# Patient Record
Sex: FEMALE
[De-identification: names, ages, dates, MRNs, and addresses within clinical notes are randomized per-mention and may not be internally consistent; named-entity substitution may affect disease eponyms.]

---

## 2017-02-27 VITALS — HEART RATE: 83 BPM

## 2017-04-28 ENCOUNTER — HOSPITAL ENCOUNTER (OUTPATIENT)
Dept: HOSPITAL 14 - H.OPSURG | Age: 62
Discharge: HOME | End: 2017-04-28
Attending: INTERNAL MEDICINE
Payer: MEDICAID

## 2017-04-28 VITALS — BODY MASS INDEX: 36 KG/M2

## 2017-04-28 VITALS — RESPIRATION RATE: 18 BRPM

## 2017-04-28 VITALS — DIASTOLIC BLOOD PRESSURE: 85 MMHG | SYSTOLIC BLOOD PRESSURE: 117 MMHG | HEART RATE: 92 BPM | TEMPERATURE: 98.3 F

## 2017-04-28 VITALS — OXYGEN SATURATION: 98 %

## 2017-04-28 DIAGNOSIS — R22.1: Primary | ICD-10-CM

## 2017-04-28 DIAGNOSIS — I48.91: ICD-10-CM

## 2017-04-28 DIAGNOSIS — C90.00: ICD-10-CM

## 2017-04-28 DIAGNOSIS — M54.9: ICD-10-CM

## 2017-04-28 LAB — APTT BLD: 32.5 SECONDS (ref 23.3–32.5)

## 2017-04-28 NOTE — PCM.SURG1
Surgeon's Initial Post Op Note





- Surgeon's Notes


Surgeon: Sofia


Assistant: None


Type of Anesthesia: IV Sedation, Local


Pre-Operative Diagnosis: Left neck mass


Operative Findings: Left neck mass


Post-Operative Diagnosis: Left neck mass


Operation Performed: Left neck mass biopsy


Specimen/Specimens Removed: 18G core samples x 2


Estimated Blood Loss: EBL {In ML}: 1


Date of Surgery/Procedure: 04/28/17


Time of Surgery/Procedure: 10:20

## 2017-04-28 NOTE — CP.SDSHP
Same Day Surgery [facility] P





- History


Proposed Procedure: Left neck mass biopsy


Pre-Op Diagnosis: Left neck mass, MM





- Allergies


Allergies: 


Allergies





No Known Allergies Allergy (Verified 04/28/17 08:09)


 











- Physical Exam


Vital Signs: 


 Vital Signs











  04/28/17 04/28/17 04/28/17





  08:52 10:22 10:35


 


Temperature 9.2 F L 98.5 F 97.0 F L


 


Pulse Rate 83 84 83


 


Respiratory 18 16 18





Rate   


 


Blood Pressure 119/81 141/98 H 140/95 H


 


O2 Sat by Pulse 96 100 98





Oximetry   














  04/28/17 04/28/17 04/28/17





  10:50 11:05 11:20


 


Temperature 97.6 F  


 


Pulse Rate 86 84 86


 


Respiratory 18 18 18





Rate   


 


Blood Pressure 130/80 137/80 130/90


 


O2 Sat by Pulse 98 99 99





Oximetry   














  04/28/17





  11:35


 


Temperature 


 


Pulse Rate 88


 


Respiratory 18





Rate 


 


Blood Pressure 134/86


 


O2 Sat by Pulse 99





Oximetry 














Short Stay Discharge





- Short Stay Discharge


Admitting Diagnosis/Reason for Visit: MULTIPLE MYELOMA


Disposition: HOME/ ROUTINE


Referrals: 


Tong Pickering MD [Primary Care Provider] -

## 2017-05-02 NOTE — CT
Left neck mass 



History: 61-year-old female with history of multiple myeloma 

presenting with left neck supraclavicular mass. 



Comparison: Comparison is made to prior CT scan of the chest from 

03/04/2016. 



Procedure and findings: 



Informed consent was obtained after discussion of relative risks and 

benefits with the patient.  The patient provided written informed 

consent. 



The left neck region was prepped and draped in the usual sterile 

techniques.  Direct ultrasound evaluation of the left neck was 

obtained. A heterogeneous relatively oval-shaped mass measuring 

approximately 1.8 centimeters is noted. No significant internal 

vascularity was identified.  Permanent images were stored. 



A percutaneous access site was chosen. 1 % lidocaine was used to 

anesthetize the skin and the subcutaneous soft tissue. A 17 gauge 

coaxial needle was introduced into the left neck mass.  The inner 

stylet was removed. 18 gauge spring-loaded biopsy system was 

introduced into the peripheral aspect of the mass. Two 18 gauge core 

biopsy samples were obtained. Biopsy samples were sent to pathology 

for analysis. 



Post biopsy ultrasound images did not demonstrate any hematoma. 



The patient tolerated the procedure well without any adverse events. 



Impression: 



Successful left neck mass biopsy.

## 2017-05-02 NOTE — US
Left neck mass 



History: 61-year-old female with history of multiple myeloma 

presenting with left neck supraclavicular mass. 



Comparison: Comparison is made to prior CT scan of the chest from 

03/04/2016. 



Procedure and findings: 



Informed consent was obtained after discussion of relative risks and 

benefits with the patient.  The patient provided written informed 

consent. 



The left neck region was prepped and draped in the usual sterile 

techniques.  Direct ultrasound evaluation of the left neck was 

obtained. A heterogeneous relatively oval-shaped mass measuring 

approximately 1.8 centimeters is noted. No significant internal 

vascularity was identified.  Permanent images were stored. 



A percutaneous access site was chosen. 1 % lidocaine was used to 

anesthetize the skin and the subcutaneous soft tissue. A 17 gauge 

coaxial needle was introduced into the left neck mass.  The inner 

stylet was removed. 18 gauge spring-loaded biopsy system was 

introduced into the peripheral aspect of the mass. Two 18 gauge core 

biopsy samples were obtained. Permanent images were stored. Biopsy 

samples were sent to pathology for analysis. 



Post biopsy ultrasound images did not demonstrate any hematoma. 



The patient tolerated the procedure well without any adverse events. 



Impression: 



Successful left neck mass biopsy.

## 2018-01-13 ENCOUNTER — HOSPITAL ENCOUNTER (INPATIENT)
Dept: HOSPITAL 14 - H.ER | Age: 63
LOS: 1 days | Discharge: HOME | DRG: 588 | End: 2018-01-14
Attending: INTERNAL MEDICINE | Admitting: INTERNAL MEDICINE
Payer: MEDICAID

## 2018-01-13 VITALS — BODY MASS INDEX: 39.4 KG/M2

## 2018-01-13 VITALS — RESPIRATION RATE: 18 BRPM

## 2018-01-13 DIAGNOSIS — Z79.01: ICD-10-CM

## 2018-01-13 DIAGNOSIS — C90.00: ICD-10-CM

## 2018-01-13 DIAGNOSIS — D89.9: ICD-10-CM

## 2018-01-13 DIAGNOSIS — I50.30: ICD-10-CM

## 2018-01-13 DIAGNOSIS — M10.9: ICD-10-CM

## 2018-01-13 DIAGNOSIS — Z83.3: ICD-10-CM

## 2018-01-13 DIAGNOSIS — Z82.5: ICD-10-CM

## 2018-01-13 DIAGNOSIS — Z82.49: ICD-10-CM

## 2018-01-13 DIAGNOSIS — I48.91: ICD-10-CM

## 2018-01-13 DIAGNOSIS — Z79.82: ICD-10-CM

## 2018-01-13 DIAGNOSIS — M54.9: ICD-10-CM

## 2018-01-13 DIAGNOSIS — Z79.899: ICD-10-CM

## 2018-01-13 DIAGNOSIS — I11.0: ICD-10-CM

## 2018-01-13 DIAGNOSIS — Z90.710: ICD-10-CM

## 2018-01-13 DIAGNOSIS — J20.9: Primary | ICD-10-CM

## 2018-01-13 DIAGNOSIS — Z92.21: ICD-10-CM

## 2018-01-13 DIAGNOSIS — G89.29: ICD-10-CM

## 2018-01-13 DIAGNOSIS — Z87.01: ICD-10-CM

## 2018-01-13 DIAGNOSIS — Z90.49: ICD-10-CM

## 2018-01-13 DIAGNOSIS — J44.0: ICD-10-CM

## 2018-01-13 DIAGNOSIS — L40.9: ICD-10-CM

## 2018-01-13 LAB
ALBUMIN SERPL-MCNC: 3.9 G/DL (ref 3.5–5)
ALBUMIN/GLOB SERPL: 1.3 {RATIO} (ref 1–2.1)
ALT SERPL-CCNC: 33 U/L (ref 9–52)
APTT BLD: 36.1 SECONDS (ref 25.6–37.1)
AST SERPL-CCNC: 28 U/L (ref 14–36)
BASOPHILS # BLD AUTO: 0 K/UL (ref 0–0.2)
BASOPHILS NFR BLD: 0.3 % (ref 0–2)
BILIRUB UR-MCNC: NEGATIVE MG/DL
BNP SERPL-MCNC: 663 PG/ML (ref 0–900)
BUN SERPL-MCNC: 10 MG/DL (ref 7–17)
CALCIUM SERPL-MCNC: 8.7 MG/DL (ref 8.4–10.2)
COLOR UR: YELLOW
EOSINOPHIL # BLD AUTO: 0.2 K/UL (ref 0–0.7)
EOSINOPHIL NFR BLD: 2.2 % (ref 0–4)
ERYTHROCYTE [DISTWIDTH] IN BLOOD BY AUTOMATED COUNT: 14.3 % (ref 11.5–14.5)
GFR NON-AFRICAN AMERICAN: > 60
GLUCOSE UR STRIP-MCNC: (no result) MG/DL
HGB BLD-MCNC: 15 G/DL (ref 12–16)
INR PPP: 1.5 (ref 0.9–1.2)
LEUKOCYTE ESTERASE UR-ACNC: (no result) LEU/UL
LYMPHOCYTES # BLD AUTO: 1 K/UL (ref 1–4.3)
LYMPHOCYTES NFR BLD AUTO: 11.6 % (ref 20–40)
MCH RBC QN AUTO: 30.3 PG (ref 27–31)
MCHC RBC AUTO-ENTMCNC: 33.8 G/DL (ref 33–37)
MCV RBC AUTO: 89.7 FL (ref 81–99)
MONOCYTES # BLD: 1.1 K/UL (ref 0–0.8)
MONOCYTES NFR BLD: 12.6 % (ref 0–10)
NEUTROPHILS # BLD: 6.1 K/UL (ref 1.8–7)
NEUTROPHILS NFR BLD AUTO: 73.3 % (ref 50–75)
NRBC BLD AUTO-RTO: 0 % (ref 0–0)
PH UR STRIP: 6 [PH] (ref 5–8)
PLATELET # BLD: 202 K/UL (ref 130–400)
PMV BLD AUTO: 9.2 FL (ref 7.2–11.7)
PROT UR STRIP-MCNC: NEGATIVE MG/DL
PROTHROMBIN TIME: 16.3 SECONDS (ref 9.8–13.1)
RBC # BLD AUTO: 4.95 MIL/UL (ref 3.8–5.2)
RBC # UR STRIP: NEGATIVE /UL
SP GR UR STRIP: 1.02 (ref 1–1.03)
SQUAMOUS EPITHIAL: < 1 /HPF (ref 0–5)
URINE CLARITY: CLEAR
URINE NITRATE: NEGATIVE
UROBILINOGEN UR-MCNC: (no result) MG/DL (ref 0.2–1)
WBC # BLD AUTO: 8.4 K/UL (ref 4.8–10.8)

## 2018-01-13 RX ADMIN — OXYCODONE HYDROCHLORIDE AND ACETAMINOPHEN PRN TAB: 5; 325 TABLET ORAL at 22:28

## 2018-01-13 NOTE — CP.PCM.HP
History of Present Illness





- History of Present Illness


History of Present Illness: 


CC: shortness of breath





HPI: 62 year old female with PMHx of A Fib, HTN, Multiple Myeloma on 

Chemotherapy, and CHF presents with a one day history of worsening dyspnea, 

associated with wheezing, ameliorated by home O2 patient has at home from 

another family member. In the ER she states she improved with nebulizer 

treatments, and that her mother had a history of emphysema. She has never smoked

, and was never around any family members who smoke. Patient is currently 

saturating 96% on 2 L nasal cannula. Chest XR has no active disease, patient is 

afebrile, and no leukocytosis. Patient to be observed overnight for possible 

worsening dyspnea. 





ROS: per HPI all other systems reviewed and negative 





PMH: Chronic Back Pain; Multiple Myeloma on Chemotherapy; A Fib on Digoxin and 

Xarelto as anticoagulant; CHF diastolic dysfunction with EF of 60% on ECHO of 2/ 2016; gout;    Mediastinal Mass; HTN; Fractured Ribs


PSH: Appendectomy; Hysterectomy; Cholecystectomy


SH: No Alcohol usage; No smoking; no illegal drug use; Live with the family


FH: Mother with DM II; HTN


Allergies: NKDA








Present on Admission





- Present on Admission


Any Indicators Present on Admission: No





Past Patient History





- Infectious Disease


Hx of Infectious Diseases: None





- Tetanus Immunizations


Tetanus Immunization: Unknown





- Past Medical History & Family History


Past Medical History?: Yes





- Past Social History


Smoking Status: Never Smoked





- CARDIAC


Hx Cardiac Disorders: Yes


Hx Cardia Arrhythmia: Yes


Hx Hypertension: Yes





- PULMONARY


Hx Respiratory Disorders: Yes


Hx Pneumonia: Yes


Other/Comment: MEDIASTINAL MASS





- NEUROLOGICAL


Hx Neurological Disorder: No





- HEENT


Hx HEENT Problems: No


Other/Comment: Glasses for reading





- RENAL


Hx Chronic Kidney Disease: No





- ENDOCRINE/METABOLIC


Hx Endocrine Disorders: No





- HEMATOLOGICAL/ONCOLOGICAL


Hx Blood Disorders: Yes


Hx AIDS: No


Hx Blood Transfusions: No


Hx Cancer: Yes (Multiple Myeloma)


Hx Chemotherapy: Yes (Multiple Myeloma on  revilimid)


Hx Human Immunodeficiency Virus (HIV): No


Other/Comment: Multiple Myeloma





- INTEGUMENTARY


Hx Dermatological Problems: No


Hx Psoriasis: Yes





- MUSCULOSKELETAL/RHEUMATOLOGICAL


Hx Musculoskeletal Disorders: Yes


Hx Back Pain: Yes


Hx Falls: No


Hx Fractures: Yes (rib)





- GASTROINTESTINAL


Hx Gastrointestinal Disorders: No





- GENITOURINARY/GYNECOLOGICAL


Hx Genitourinary Disorders: No


Other/Comment: Uterine fibroids Hx of Hysterectomy





- PSYCHIATRIC


Hx Psychophysiologic Disorder: No


Hx Substance Use: No





- SURGICAL HISTORY


Hx Surgeries: Yes


Hx Appendectomy: Yes


Hx Cholecystectomy: Yes


Hx Hysterectomy: Yes


Other/Comment: Hysterectomy





- ANESTHESIA


Hx Anesthesia: Yes


Hx Anesthesia Reactions: No


Hx Malignant Hyperthermia: No





Meds


Allergies/Adverse Reactions: 


 Allergies











Allergy/AdvReac Type Severity Reaction Status Date / Time


 


No Known Allergies Allergy   Verified 12/05/17 09:48














Physical Exam





- Constitutional


Appears: Non-toxic, No Acute Distress





- Head Exam


Head Exam: ATRAUMATIC, NORMOCEPHALIC





- Eye Exam


Eye Exam: EOMI, Normal appearance, PERRL


Pupil Exam: NORMAL ACCOMODATION





- ENT Exam


ENT Exam: Mucous Membranes Moist, Normal Oropharynx





- Respiratory Exam


Respiratory Exam: Wheezes, NORMAL BREATHING PATTERN





- Cardiovascular Exam


Cardiovascular Exam: RRR, +S1, +S2





- GI/Abdominal Exam


GI & Abdominal Exam: Normal Bowel Sounds, Soft.  absent: Organomegaly, 

Tenderness





- Extremities Exam


Extremities exam: Positive for: normal capillary refill, pedal pulses present





- Back Exam


Back exam: absent: CVA tenderness (L), CVA tenderness (R)





- Neurological Exam


Neurological exam: Alert, Oriented x3





- Psychiatric Exam


Psychiatric exam: Normal Affect, Normal Mood





Results





- Vital Signs


Recent Vital Signs: 





 Last Vital Signs











Temp  98.0 F   01/13/18 14:56


 


Pulse  74   01/13/18 14:56


 


Resp  19   01/13/18 15:05


 


BP  147/84   01/13/18 14:56


 


Pulse Ox  96   01/13/18 15:05














- Labs


Result Diagrams: 


 01/13/18 16:06





 01/13/18 16:06


Labs: 





 Laboratory Results - last 24 hr











  01/13/18 01/13/18 01/13/18





  16:03 16:06 16:06


 


WBC    8.4  D


 


RBC    4.95


 


Hgb    15.0


 


Hct    44.4


 


MCV    89.7


 


MCH    30.3


 


MCHC    33.8


 


RDW    14.3


 


Plt Count    202


 


MPV    9.2


 


Neut % (Auto)    73.3


 


Lymph % (Auto)    11.6 L


 


Mono % (Auto)    12.6 H


 


Eos % (Auto)    2.2


 


Baso % (Auto)    0.3


 


Neut #    6.1


 


Lymph #    1.0


 


Mono #    1.1 H


 


Eos #    0.2


 


Baso #    0.0


 


Sodium   140 


 


Potassium   3.4 L 


 


Chloride   101 


 


Carbon Dioxide   32 H 


 


Anion Gap   10 


 


BUN   10 


 


Creatinine   0.5 L 


 


Est GFR ( Amer)   > 60 


 


Est GFR (Non-Af Amer)   > 60 


 


Random Glucose   120 H 


 


Calcium   8.7 


 


Total Bilirubin   0.7 


 


AST   28 


 


ALT   33 


 


Alkaline Phosphatase   95 


 


Troponin I   < 0.0120 


 


NT-Pro-B Natriuret Pep   663 


 


Total Protein   6.9 


 


Albumin   3.9 


 


Globulin   3.0 


 


Albumin/Globulin Ratio   1.3 


 


Urine Color  Yellow  


 


Urine Clarity  Clear  


 


Urine pH  6.0  


 


Ur Specific Gravity  1.017  


 


Urine Protein  Negative  


 


Urine Glucose (UA)  Neg  


 


Urine Ketones  Negative  


 


Urine Blood  Negative  


 


Urine Nitrate  Negative  


 


Urine Bilirubin  Negative  


 


Urine Urobilinogen  0.2-1.0  


 


Ur Leukocyte Esterase  Neg  


 


Urine RBC (Auto)  3  


 


Urine Microscopic WBC  4  


 


Ur Squamous Epith Cells  < 1  














Assessment & Plan





- Assessment and Plan (Free Text)


Plan: 





62 year old female with PMHx of A Fib, HTN, Multiple Myeloma on Chemotherapy, 

and CHF presents with a one day history of worsening dyspnea, associated with 

mild cough and wheezing, ameliorated by home O2 patient has at home from 

another family member. In the ER she states she improved with nebulizer 

treatments, and that her mother had a history of emphysema. She has never smoked

, and was never around any family members who smoke. Patient is currently 

saturating 96% on 2 L nasal cannula. Chest XR has no active disease, patient is 

afebrile, and no leukocytosis. Patient to be observed overnight for possible 

worsening dyspnea. 








Dyspnea  2/2 Bronchitis?


   family hx emphysema


   afebrile, no leukocytosis, no cough or congestion on exam, + wheezing


   wheezing improving with bronchodilators


   procalcitonin pending for bacterial infection?


   cont bronchodilators and reassess in AM





AFib


HTN


   cont ASA, Coreg, Digoxin, Lisinopril, Xarelto





MM 


   on chemotherapy, follows with Dr. ASHER Otto


   cont dexamethasone





VTE ppx


   cont patient Xarelto

## 2018-01-13 NOTE — RAD
HISTORY:

SOB  



COMPARISON:

Chest radiograph dated 02/27/2017.



TECHNIQUE:

Chest PA and lateral



FINDINGS:



LUNGS:

No active pulmonary disease.



PLEURA:

No significant pleural effusion identified. No pneumothorax apparent.



CARDIOVASCULAR:

Cardiomediastinal silhouette stably enlarged.



OSSEOUS STRUCTURES:

Unchanged.



VISUALIZED UPPER ABDOMEN:

Normal.



OTHER FINDINGS:

None.



IMPRESSION:

No active disease.

## 2018-01-14 VITALS
DIASTOLIC BLOOD PRESSURE: 68 MMHG | OXYGEN SATURATION: 96 % | HEART RATE: 74 BPM | TEMPERATURE: 97.5 F | SYSTOLIC BLOOD PRESSURE: 96 MMHG

## 2018-01-14 VITALS — HEART RATE: 81 BPM

## 2018-01-14 PROCEDURE — 3E0F7GC INTRODUCTION OF OTHER THERAPEUTIC SUBSTANCE INTO RESPIRATORY TRACT, VIA NATURAL OR ARTIFICIAL OPENING: ICD-10-PCS | Performed by: INTERNAL MEDICINE

## 2018-01-14 RX ADMIN — OXYCODONE HYDROCHLORIDE AND ACETAMINOPHEN PRN TAB: 5; 325 TABLET ORAL at 13:15

## 2018-01-14 RX ADMIN — IPRATROPIUM BROMIDE AND ALBUTEROL SULFATE SCH ML: .5; 3 SOLUTION RESPIRATORY (INHALATION) at 11:26

## 2018-01-14 RX ADMIN — OXYCODONE HYDROCHLORIDE AND ACETAMINOPHEN PRN TAB: 5; 325 TABLET ORAL at 05:10

## 2018-01-14 RX ADMIN — IPRATROPIUM BROMIDE AND ALBUTEROL SULFATE SCH ML: .5; 3 SOLUTION RESPIRATORY (INHALATION) at 07:33

## 2018-01-14 RX ADMIN — IPRATROPIUM BROMIDE AND ALBUTEROL SULFATE SCH ML: .5; 3 SOLUTION RESPIRATORY (INHALATION) at 15:31

## 2018-01-14 NOTE — CT
PROCEDURE:  CT Chest without contrast



HISTORY:

r/o pneumonia



COMPARISON:

CT chest dated 02/19/2017.



TECHNIQUE:

Contiguous axial images were obtained through the chest without 

intravenous contrast enhancement. Sagittal and coronal 

reconstructions were performed.







Radiation dose (DLP): 648.1 mGy-cm. 



This CT exam was performed using one or more of the following dose 

reduction techniques: Automated exposure control, adjustment of the 

mA and/or kV according to patient size, and/or use of iterative 

reconstruction technique.



FINDINGS:



LUNGS:

Bibasilar atelectasis/scarring. Stable 5 millimeter left lower lobe 

nodule (series 3, image 61). Visualized airway clear. 



MEDIASTINUM:

Redemonstration of heterogeneous superior mediastinal/left lower neck 

mass measuring 6.7 by 4.9 centimeter causing similar tracheal 

deviation to the right without narrowing. Stable appearance of 

expansile lesion left posterior 1st rib contiguous with mediastinal 

mass. Unremarkable thoracic aorta. No aneurysm. Normal sized heart. 

Main pulmonary artery unremarkable. No vascular congestion. No 

lymphadenopathy. Small pericardial effusion.



PLEURA:

No pleural fluid. No pneumothorax.



BONES:

Stable osseous heterogeneity. Stable degenerative changes with 

multiple vertebral compression deformities. 



UPPER ABDOMEN:

Stable left hepatic lobe cyst.



OTHER FINDINGS:

None.



IMPRESSION:

Grossly stable appearance of left superior mediastinal/lower neck 

mass with associated involvement of the posterior 1st rib. 



Additional stable findings as above.

## 2018-01-14 NOTE — CP.PCM.DIS
Provider





- Provider


Date of Admission: 


01/13/18 17:41





Attending physician: 


Pat Jacques DO





Primary care physician: 





Jevon Pickering


Consults: 





none


Time Spent in preparation of Discharge (in minutes): 20





Hospital Course





- Lab Results


Lab Results: 


 Most Recent Lab Values











WBC  8.4 K/uL (4.8-10.8)  D 01/13/18  16:06    


 


RBC  4.95 Mil/uL (3.80-5.20)   01/13/18  16:06    


 


Hgb  15.0 g/dL (12.0-16.0)   01/13/18  16:06    


 


Hct  44.4 % (34.0-47.0)   01/13/18  16:06    


 


MCV  89.7 fl (81.0-99.0)   01/13/18  16:06    


 


MCH  30.3 pg (27.0-31.0)   01/13/18  16:06    


 


MCHC  33.8 g/dL (33.0-37.0)   01/13/18  16:06    


 


RDW  14.3 % (11.5-14.5)   01/13/18  16:06    


 


Plt Count  202 K/uL (130-400)   01/13/18  16:06    


 


MPV  9.2 fl (7.2-11.7)   01/13/18  16:06    


 


Neut % (Auto)  73.3 % (50.0-75.0)   01/13/18  16:06    


 


Lymph % (Auto)  11.6 % (20.0-40.0)  L  01/13/18  16:06    


 


Mono % (Auto)  12.6 % (0.0-10.0)  H  01/13/18  16:06    


 


Eos % (Auto)  2.2 % (0.0-4.0)   01/13/18  16:06    


 


Baso % (Auto)  0.3 % (0.0-2.0)   01/13/18  16:06    


 


Neut #  6.1 K/uL (1.8-7.0)   01/13/18  16:06    


 


Lymph #  1.0 K/uL (1.0-4.3)   01/13/18  16:06    


 


Mono #  1.1 K/uL (0.0-0.8)  H  01/13/18  16:06    


 


Eos #  0.2 K/uL (0.0-0.7)   01/13/18  16:06    


 


Baso #  0.0 K/uL (0.0-0.2)   01/13/18  16:06    


 


PT  16.3 Seconds (9.8-13.1)  H  01/13/18  20:01    


 


INR  1.5  (0.9-1.2)  H  01/13/18  20:01    


 


APTT  36.1 Seconds (25.6-37.1)   01/13/18  20:01    


 


Sodium  140 mmol/l (132-148)   01/13/18  16:06    


 


Potassium  3.4 MMOL/L (3.6-5.0)  L  01/13/18  16:06    


 


Chloride  101 mmol/L ()   01/13/18  16:06    


 


Carbon Dioxide  32 mmol/L (22-30)  H  01/13/18  16:06    


 


Anion Gap  10  (10-20)   01/13/18  16:06    


 


BUN  10 mg/dl (7-17)   01/13/18  16:06    


 


Creatinine  0.5 mg/dl (0.7-1.2)  L  01/13/18  16:06    


 


Est GFR ( Amer)  > 60   01/13/18  16:06    


 


Est GFR (Non-Af Amer)  > 60   01/13/18  16:06    


 


Random Glucose  120 mg/dL ()  H  01/13/18  16:06    


 


Calcium  8.7 mg/dL (8.4-10.2)   01/13/18  16:06    


 


Total Bilirubin  0.7 mg/dl (0.2-1.3)   01/13/18  16:06    


 


AST  28 U/L (14-36)   01/13/18  16:06    


 


ALT  33 U/L (9-52)   01/13/18  16:06    


 


Alkaline Phosphatase  95 U/L ()   01/13/18  16:06    


 


Troponin I  < 0.0120 ng/mL (0.00-0.120)   01/13/18  16:06    


 


NT-Pro-B Natriuret Pep  663 pg/ml (0-900)   01/13/18  16:06    


 


Total Protein  6.9 G/DL (6.3-8.2)   01/13/18  16:06    


 


Albumin  3.9 g/dL (3.5-5.0)   01/13/18  16:06    


 


Globulin  3.0 gm/dL (2.2-3.9)   01/13/18  16:06    


 


Albumin/Globulin Ratio  1.3  (1.0-2.1)   01/13/18  16:06    


 


Urine Color  Yellow  (YELLOW)   01/13/18  16:03    


 


Urine Clarity  Clear  (Clear)   01/13/18  16:03    


 


Urine pH  6.0  (5.0-8.0)   01/13/18  16:03    


 


Ur Specific Gravity  1.017  (1.003-1.030)   01/13/18  16:03    


 


Urine Protein  Negative mg/dL (NEGATIVE)   01/13/18  16:03    


 


Urine Glucose (UA)  Neg mg/dL (Normal)   01/13/18  16:03    


 


Urine Ketones  Negative mg/dL (NEGATIVE)   01/13/18  16:03    


 


Urine Blood  Negative  (NEGATIVE)   01/13/18  16:03    


 


Urine Nitrate  Negative  (NEGATIVE)   01/13/18  16:03    


 


Urine Bilirubin  Negative  (NEGATIVE)   01/13/18  16:03    


 


Urine Urobilinogen  0.2-1.0 mg/dL (0.2-1.0)   01/13/18  16:03    


 


Ur Leukocyte Esterase  Neg Ely/uL (Negative)   01/13/18  16:03    


 


Urine RBC (Auto)  3 /hpf (0-3)   01/13/18  16:03    


 


Urine Microscopic WBC  4 /hpf (0-5)   01/13/18  16:03    


 


Ur Squamous Epith Cells  < 1 /hpf (0-5)   01/13/18  16:03    














- Hospital Course


Hospital Course: 


62 year old female with PMHx of A Fib, HTN, Multiple Myeloma on Chemotherapy, 

and CHF presented with a one day history of worsening dyspnea, associated with 

mild cough and wheezing,upon going out in cold weather , ameliorated by home O2 

patient has at home from another family member. In the ER she states she 

improved with nebulizer treatments, and that her mother had a history of 

emphysema. She has never smoked, and was never around any family members who 

smoke.she states that was just started recently , 2 weeks ago on Zometa. She 

also admits to having cough and greenish sputum production for 5 days and was 

started on Mucinex  Po by her PMD. At present she denies any more sputum 

production .CXR showed no active disease but some increased interstitial 

markings ( as read by me). She was found to be afebrile with normal WBC, 

wheezing on exam . She was given Dounoebs in Er and Levaquine IV and placed 

under observation in telemetry for dyspnea and acute bronchitis.


Patient clinically showed improvement , at present with no dyspnea, scattered 

wheezing on exam, afebrile saturating 97-98 % on 2 L O2 via NC


patient feeling well and ready to go home


Will discharge patient home on Albuterol PRN,Levaquine and Medrol pack 


Patient to follow up with Dr. Pickering and her oncologist for continuation of her 

chemotherapy





1. Acute bronchitis/ URI 


Dyspnea  2/2 Bronchitis


Patient recently started on Zometa that can cause URi and dyspnea


afebrile, no leukocytosis, with cough and greenish sputum production for the 

last 4 days, + wheezing


wheezing improved with bronchodilators


Given solumedrol 125 mg IV, levaquine and Duonebs


Will continue same treatment upon discharge











2.AFib


rate controlled


continue digoxin and xeralto





3.HTN


controlled 


cont ASA, Coreg, Digoxin, Lisinopril, Xarelto





4.MM 


on chemotherapy, follows with Dr. ASHER Otto














Discharge Exam





- Head Exam


Head Exam: ATRAUMATIC, NORMOCEPHALIC





- Eye Exam


Eye Exam: EOMI, Normal appearance, PERRL


Pupil Exam: NORMAL ACCOMODATION





- ENT Exam


ENT Exam: Mucous Membranes Moist, Normal Exam





- Neck Exam


Neck exam: Full Rom, Normal Inspection





- Respiratory Exam


Respiratory Exam: Wheezes (scattered wheezing and rhonchi), NORMAL BREATHING 

PATTERN.  absent: Accessory Muscle Use, Prolonged Expiratory Phase, Respiratory 

Distress





- Cardiovascular Exam


Cardiovascular Exam: Irregular Rhythm, +S1, +S2.  absent: JVD





- GI/Abdominal Exam


GI & Abdominal Exam: Normal Bowel Sounds, Soft.  absent: Distended, Guarding, 

Rebound, Tenderness





- Rectal Exam


Rectal Exam: Deferred





- Extremities Exam


Extremities exam: normal capillary refill, normal inspection, pedal pulses 

present





- Back Exam


Back exam: NORMAL INSPECTION





- Neurological Exam


Neurological exam: Alert, CN II-XII Intact, Oriented x3, Reflexes Normal





- Psychiatric Exam


Psychiatric exam: Normal Affect, Normal Mood





- Skin


Skin Exam: Dry, Intact, Normal Color, Warm





Discharge Plan





- Discharge Medications


Prescriptions: 


Albuterol HFA [Ventolin HFA 90 mcg/actuation (8 g)] 1 puff IH Q4 #1 inhaler


levoFLOXacin [Levaquin] 750 mg PO DAILY #7 tab


Methylprednisolone [Medrol Dose Pack (21 tabs)] 4 mg PO DAILY #21 mg





- Follow Up Plan


Condition: STABLE


Disposition: HOME/ ROUTINE


Patient education suggested?: Yes


Instructions:  Bronchospasm (DC), How to Use a Nebulizer (DC), Acute Bronchitis 

(GEN)


Referrals: 


Tong Pickering MD [Staff Provider] -

## 2018-01-17 NOTE — CARD
--------------- APPROVED REPORT --------------





EKG Measurement

Heart [id number]

[id number]

[id number]

[id number]



<Conclusion>

Atrial fibrillation

ST & T wave abnormality, consider inferolateral ischemia

Abnormal ECG

## 2018-01-18 NOTE — ED PDOC
HPI: SOB/CHF/COPD


Time Seen by Provider: 01/13/18 15:05


Chief Complaint (Nursing): Shortness Of Breath


Chief Complaint (Provider): cough, SOB, respiratory distress


History Per: Patient, Family


History/Exam Limitations: no limitations


Current Symptoms Are (Timing): Still Present


Initiating Event: Upper Respiratory Illness


Quality: Tightness


Exacerbating Factor(s): Exertion, Coughing


Current Respiratory Medications: See Home Med List


Severity: Severe


Similar Symptoms Previously: +


Recently: Treated By A Physician


Additional Complaint(s): 





63yo female currently under care for multiple myeloma on cycle for chemo 

presents c/o cough, SOB, malaise and chest tightness. Denies hemoptysis, leg 

pain/edema or syncope.  Using medications at home without relief. 





Past Medical History


Reviewed: Historical Data, Nursing Documentation, Vital Signs


Vital Signs: 





 Last Vital Signs











Temp  97.5 F L  01/14/18 12:16


 


Pulse  74   01/14/18 12:16


 


Resp  18   01/14/18 12:16


 


BP  96/68 L  01/14/18 12:16


 


Pulse Ox  96   01/14/18 12:16














- Medical History


PMH: Atrial Fibrillation, Back Problems, Cardia Arrhythmia, CHF, Fractures (rib)

, HTN, Pneumonia


   Denies: HIV, Chronic Kidney Disease





- Surgical History


Surgical History: Appendectomy, Cholecystectomy





- Family History


Family History: States: Unknown Family Hx





- Living Arrangements


Living Arrangements: With Family





- Social History


Current smoker - smoking cessation education provided: No





- Home Medications


Home Medications: 


 Ambulatory Orders











 Medication  Instructions  Recorded


 


Aspirin [Aspirin EC] 81 mg PO DAILY #0 tablet. 06/20/16


 


Carvedilol [Coreg] 25 mg PO Q12 #0 tab 06/20/16


 


Digoxin 0.125 mg PO DAILY #0 tab 06/20/16


 


Famotidine [Pepcid] 20 mg PO DAILY #0 tab 06/20/16


 


Furosemide [Lasix] 40 mg PO DAILY #0 tab 06/20/16


 


Lisinopril [Zestril] 10 mg PO DAILY #0 tab 06/20/16


 


Calcium Carbonate [Oscal] 500 mg PO BID  tab 02/21/17


 


Rivaroxaban [Xarelto] 20 mg PO DAILY 02/28/17


 


Lenalidomide [Revlimid] 15 mg PO DAILY 05/23/17


 


Potassium Chloride [K-Dur 20 mEq 2 tab PO BID 08/15/17





ER Tab]  


 


Albuterol HFA [Ventolin HFA 90 1 puff IH Q4 #1 inhaler 01/14/18





mcg/actuation (8 g)]  


 


Guaifenesin [Mucinex] 600 mg PO BID #20 tab.er.12h 01/14/18


 


Methylprednisolone [Medrol Dose 4 mg PO DAILY #21 mg 01/14/18





Pack (21 tabs)]  


 


levoFLOXacin [Levaquin] 750 mg PO DAILY #7 tab 01/14/18














- Allergies


Allergies/Adverse Reactions: 


 Allergies











Allergy/AdvReac Type Severity Reaction Status Date / Time


 


No Known Allergies Allergy   Verified 12/05/17 09:48














Review of Systems


Constitutional: Positive for: Weakness, Malaise


ENT: Negative for: Mouth Swelling, Throat Swelling


Cardiovascular: Positive for: Chest Pain, Palpitations


Respiratory: Positive for: Cough, Shortness of Breath


Gastrointestinal: Negative for: Vomiting, Abdominal Pain


Musculoskeletal: Negative for: Neck Pain, Arm Pain, Leg Pain


Neurological: Positive for: Headache, Dizziness.  Negative for: Weakness, 

Numbness


Psych: Negative for: Suicidal ideation





Physical Exam





- Reviewed


Nursing Documentation Reviewed: Yes


Vital Signs Reviewed: Yes





- Physical Exam


Appears: Positive for: Non-toxic, Uncomfortable (mild resp distress)


Head Exam: Positive for: ATRAUMATIC, NORMAL INSPECTION, NORMOCEPHALIC


Skin: Positive for: Normal Color, Warm, DRY


Eye Exam: Positive for: EOMI, Normal appearance, PERRL


ENT: Positive for: Normal ENT Inspection


Neck: Positive for: Normal, Painless ROM


Cardiovascular/Chest: Positive for: Regular Rate, Rhythm


Respiratory: Positive for: Decreased Breath Sounds, Wheezing, Respiratory 

Distress


Pulses-Radial (L): 3+/4+


Pulses-Radial (R): 3+/4+


Gastrointestinal/Abdominal: Positive for: Bowel Sounds, Soft.  Negative for: 

Tenderness


Back: Positive for: Normal Inspection


Extremity: Positive for: Normal ROM


Neurologic/Psych: Positive for: Alert, Oriented.  Negative for: Motor/Sensory 

Deficits





- Laboratory Results


Result Diagrams: 


 01/13/18 16:06





 01/13/18 16:06





- ECG


O2 Sat by Pulse Oximetry: 96





Medical Decision Making


Medical Decision Making: 





workup for dyspnea initiated.  given history on chemo/myeloma consider 

infectious etiology as immunocompromised





labs, CXR and EKG were reviewed





Given bronchodilators and antibiotics were initiated empirically given clinical 

presentation and immunocompromised





Despite optimal treatment in ED, she remained w dyspnea, placed obs to 

hospitalist covering Dr Pickering














Disposition





- Clinical Impression


Clinical Impression: 


 Dyspnea, Respiratory tract infection








- Patient ED Disposition


Is Patient to be Admitted: Yes


Counseled Patient/Family Regarding: Studies Performed, Diagnosis





- Disposition


Disposition Time: 17:01


Condition: STABLE





- Pt Status Changed To:


Hospital Disposition Of: Observation

## 2018-04-02 ENCOUNTER — HOSPITAL ENCOUNTER (OUTPATIENT)
Dept: HOSPITAL 14 - H.ER | Age: 63
Setting detail: OBSERVATION
LOS: 2 days | Discharge: HOME | End: 2018-04-04
Attending: GENERAL ACUTE CARE HOSPITAL | Admitting: GENERAL ACUTE CARE HOSPITAL
Payer: MEDICAID

## 2018-04-02 VITALS — BODY MASS INDEX: 39.6 KG/M2

## 2018-04-02 DIAGNOSIS — I11.0: Primary | ICD-10-CM

## 2018-04-02 DIAGNOSIS — J98.11: ICD-10-CM

## 2018-04-02 DIAGNOSIS — I48.2: ICD-10-CM

## 2018-04-02 DIAGNOSIS — C90.00: ICD-10-CM

## 2018-04-02 DIAGNOSIS — Z90.710: ICD-10-CM

## 2018-04-02 DIAGNOSIS — Z90.49: ICD-10-CM

## 2018-04-02 DIAGNOSIS — J44.9: ICD-10-CM

## 2018-04-02 DIAGNOSIS — I50.33: ICD-10-CM

## 2018-04-02 LAB
ALBUMIN SERPL-MCNC: 3.7 G/DL (ref 3.5–5)
ALBUMIN/GLOB SERPL: 1.2 {RATIO} (ref 1–2.1)
ALT SERPL-CCNC: 64 U/L (ref 9–52)
APTT BLD: 44.7 SECONDS (ref 25.6–37.1)
AST SERPL-CCNC: 41 U/L (ref 14–36)
BASOPHILS # BLD AUTO: 0 K/UL (ref 0–0.2)
BASOPHILS NFR BLD: 0.7 % (ref 0–2)
BILIRUB UR-MCNC: NEGATIVE MG/DL
BNP SERPL-MCNC: 1120 PG/ML (ref 0–900)
BUN SERPL-MCNC: 4 MG/DL (ref 7–17)
CALCIUM SERPL-MCNC: 9 MG/DL (ref 8.4–10.2)
COLOR UR: (no result)
EOSINOPHIL # BLD AUTO: 0.1 K/UL (ref 0–0.7)
EOSINOPHIL NFR BLD: 1.6 % (ref 0–4)
ERYTHROCYTE [DISTWIDTH] IN BLOOD BY AUTOMATED COUNT: 16.4 % (ref 11.5–14.5)
GFR NON-AFRICAN AMERICAN: > 60
GLUCOSE UR STRIP-MCNC: (no result) MG/DL
HGB BLD-MCNC: 12.7 G/DL (ref 12–16)
INR PPP: 1.7 (ref 0.9–1.2)
LEUKOCYTE ESTERASE UR-ACNC: (no result) LEU/UL
LYMPHOCYTES # BLD AUTO: 0.8 K/UL (ref 1–4.3)
LYMPHOCYTES NFR BLD AUTO: 15.4 % (ref 20–40)
MCH RBC QN AUTO: 30.3 PG (ref 27–31)
MCHC RBC AUTO-ENTMCNC: 33.7 G/DL (ref 33–37)
MCV RBC AUTO: 89.7 FL (ref 81–99)
MONOCYTES # BLD: 0.6 K/UL (ref 0–0.8)
MONOCYTES NFR BLD: 12.6 % (ref 0–10)
NEUTROPHILS # BLD: 3.6 K/UL (ref 1.8–7)
NEUTROPHILS NFR BLD AUTO: 69.7 % (ref 50–75)
NRBC BLD AUTO-RTO: 0.3 % (ref 0–0)
PH UR STRIP: 7 [PH] (ref 5–8)
PLATELET # BLD: 263 K/UL (ref 130–400)
PMV BLD AUTO: 8.5 FL (ref 7.2–11.7)
PROT UR STRIP-MCNC: NEGATIVE MG/DL
PROTHROMBIN TIME: 18.9 SECONDS (ref 9.8–13.1)
RBC # BLD AUTO: 4.18 MIL/UL (ref 3.8–5.2)
RBC # UR STRIP: NEGATIVE /UL
SP GR UR STRIP: < 1.005 (ref 1–1.03)
URINE CLARITY: CLEAR
UROBILINOGEN UR-MCNC: (no result) MG/DL (ref 0.2–1)
WBC # BLD AUTO: 5.1 K/UL (ref 4.8–10.8)

## 2018-04-02 PROCEDURE — 71045 X-RAY EXAM CHEST 1 VIEW: CPT

## 2018-04-02 PROCEDURE — 93306 TTE W/DOPPLER COMPLETE: CPT

## 2018-04-02 PROCEDURE — 80048 BASIC METABOLIC PNL TOTAL CA: CPT

## 2018-04-02 PROCEDURE — 83880 ASSAY OF NATRIURETIC PEPTIDE: CPT

## 2018-04-02 PROCEDURE — 81003 URINALYSIS AUTO W/O SCOPE: CPT

## 2018-04-02 PROCEDURE — 85025 COMPLETE CBC W/AUTO DIFF WBC: CPT

## 2018-04-02 PROCEDURE — 80053 COMPREHEN METABOLIC PANEL: CPT

## 2018-04-02 PROCEDURE — 36415 COLL VENOUS BLD VENIPUNCTURE: CPT

## 2018-04-02 PROCEDURE — 93005 ELECTROCARDIOGRAM TRACING: CPT

## 2018-04-02 PROCEDURE — 99285 EMERGENCY DEPT VISIT HI MDM: CPT

## 2018-04-02 PROCEDURE — 85610 PROTHROMBIN TIME: CPT

## 2018-04-02 PROCEDURE — 85730 THROMBOPLASTIN TIME PARTIAL: CPT

## 2018-04-02 PROCEDURE — 96374 THER/PROPH/DIAG INJ IV PUSH: CPT

## 2018-04-02 PROCEDURE — 84484 ASSAY OF TROPONIN QUANT: CPT

## 2018-04-02 RX ADMIN — OXYCODONE HYDROCHLORIDE AND ACETAMINOPHEN PRN TAB: 5; 325 TABLET ORAL at 21:11

## 2018-04-02 RX ADMIN — POTASSIUM CHLORIDE SCH MEQ: 20 TABLET, EXTENDED RELEASE ORAL at 18:07

## 2018-04-02 NOTE — RAD
HISTORY:

SOB  



COMPARISON:

Chest radiographs 01/13/2018. 



FINDINGS:



LUNGS:

Inspiratory volume appears diminished bilaterally. Linear atelectasis 

or fibrosis seen at the inferior left lung zone laterally. No 

infiltrate bilaterally.



PLEURA:

No significant pleural effusion identified, no pneumothorax apparent.



CARDIOVASCULAR:

Prominent cardiac silhouette appears stable. Borderline pulmonary 

venous congestion.



OSSEOUS STRUCTURES:

Multiple old healed mid to inferior right rib fractures again evident.



VISUALIZED UPPER ABDOMEN:

Normal.



OTHER FINDINGS:

None.



IMPRESSION:

Borderline CHF pattern.  Linear atelectasis or fibrosis in the left 

base laterally. No acute infiltrate or pleural effusion identified 

bilaterally.

## 2018-04-02 NOTE — ED PDOC
HPI: SOB/CHF/COPD


Time Seen by Provider: 04/02/18 12:25


Chief Complaint (Nursing): Shortness Of Breath


Chief Complaint (Provider): difficulty breathing


History Per: Patient, 


History/Exam Limitations: no limitations


Onset/Duration Of Symptoms: Days (2)


Current Symptoms Are (Timing): Still Present


Quality: Tightness


Exacerbating Factor(s): Exertion, Laying Flat, Coughing


Current Respiratory Medications: See Home Med List


Severity: Moderate


Associated Symptoms: Heart Racing, Ankle/Leg Swelling, Dizziness


Similar Symptoms Previously: ++


Additional Complaint(s): 





63yo female c/o dyspnea, orthopnea and cough ongoing and worsening for several 

days. Denies fever, body aches or syncope. States compliance w medications, 

admits to chinese food intake which may have precipitated symptoms. 


Prior charts reviewed revealing under treatment for multiple myeloma, also w 

Afib, HTN, CHF and had episode URI/bronchoconstriction this winter requiring 

hospitalization.





Past Medical History


Reviewed: Historical Data, Nursing Documentation, Vital Signs


Vital Signs: 


 Last Vital Signs











Temp  98.6 F   04/04/18 11:48


 


Pulse  81   04/04/18 11:48


 


Resp  18   04/04/18 11:48


 


BP  121/77   04/04/18 11:48


 


Pulse Ox  96   04/04/18 11:48














- Medical History


PMH: Atrial Fibrillation, Back Problems, Cardia Arrhythmia, CHF, Fractures (rib)

, HTN, Pneumonia


   Denies: HIV, Chronic Kidney Disease





- Surgical History


Surgical History: Appendectomy, Cholecystectomy





- Family History


Family History: States: Unknown Family Hx





- Living Arrangements


Living Arrangements: With Family





- Social History


Current smoker - smoking cessation education provided: No


Drugs: Denies





- Home Medications


Home Medications: 


 Ambulatory Orders











 Medication  Instructions  Recorded


 


Rivaroxaban [Xarelto] 20 mg PO DAILY 02/28/17


 


Lenalidomide [Revlimid] 15 mg PO DAILY 05/23/17


 


Potassium Chloride [K-Dur 20 mEq 20 meq PO BID 08/15/17





ER Tab]  


 


Dexamethasone [Decadron] 10 mg PO BID 03/05/18


 


Albuterol HFA [Ventolin HFA 90 2 puff IH Q4 PRN 04/02/18





mcg/actuation (8 g)]  


 


Allopurinol [Zyloprim] 100 mg PO DAILY 04/02/18


 


Aspirin [Ecotrin] 81 mg PO DAILY 04/02/18


 


Carvedilol [Coreg] 25 mg PO Q12 04/02/18


 


Digoxin [Digitek] 125 mcg PO DAILY 04/02/18


 


Famotidine [Pepcid] 20 mg PO DAILY 04/02/18


 


Lisinopril [Zestril] 10 mg PO DAILY 04/02/18


 


oxyCODONE/Acetaminophen [Percocet 1 tab PO Q6 PRN 04/02/18





5/325 mg Tab]  


 


Furosemide [Lasix] 20 mg PO BID #60 04/04/18


 


guaiFENesin/Dextromethorphan 10 ml PO Q4 PRN #200 ml 04/04/18





[Robitussin DM]  














- Allergies


Allergies/Adverse Reactions: 


 Allergies











Allergy/AdvReac Type Severity Reaction Status Date / Time


 


No Known Allergies Allergy   Verified 12/05/17 09:48














Review of Systems


Constitutional: Positive for: Weakness.  Negative for: Fever, Chills


Cardiovascular: Positive for: Chest Pain, Palpitations, Paroxysmal Noc. Dyspnea

, Edema, Light Headedness


Respiratory: Positive for: Cough, Shortness of Breath, SOB with Exertion.  

Negative for: Hemoptysis


Gastrointestinal: Negative for: Nausea, Abdominal Pain


Genitourinary Female: Negative for: Dysuria


Musculoskeletal: Negative for: Neck Pain, Leg Pain


Skin: Negative for: Rash, Jaundice


Neurological: Positive for: Dizziness.  Negative for: Weakness, Numbness, 

Headache


Psych: Negative for: Depression





Physical Exam





- Reviewed


Nursing Documentation Reviewed: Yes


Vital Signs Reviewed: Yes





- Physical Exam


Appears: Positive for: Well, Non-toxic, No Acute Distress


Head Exam: Positive for: ATRAUMATIC, NORMAL INSPECTION, NORMOCEPHALIC


Skin: Positive for: Normal Color, Warm, DRY


Eye Exam: Positive for: EOMI, Normal appearance, PERRL


ENT: Positive for: Normal ENT Inspection


Neck: Positive for: Normal, Painless ROM


Cardiovascular/Chest: Positive for: Regular Rate, Rhythm


Respiratory: Positive for: Decreased Breath Sounds, Wheezing.  Negative for: 

Respiratory Distress


Gastrointestinal/Abdominal: Positive for: Soft.  Negative for: Tenderness


Back: Positive for: Normal Inspection


Extremity: Positive for: Pedal Edema, Swelling (b/l LE)


Neurologic/Psych: Positive for: Alert, Oriented.  Negative for: Motor/Sensory 

Deficits





- Laboratory Results


Result Diagrams: 


 04/02/18 13:35





 04/04/18 04:20





- ECG


ECG: Positive for: Interpreted By Me


ECG Rhythm: Positive for: Atrial Fibrillation, Nonspecific Changes


Rate: 79


O2 Sat by Pulse Oximetry: 99


Pulse Ox Interpretation: Normal





Medical Decision Making


Medical Decision Making: 





workup for dyspnea initiated


Duoneb ordered given trace wheeze at bases








Time: 13:06


Portable Chest X-Ray


FINDINGS:


LUNGS:


Inspiratory volume appears diminished bilaterally. Linear atelectasis or 

fibrosis seen at the inferior left lung zone laterally. No infiltrate 

bilaterally.





PLEURA:


No significant pleural effusion identified, no pneumothorax apparent.





CARDIOVASCULAR:


Prominent cardiac silhouette appears stable. Borderline pulmonary venous 

congestion.





OSSEOUS STRUCTURES:


Multiple old healed mid to inferior right rib fractures again evident.





VISUALIZED UPPER ABDOMEN:


Normal.





OTHER FINDINGS:


None.





IMPRESSION:


Borderline CHF pattern.  Linear atelectasis or fibrosis in the left base 

laterally. No acute infiltrate or pleural effusion identified bilaterally.











14:55


Discussed case with Dr. Lockett











--------------------------------------------------------------------------------

-----------------


Scribe Attestation:


Documented by Hermann Chan, acting as a scribe for Joey Carreon III DO








Disposition





- Clinical Impression


Clinical Impression: 


 CHF exacerbation








- Patient ED Disposition


Is Patient to be Admitted: Yes





- Disposition


Disposition Time: 14:30 (admit hospitalist)


Condition: GOOD





- Pt Status Changed To:


Hospital Disposition Of: Observation





- POA


Present On Arrival: None

## 2018-04-02 NOTE — CP.PCM.HP
History of Present Illness





- History of Present Illness


History of Present Illness: 





63 yo female with history of AFib, HTN, MMyeloma and CHF came in complaining of 

sudden onset of SOB upon waking up this morning. Accompanying symptoms were 

orthopnea with 4 pillows and on going cough productive with yellow sputum for a 

few days. Denied chest pain, fever, chills or abdominal pain. 

















Present on Admission





- Present on Admission


Any Indicators Present on Admission: No


History of DVT/PE: No


History of Uncontrolled Diabetes: No


Urinary Catheter: No


Decubitus Ulcer Present: No





Review of Systems





- Review of Systems


All systems: reviewed and no additional remarkable complaints except (aside 

from those mentioned above, 12 point system review were negative by me)





Past Patient History





- Infectious Disease


Hx of Infectious Diseases: None





- Tetanus Immunizations


Tetanus Immunization: Unknown





- Past Medical History & Family History


Past Medical History?: Yes





- Past Social History


Smoking Status: Never Smoked


Alcohol: Occasional


Drugs: Denies


Home Situation {Lives}: With Family





- CARDIAC


Hx Atrial Fibrillation: Yes


Hx Cardia Arrhythmia: Yes


Hx Congestive Heart Failure: Yes


Hx Hypertension: Yes





- PULMONARY


Hx Pneumonia: Yes





- NEUROLOGICAL


Hx Neurological Disorder: No





- HEENT


Hx HEENT Problems: No


Other/Comment: Glasses for reading





- RENAL


Hx Chronic Kidney Disease: No





- ENDOCRINE/METABOLIC


Hx Endocrine Disorders: No





- HEMATOLOGICAL/ONCOLOGICAL


Hx Chemotherapy: Yes


Hx Human Immunodeficiency Virus (HIV): No


Other/Comment: Multiple Myeloma





- INTEGUMENTARY


Hx Dermatological Problems: Yes


Hx Psoriasis: Yes





- MUSCULOSKELETAL/RHEUMATOLOGICAL


Hx Fractures: Yes (rib)


Hx Gout: Yes





- GASTROINTESTINAL


Hx Gastrointestinal Disorders: No





- GENITOURINARY/GYNECOLOGICAL


Hx Genitourinary Disorders: No


Other/Comment: Uterine Fibroids





- PSYCHIATRIC


Hx Psychophysiologic Disorder: No


Hx Substance Use: No





- SURGICAL HISTORY


Hx Appendectomy: Yes


Hx Cholecystectomy: Yes


Hx Hysterectomy: Yes (because of Fibroid Uteri)





- ANESTHESIA


Hx Anesthesia: Yes


Hx Anesthesia Reactions: No


Hx Malignant Hyperthermia: No





Meds


Allergies/Adverse Reactions: 


 Allergies











Allergy/AdvReac Type Severity Reaction Status Date / Time


 


No Known Allergies Allergy   Verified 12/05/17 09:48














Physical Exam





- Constitutional


Appears: No Acute Distress





- Head Exam


Head Exam: ATRAUMATIC





- Eye Exam


Eye Exam: absent: Scleral icterus





- ENT Exam


ENT Exam: Mucous Membranes Moist





- Neck Exam


Neck exam: Negative for: Meningismus





- Respiratory Exam


Respiratory Exam: Decreased Breath Sounds.  absent: Rales, Rhonchi, Wheezes, 

Respiratory Distress





- Cardiovascular Exam


Cardiovascular Exam: REGULAR RHYTHM, +S1, +S2





- GI/Abdominal Exam


GI & Abdominal Exam: Soft.  absent: Tenderness





- Rectal Exam


Rectal Exam: Deferred





- Extremities Exam


Extremities exam: Negative for: pedal edema





- Back Exam


Back exam: NORMAL INSPECTION





- Neurological Exam


Neurological exam: Alert, Oriented x3





- Psychiatric Exam


Psychiatric exam: Normal Affect





- Skin


Skin Exam: Dry, Intact





Results





- Vital Signs


Recent Vital Signs: 





 Last Vital Signs











Temp  98 F   04/02/18 15:03


 


Pulse  72   04/02/18 15:03


 


Resp  18   04/02/18 15:03


 


BP  122/71   04/02/18 15:03


 


Pulse Ox  98   04/02/18 15:03














- Labs


Result Diagrams: 


 04/02/18 13:35





 04/02/18 13:35


Labs: 





 Laboratory Results - last 24 hr











  04/02/18 04/02/18 04/02/18





  13:35 13:35 13:35


 


WBC   5.1 


 


RBC   4.18 


 


Hgb   12.7 


 


Hct   37.5 


 


MCV   89.7 


 


MCH   30.3 


 


MCHC   33.7 


 


RDW   16.4 H 


 


Plt Count   263 


 


MPV   8.5 


 


Neut % (Auto)   69.7 


 


Lymph % (Auto)   15.4 L 


 


Mono % (Auto)   12.6 H 


 


Eos % (Auto)   1.6 


 


Baso % (Auto)   0.7 


 


Neut # (Auto)   3.6 


 


Lymph # (Auto)   0.8 L 


 


Mono # (Auto)   0.6 


 


Eos # (Auto)   0.1 


 


Baso # (Auto)   0.0 


 


PT    18.9 H


 


INR    1.7 H


 


APTT    44.7 H


 


Sodium  142  


 


Potassium  3.0 L  


 


Chloride  103  


 


Carbon Dioxide  27  


 


Anion Gap  15  


 


BUN  4 L  


 


Creatinine  0.4 L  


 


Est GFR ( Amer)  > 60  


 


Est GFR (Non-Af Amer)  > 60  


 


Random Glucose  102  


 


Calcium  9.0  


 


Total Bilirubin  1.2  


 


AST  41 H D  


 


ALT  64 H D  


 


Alkaline Phosphatase  95  


 


Troponin I  < 0.0120  


 


NT-Pro-B Natriuret Pep  1120 H  


 


Total Protein  6.8  


 


Albumin  3.7  


 


Globulin  3.1  


 


Albumin/Globulin Ratio  1.2  


 


Urine Color   


 


Urine Clarity   


 


Urine pH   


 


Ur Specific Gravity   


 


Urine Protein   


 


Urine Glucose (UA)   


 


Urine Ketones   


 


Urine Blood   


 


Urine Nitrate   


 


Urine Bilirubin   


 


Urine Urobilinogen   


 


Ur Leukocyte Esterase   


 


Urine RBC (Auto)   


 


Urine Microscopic WBC   














  04/02/18





  13:35


 


WBC 


 


RBC 


 


Hgb 


 


Hct 


 


MCV 


 


MCH 


 


MCHC 


 


RDW 


 


Plt Count 


 


MPV 


 


Neut % (Auto) 


 


Lymph % (Auto) 


 


Mono % (Auto) 


 


Eos % (Auto) 


 


Baso % (Auto) 


 


Neut # (Auto) 


 


Lymph # (Auto) 


 


Mono # (Auto) 


 


Eos # (Auto) 


 


Baso # (Auto) 


 


PT 


 


INR 


 


APTT 


 


Sodium 


 


Potassium 


 


Chloride 


 


Carbon Dioxide 


 


Anion Gap 


 


BUN 


 


Creatinine 


 


Est GFR ( Amer) 


 


Est GFR (Non-Af Amer) 


 


Random Glucose 


 


Calcium 


 


Total Bilirubin 


 


AST 


 


ALT 


 


Alkaline Phosphatase 


 


Troponin I 


 


NT-Pro-B Natriuret Pep 


 


Total Protein 


 


Albumin 


 


Globulin 


 


Albumin/Globulin Ratio 


 


Urine Color  Straw


 


Urine Clarity  Clear


 


Urine pH  7.0


 


Ur Specific Gravity  < 1.005


 


Urine Protein  Negative


 


Urine Glucose (UA)  Neg


 


Urine Ketones  Negative


 


Urine Blood  Negative


 


Urine Nitrate  Negative


 


Urine Bilirubin  Negative


 


Urine Urobilinogen  0.2-1.0


 


Ur Leukocyte Esterase  Neg


 


Urine RBC (Auto)  2


 


Urine Microscopic WBC  < 1














Assessment & Plan





- Assessment and Plan (Free Text)


Assessment: 





63 yo female with history of AFib, HTN, MMyeloma and CHF came in complaining of 

sudden onset of SOB upon waking up this morning. Accompanying symptoms were 

orthopnea with 4 pillows and on going cough productive with yellow sputum for a 

few days. Denied chest pain, fever, chills or abdominal pain. 








1. CHF exacerbation


Lasix 40mg IV


continue Lisinopril


serial Troponins








2. AFib


rate controlled


continue Digoxin and Carvedilol


continue Xarelto








3. HTN


BP stable


continue Lisinopril








4. Multiple Myeloma


continue Revlimid 15mg PO daily

## 2018-04-03 LAB
BUN SERPL-MCNC: 10 MG/DL (ref 7–17)
CALCIUM SERPL-MCNC: 9.1 MG/DL (ref 8.4–10.2)
GFR NON-AFRICAN AMERICAN: > 60

## 2018-04-03 RX ADMIN — DIGOXIN SCH MG: 0.12 TABLET ORAL at 09:08

## 2018-04-03 RX ADMIN — POTASSIUM CHLORIDE SCH MEQ: 20 TABLET, EXTENDED RELEASE ORAL at 09:09

## 2018-04-03 RX ADMIN — POTASSIUM CHLORIDE SCH MEQ: 20 TABLET, EXTENDED RELEASE ORAL at 16:11

## 2018-04-03 RX ADMIN — GUAIFENESIN AND DEXTROMETHORPHAN PRN ML: 100; 10 SYRUP ORAL at 17:08

## 2018-04-03 RX ADMIN — GUAIFENESIN AND DEXTROMETHORPHAN PRN ML: 100; 10 SYRUP ORAL at 22:48

## 2018-04-03 RX ADMIN — OXYCODONE HYDROCHLORIDE AND ACETAMINOPHEN PRN TAB: 5; 325 TABLET ORAL at 20:40

## 2018-04-03 NOTE — CP.PCM.CON
History of Present Illness





- History of Present Illness


History of Present Illness: 





This is a 62 yrs old female who was admitted for CHF.  Pt was first diagnosed 

to have a myeloma when she presented in 2014 with a right upper lobe mass. This 

was biopsied and turned out to be a myeloma. She was started on chemotherapy 

and very well. the mass was almost completely gone, and her immunoglobulins 

came to normal as well. She was started on Revlimid and and decadron as 

maintenance. Recently she started having a lot of pain in the left ribs , but 

refused RT. She was restarted on chemotherapy but new lesions were seen on the 

ribs and scapular area,. She started aving a fair amount of pain and finally 

agreed to get rt to the mass in the left upper lobe as well as the ribs, and 

she is now on chemotherapy again. Pt however has a h/o chronic congestive heart 

failure and 


atrial fib and when she came for the chemotherapy yesterday she was very short 

of breath and could not walk a few feet without getting SOB. She was sent to 

the ER from where she was admitted. 


She was given lasix and she is much better now. Will do her chemo on Monday April 16





Past Patient History





- Infectious Disease


Hx of Infectious Diseases: None





- Tetanus Immunizations


Tetanus Immunization: Unknown





- Past Medical History & Family History


Past Medical History?: Yes





- Past Social History


Smoking Status: Never Smoked





- CARDIAC


Hx Cardiac Disorders: Yes


Hx Atrial Fibrillation: Yes


Hx Cardia Arrhythmia: Yes


Hx Congestive Heart Failure: Yes


Hx Hypertension: Yes





- PULMONARY


Hx Respiratory Disorders: Yes


Hx Pneumonia: Yes





- NEUROLOGICAL


Hx Neurological Disorder: No





- HEENT


Hx HEENT Problems: No





- RENAL


Hx Chronic Kidney Disease: No





- ENDOCRINE/METABOLIC


Hx Endocrine Disorders: No





- HEMATOLOGICAL/ONCOLOGICAL


Hx Blood Disorders: Yes


Hx Chemotherapy: Yes


Hx Human Immunodeficiency Virus (HIV): No


Other/Comment: Multiple Myeloma





- INTEGUMENTARY


Hx Dermatological Problems: No





- MUSCULOSKELETAL/RHEUMATOLOGICAL


Hx Musculoskeletal Disorders: No


Hx Falls: No





- GASTROINTESTINAL


Hx Gastrointestinal Disorders: No





- GENITOURINARY/GYNECOLOGICAL


Hx Genitourinary Disorders: No





- PSYCHIATRIC


Hx Psychophysiologic Disorder: No


Hx Substance Use: No





- SURGICAL HISTORY


Hx Surgeries: Yes


Hx Appendectomy: Yes


Hx Hysterectomy: Yes (due to fibroids)





- ANESTHESIA


Hx Anesthesia: Yes


Hx Anesthesia Reactions: No


Hx Malignant Hyperthermia: No


Has any member of the family had a problem w/ anesthesia?: No





Meds


Allergies/Adverse Reactions: 


 Allergies











Allergy/AdvReac Type Severity Reaction Status Date / Time


 


No Known Allergies Allergy   Verified 12/05/17 09:48














- Medications


Medications: 


 Current Medications





Albuterol (Ventolin Hfa 90 Mcg/Actuation (8 G))  2 puff IH Q4 PRN


   PRN Reason: Shortness of Breath


Allopurinol (Zyloprim)  100 mg PO DAILY [facility]


   Last Admin: 04/03/18 09:11 Dose:  100 mg


Aspirin (Ecotrin)  81 mg PO DAILY [facility]


   Last Admin: 04/03/18 09:09 Dose:  81 mg


Carvedilol (Coreg)  25 mg PO Q12 [facility]


   Last Admin: 04/03/18 09:08 Dose:  25 mg


Dexamethasone (Decadron)  10 mg PO BID [facility]


   Last Admin: 04/03/18 09:08 Dose:  10 mg


Digoxin (Digoxin)  0.125 mg PO DAILY [facility]


   Last Admin: 04/03/18 09:08 Dose:  0.125 mg


Famotidine (Pepcid)  20 mg PO DAILY [facility]


   Last Admin: 04/03/18 09:09 Dose:  20 mg


Furosemide (Lasix)  40 mg IV DAILY [facility]


   Last Admin: 04/03/18 09:09 Dose:  40 mg


Home Med (Lenalidomide [Revlimid])  15 mg PO DAILY [facility]


   Last Admin: 04/03/18 09:11 Dose:  15 mg


Lisinopril (Zestril)  10 mg PO DAILY [facility]


   Last Admin: 04/03/18 09:10 Dose:  10 mg


Oxycodone/Acetaminophen (Percocet 5/325 Mg Tab)  1 tab PO Q6 PRN


   PRN Reason: Pain, severe (8-10)


   Stop: 04/05/18 16:15


   Last Admin: 04/02/18 21:11 Dose:  1 tab


Potassium Chloride (K-Dur 20 Meq Er Tab)  20 meq PO BID [facility]


   Last Admin: 04/03/18 09:09 Dose:  20 meq


Rivaroxaban (Xarelto)  20 mg PO DAILY [facility]


   PRN Reason: Protocol


   Last Admin: 04/03/18 09:10 Dose:  20 mg











Physical Exam





- Additional Findings


Additional findings: 





Physical exam; Alert,well oriented, in no acute distress


neck; supple,no adenopathy, except some fullness in the neck area from a benign 

tumor.


Cheat; Bibasilar rales.


Heart; RSR, no murmur


Abd; soft, no mass, no h/s megaly





Results





- Vital Signs


Recent Vital Signs: 


 Last Vital Signs











Temp  98.4 F   04/03/18 07:58


 


Pulse  74   04/03/18 09:10


 


Resp  18   04/03/18 07:58


 


BP  127/80   04/03/18 09:10


 


Pulse Ox  97   04/03/18 07:58














- Labs


Result Diagrams: 


 04/02/18 13:35





 04/02/18 13:35


Labs: 


 Laboratory Results - last 24 hr











  04/02/18 04/02/18 04/02/18





  13:35 13:35 13:35


 


WBC   5.1 


 


RBC   4.18 


 


Hgb   12.7 


 


Hct   37.5 


 


MCV   89.7 


 


MCH   30.3 


 


MCHC   33.7 


 


RDW   16.4 H 


 


Plt Count   263 


 


MPV   8.5 


 


Neut % (Auto)   69.7 


 


Lymph % (Auto)   15.4 L 


 


Mono % (Auto)   12.6 H 


 


Eos % (Auto)   1.6 


 


Baso % (Auto)   0.7 


 


Neut # (Auto)   3.6 


 


Lymph # (Auto)   0.8 L 


 


Mono # (Auto)   0.6 


 


Eos # (Auto)   0.1 


 


Baso # (Auto)   0.0 


 


PT    18.9 H


 


INR    1.7 H


 


APTT    44.7 H


 


Sodium  142  


 


Potassium  3.0 L  


 


Chloride  103  


 


Carbon Dioxide  27  


 


Anion Gap  15  


 


BUN  4 L  


 


Creatinine  0.4 L  


 


Est GFR ( Amer)  > 60  


 


Est GFR (Non-Af Amer)  > 60  


 


Random Glucose  102  


 


Calcium  9.0  


 


Total Bilirubin  1.2  


 


AST  41 H D  


 


ALT  64 H D  


 


Alkaline Phosphatase  95  


 


Troponin I  < 0.0120  


 


NT-Pro-B Natriuret Pep  1120 H  


 


Total Protein  6.8  


 


Albumin  3.7  


 


Globulin  3.1  


 


Albumin/Globulin Ratio  1.2  


 


Urine Color   


 


Urine Clarity   


 


Urine pH   


 


Ur Specific Gravity   


 


Urine Protein   


 


Urine Glucose (UA)   


 


Urine Ketones   


 


Urine Blood   


 


Urine Nitrate   


 


Urine Bilirubin   


 


Urine Urobilinogen   


 


Ur Leukocyte Esterase   


 


Urine RBC (Auto)   


 


Urine Microscopic WBC   














  04/02/18





  13:35


 


WBC 


 


RBC 


 


Hgb 


 


Hct 


 


MCV 


 


MCH 


 


MCHC 


 


RDW 


 


Plt Count 


 


MPV 


 


Neut % (Auto) 


 


Lymph % (Auto) 


 


Mono % (Auto) 


 


Eos % (Auto) 


 


Baso % (Auto) 


 


Neut # (Auto) 


 


Lymph # (Auto) 


 


Mono # (Auto) 


 


Eos # (Auto) 


 


Baso # (Auto) 


 


PT 


 


INR 


 


APTT 


 


Sodium 


 


Potassium 


 


Chloride 


 


Carbon Dioxide 


 


Anion Gap 


 


BUN 


 


Creatinine 


 


Est GFR ( Amer) 


 


Est GFR (Non-Af Amer) 


 


Random Glucose 


 


Calcium 


 


Total Bilirubin 


 


AST 


 


ALT 


 


Alkaline Phosphatase 


 


Troponin I 


 


NT-Pro-B Natriuret Pep 


 


Total Protein 


 


Albumin 


 


Globulin 


 


Albumin/Globulin Ratio 


 


Urine Color  Straw


 


Urine Clarity  Clear


 


Urine pH  7.0


 


Ur Specific Gravity  < 1.005


 


Urine Protein  Negative


 


Urine Glucose (UA)  Neg


 


Urine Ketones  Negative


 


Urine Blood  Negative


 


Urine Nitrate  Negative


 


Urine Bilirubin  Negative


 


Urine Urobilinogen  0.2-1.0


 


Ur Leukocyte Esterase  Neg


 


Urine RBC (Auto)  2


 


Urine Microscopic WBC  < 1














Assessment & Plan





- Assessment and Plan (Free Text)


Assessment: 





Imp; Multiple myeloma, Skeletal metastasis., congestive heart failure.


Plan: 





Plan; She is being treated now with diuretics and is better. Will give her the 

chemotherapy on maonday, April 9th





- Date & Time


Date: 04/03/18


Time: 11:19

## 2018-04-03 NOTE — CP.PCM.CON
History of Present Illness





- History of Present Illness


History of Present Illness: 





                                                this 62-year-old female,a 

hypertensive with a history of multiple myeloma has chronic atrial fibrillation 

and congestive cardiac failure which is left ventricular, diastolic and chronic

, arrived in the hospital profoundly short of breath and orthopneic after 

having eaten salty fish for 3-4 days. The patient was hospitalized in 2016 and 

17 with congestive cardiac failure and has been taking furosemide as well as a 

beta blocker along with digoxin for rate control as well as lisinopril. She has 

been on oral anticoagulation for preventing systemic embolization. She also 

reports having noticed bloating of her abdomen and swelling of her feet at the 

same time she began to get short of breath. Having received intravenous 

furosemide she has diuresed profusely and reports significant relief of her 

symptoms.


Patient never been a smoker and has never suffered a myocardial infarction. She 

is not a diabetic.


Physical examination shows an elderly female who is still mildly dyspneic with 

a respiratory rate of approximately 18 breaths per minute while at rest she 

breathes comfortably propped up in bed. Has a heart rate of 76 bpm irregularly 

irregular and a blood pressure of 130/70 millimeters Hg. Her jugular venous 

pressure was not elevated. There was minimal pitting edema of both lower 

extremities. The pedal pulses were well felt. There were no carotid bruits. The 

apex was not palpable. The 1st heart sounds was variable in intensity the 

second heart sound was normal. No murmurs or gallop was audible.There were 

coarse rales at both bases. Abdomen was soft liver and spleen are not palpable.


Her electrocardiogram showed atrial fibrillation at moderate heart rates 

nonspecific ST-T changes. The QRS voltage was borderline for left ventricular 

hypertrophy.the lab data was noted. Review of her echocardiogram from February 2016 demonstrates a hypertrophied left ventricle with preserved systolic 

function and depressed diastolic compliance and a dilated left atrium, a 

pattern suggestive of severe diastolic left ventricular dysfunction.





impression: congestive cardiac failure which is left ventricular diastolic and 

acute on chronic precipitated by excessive salt intake. Hypertension, multiple 

myeloma, chronic atrial fibrillation.


The patient is being diuresed. I have requested an echocardiogram to evaluate 

her left ventricle systolic function. And I have explained to the patient in 

great detail to avoid salty food.





Past Patient History





- Infectious Disease


Hx of Infectious Diseases: None





- Tetanus Immunizations


Tetanus Immunization: Unknown





- Past Medical History & Family History


Past Medical History?: Yes





- Past Social History


Smoking Status: Never Smoked





- CARDIAC


Hx Cardiac Disorders: Yes


Hx Atrial Fibrillation: Yes


Hx Cardia Arrhythmia: Yes


Hx Congestive Heart Failure: Yes


Hx Hypertension: Yes





- PULMONARY


Hx Respiratory Disorders: Yes


Hx Pneumonia: Yes





- NEUROLOGICAL


Hx Neurological Disorder: No





- HEENT


Hx HEENT Problems: No





- RENAL


Hx Chronic Kidney Disease: No





- ENDOCRINE/METABOLIC


Hx Endocrine Disorders: No





- HEMATOLOGICAL/ONCOLOGICAL


Hx Blood Disorders: Yes


Hx Chemotherapy: Yes


Hx Human Immunodeficiency Virus (HIV): No


Other/Comment: Multiple Myeloma





- INTEGUMENTARY


Hx Dermatological Problems: No





- MUSCULOSKELETAL/RHEUMATOLOGICAL


Hx Musculoskeletal Disorders: No


Hx Falls: No





- GASTROINTESTINAL


Hx Gastrointestinal Disorders: No





- GENITOURINARY/GYNECOLOGICAL


Hx Genitourinary Disorders: No





- PSYCHIATRIC


Hx Psychophysiologic Disorder: No


Hx Substance Use: No





- SURGICAL HISTORY


Hx Surgeries: Yes


Hx Appendectomy: Yes


Hx Hysterectomy: Yes (due to fibroids)





- ANESTHESIA


Hx Anesthesia: Yes


Hx Anesthesia Reactions: No


Hx Malignant Hyperthermia: No


Has any member of the family had a problem w/ anesthesia?: No





Meds


Allergies/Adverse Reactions: 


 Allergies











Allergy/AdvReac Type Severity Reaction Status Date / Time


 


No Known Allergies Allergy   Verified 12/05/17 09:48














- Medications


Medications: 


 Current Medications





Albuterol (Ventolin Hfa 90 Mcg/Actuation (8 G))  2 puff IH Q4 PRN


   PRN Reason: Shortness of Breath


Allopurinol (Zyloprim)  100 mg PO DAILY [facility]


   Last Admin: 04/03/18 09:11 Dose:  100 mg


Aspirin (Ecotrin)  81 mg PO DAILY [facility]


   Last Admin: 04/03/18 09:09 Dose:  81 mg


Carvedilol (Coreg)  25 mg PO Q12 [facility]


   Last Admin: 04/03/18 09:08 Dose:  25 mg


Dexamethasone (Decadron)  10 mg PO BID [facility]


   Last Admin: 04/03/18 09:08 Dose:  10 mg


Digoxin (Digoxin)  0.125 mg PO DAILY [facility]


   Last Admin: 04/03/18 09:08 Dose:  0.125 mg


Famotidine (Pepcid)  20 mg PO DAILY [facility]


   Last Admin: 04/03/18 09:09 Dose:  20 mg


Furosemide (Lasix)  40 mg IV DAILY [facility]


   Last Admin: 04/03/18 09:09 Dose:  40 mg


Home Med (Lenalidomide [Revlimid])  15 mg PO DAILY [facility]


   Last Admin: 04/03/18 09:11 Dose:  15 mg


Lisinopril (Zestril)  10 mg PO DAILY [facility]


   Last Admin: 04/03/18 09:10 Dose:  10 mg


Oxycodone/Acetaminophen (Percocet 5/325 Mg Tab)  1 tab PO Q6 PRN


   PRN Reason: Pain, severe (8-10)


   Stop: 04/05/18 16:15


   Last Admin: 04/02/18 21:11 Dose:  1 tab


Potassium Chloride (K-Dur 20 Meq Er Tab)  20 meq PO BID [facility]


   Last Admin: 04/03/18 09:09 Dose:  20 meq


Rivaroxaban (Xarelto)  20 mg PO DAILY [facility]


   PRN Reason: Protocol


   Last Admin: 04/03/18 09:10 Dose:  20 mg











Results





- Vital Signs


Recent Vital Signs: 


 Last Vital Signs











Temp  98.8 F   04/03/18 12:00


 


Pulse  81   04/03/18 12:00


 


Resp  18   04/03/18 12:00


 


BP  118/74   04/03/18 12:00


 


Pulse Ox  96   04/03/18 12:00














- Labs


Result Diagrams: 


 04/02/18 13:35





 04/02/18 13:35


Labs: 


 Laboratory Results - last 24 hr











  04/02/18 04/02/18 04/02/18





  13:35 13:35 13:35


 


WBC   5.1 


 


RBC   4.18 


 


Hgb   12.7 


 


Hct   37.5 


 


MCV   89.7 


 


MCH   30.3 


 


MCHC   33.7 


 


RDW   16.4 H 


 


Plt Count   263 


 


MPV   8.5 


 


Neut % (Auto)   69.7 


 


Lymph % (Auto)   15.4 L 


 


Mono % (Auto)   12.6 H 


 


Eos % (Auto)   1.6 


 


Baso % (Auto)   0.7 


 


Neut # (Auto)   3.6 


 


Lymph # (Auto)   0.8 L 


 


Mono # (Auto)   0.6 


 


Eos # (Auto)   0.1 


 


Baso # (Auto)   0.0 


 


PT    18.9 H


 


INR    1.7 H


 


APTT    44.7 H


 


Sodium  142  


 


Potassium  3.0 L  


 


Chloride  103  


 


Carbon Dioxide  27  


 


Anion Gap  15  


 


BUN  4 L  


 


Creatinine  0.4 L  


 


Est GFR ( Amer)  > 60  


 


Est GFR (Non-Af Amer)  > 60  


 


Random Glucose  102  


 


Calcium  9.0  


 


Total Bilirubin  1.2  


 


AST  41 H D  


 


ALT  64 H D  


 


Alkaline Phosphatase  95  


 


Troponin I  < 0.0120  


 


NT-Pro-B Natriuret Pep  1120 H  


 


Total Protein  6.8  


 


Albumin  3.7  


 


Globulin  3.1  


 


Albumin/Globulin Ratio  1.2  


 


Urine Color   


 


Urine Clarity   


 


Urine pH   


 


Ur Specific Gravity   


 


Urine Protein   


 


Urine Glucose (UA)   


 


Urine Ketones   


 


Urine Blood   


 


Urine Nitrate   


 


Urine Bilirubin   


 


Urine Urobilinogen   


 


Ur Leukocyte Esterase   


 


Urine RBC (Auto)   


 


Urine Microscopic WBC   














  04/02/18





  13:35


 


WBC 


 


RBC 


 


Hgb 


 


Hct 


 


MCV 


 


MCH 


 


MCHC 


 


RDW 


 


Plt Count 


 


MPV 


 


Neut % (Auto) 


 


Lymph % (Auto) 


 


Mono % (Auto) 


 


Eos % (Auto) 


 


Baso % (Auto) 


 


Neut # (Auto) 


 


Lymph # (Auto) 


 


Mono # (Auto) 


 


Eos # (Auto) 


 


Baso # (Auto) 


 


PT 


 


INR 


 


APTT 


 


Sodium 


 


Potassium 


 


Chloride 


 


Carbon Dioxide 


 


Anion Gap 


 


BUN 


 


Creatinine 


 


Est GFR ( Amer) 


 


Est GFR (Non-Af Amer) 


 


Random Glucose 


 


Calcium 


 


Total Bilirubin 


 


AST 


 


ALT 


 


Alkaline Phosphatase 


 


Troponin I 


 


NT-Pro-B Natriuret Pep 


 


Total Protein 


 


Albumin 


 


Globulin 


 


Albumin/Globulin Ratio 


 


Urine Color  Straw


 


Urine Clarity  Clear


 


Urine pH  7.0


 


Ur Specific Gravity  < 1.005


 


Urine Protein  Negative


 


Urine Glucose (UA)  Neg


 


Urine Ketones  Negative


 


Urine Blood  Negative


 


Urine Nitrate  Negative


 


Urine Bilirubin  Negative


 


Urine Urobilinogen  0.2-1.0


 


Ur Leukocyte Esterase  Neg


 


Urine RBC (Auto)  2


 


Urine Microscopic WBC  < 1

## 2018-04-03 NOTE — CP.PCM.PN
Subjective





- Date & Time of Evaluation


Date of Evaluation: 04/03/18


Time of Evaluation: 11:30





- Subjective


Subjective: 





Patient seen and examined. Claimed she felt better although appeared still 

catching up breath.





Objective





- Vital Signs/Intake and Output


Vital Signs (last 24 hours): 


 











Temp Pulse Resp BP Pulse Ox


 


 98.4 F   77   19   105/68   97 


 


 04/03/18 15:45  04/03/18 15:45  04/03/18 15:45  04/03/18 15:45  04/03/18 15:45











- Medications


Medications: 


 Current Medications





Albuterol (Ventolin Hfa 90 Mcg/Actuation (8 G))  2 puff IH Q4 PRN


   PRN Reason: Shortness of Breath


Allopurinol (Zyloprim)  100 mg PO DAILY [facility]


   Last Admin: 04/03/18 09:11 Dose:  100 mg


Aspirin (Ecotrin)  81 mg PO DAILY [facility]


   Last Admin: 04/03/18 09:09 Dose:  81 mg


Carvedilol (Coreg)  25 mg PO Q12 [facility]


   Last Admin: 04/03/18 09:08 Dose:  25 mg


Dexamethasone (Decadron)  10 mg PO BID [facility]


   Last Admin: 04/03/18 16:11 Dose:  10 mg


Digoxin (Digoxin)  0.125 mg PO DAILY [facility]


   Last Admin: 04/03/18 09:08 Dose:  0.125 mg


Famotidine (Pepcid)  20 mg PO DAILY [facility]


   Last Admin: 04/03/18 09:09 Dose:  20 mg


Furosemide (Lasix)  40 mg IV DAILY [facility]


   Last Admin: 04/03/18 09:09 Dose:  40 mg


Guaifenesin/Dextromethorphan (Robitussin Dm)  10 ml PO Q4 PRN


   PRN Reason: Cough


   Last Admin: 04/03/18 17:08 Dose:  10 ml


Home Med (Lenalidomide [Revlimid])  15 mg PO DAILY [facility]


   Last Admin: 04/03/18 09:11 Dose:  15 mg


Lisinopril (Zestril)  10 mg PO DAILY [facility]


   Last Admin: 04/03/18 09:10 Dose:  10 mg


Oxycodone/Acetaminophen (Percocet 5/325 Mg Tab)  1 tab PO Q6 PRN


   PRN Reason: Pain, severe (8-10)


   Stop: 04/05/18 16:15


   Last Admin: 04/02/18 21:11 Dose:  1 tab


Potassium Chloride (K-Dur 20 Meq Er Tab)  20 meq PO BID [facility]


   Last Admin: 04/03/18 16:11 Dose:  20 meq


Rivaroxaban (Xarelto)  20 mg PO DAILY [facility]


   PRN Reason: Protocol


   Last Admin: 04/03/18 09:10 Dose:  20 mg











- Labs


Labs: 


 





 04/02/18 13:35 





 04/03/18 11:05 





 











PT  18.9 Seconds (9.8-13.1)  H  04/02/18  13:35    


 


INR  1.7  (0.9-1.2)  H  04/02/18  13:35    


 


APTT  44.7 Seconds (25.6-37.1)  H  04/02/18  13:35    














- Constitutional


Appears: No Acute Distress





- Head Exam


Head Exam: ATRAUMATIC





- Eye Exam


Eye Exam: absent: Scleral icterus





- ENT Exam


ENT Exam: Mucous Membranes Moist





- Neck Exam


Neck Exam: absent: Meningismus





- Respiratory Exam


Respiratory Exam: Decreased Breath Sounds.  absent: Rhonchi, Wheezes





- Cardiovascular Exam


Cardiovascular Exam: Irregular Rhythm





- GI/Abdominal Exam


GI & Abdominal Exam: Soft.  absent: Tenderness





- Rectal Exam


Rectal Exam: Deferred





- Back Exam


Back Exam: NORMAL INSPECTION





- Neurological Exam


Neurological Exam: Alert, Oriented x3





- Psychiatric Exam


Psychiatric exam: Normal Affect





- Skin


Skin Exam: Dry, Intact





Assessment and Plan





- Assessment and Plan (Free Text)


Assessment: 





61 yo female with history of AFib, HTN, MMyeloma and CHF came in complaining of 

sudden onset of SOB upon waking up this morning. Accompanying symptoms were 

orthopnea with 4 pillows and on going cough productive with yellow sputum for a 

few days. Denied chest pain, fever, chills or abdominal pain. 








1. CHF exacerbation


continue Lasix 40mg IV


continue Lisinopril


Troponins: negative for ischemia


ECHO: offical report pending


Dr Montalvo called for cardiology consult








2. AFib


rate controlled


continue Digoxin and Carvedilol


continue Xarelto








3. HTN


BP stable


continue Lisinopril








4. Multiple Myeloma


continue Revlimid 15mg PO daily


Dr Daniel on consult

## 2018-04-03 NOTE — CARD
--------------- APPROVED REPORT --------------





EKG Measurement

Heart [id number]

[id number]

[id number]

[id number]



<Conclusion>

Atrial fibrillation

ST & T wave abnormality, consider lateral ischemia

Abnormal ECG

## 2018-04-04 VITALS — RESPIRATION RATE: 18 BRPM

## 2018-04-04 VITALS — DIASTOLIC BLOOD PRESSURE: 77 MMHG | SYSTOLIC BLOOD PRESSURE: 121 MMHG | TEMPERATURE: 98.6 F

## 2018-04-04 VITALS — HEART RATE: 90 BPM

## 2018-04-04 VITALS — OXYGEN SATURATION: 99 % | HEART RATE: 79 BPM

## 2018-04-04 LAB
BUN SERPL-MCNC: 15 MG/DL (ref 7–17)
CALCIUM SERPL-MCNC: 9.5 MG/DL (ref 8.4–10.2)
GFR NON-AFRICAN AMERICAN: > 60

## 2018-04-04 RX ADMIN — POTASSIUM CHLORIDE SCH MEQ: 20 TABLET, EXTENDED RELEASE ORAL at 09:33

## 2018-04-04 RX ADMIN — DIGOXIN SCH MG: 0.12 TABLET ORAL at 09:29

## 2018-04-04 RX ADMIN — GUAIFENESIN AND DEXTROMETHORPHAN PRN ML: 100; 10 SYRUP ORAL at 09:31

## 2018-04-04 NOTE — CP.PCM.DIS
Provider





- Provider


Date of Admission: 


04/03/18 14:09





Attending physician: 


Pérez Lockett MD





Primary care physician: 





Dr Pickering


Consults: 





Cardio; Dr DOMONIQUE Otto


Oncology: Dr ASHER Otto


Time Spent in preparation of Discharge (in minutes): 35





Diagnosis





- Discharge Diagnosis


(1) CHF exacerbation


Status: Acute   





(2) Chronic atrial fibrillation


Status: Chronic   





(3) Multiple myeloma


Status: Chronic   





Hospital Course





- Lab Results


Lab Results: 


 Most Recent Lab Values











WBC  5.1 K/uL (4.8-10.8)   04/02/18  13:35    


 


RBC  4.18 Mil/uL (3.80-5.20)   04/02/18  13:35    


 


Hgb  12.7 g/dL (12.0-16.0)   04/02/18  13:35    


 


Hct  37.5 % (34.0-47.0)   04/02/18  13:35    


 


MCV  89.7 fl (81.0-99.0)   04/02/18  13:35    


 


MCH  30.3 pg (27.0-31.0)   04/02/18  13:35    


 


MCHC  33.7 g/dL (33.0-37.0)   04/02/18  13:35    


 


RDW  16.4 % (11.5-14.5)  H  04/02/18  13:35    


 


Plt Count  263 K/uL (130-400)   04/02/18  13:35    


 


MPV  8.5 fl (7.2-11.7)   04/02/18  13:35    


 


Neut % (Auto)  69.7 % (50.0-75.0)   04/02/18  13:35    


 


Lymph % (Auto)  15.4 % (20.0-40.0)  L  04/02/18  13:35    


 


Mono % (Auto)  12.6 % (0.0-10.0)  H  04/02/18  13:35    


 


Eos % (Auto)  1.6 % (0.0-4.0)   04/02/18  13:35    


 


Baso % (Auto)  0.7 % (0.0-2.0)   04/02/18  13:35    


 


Neut # (Auto)  3.6 K/uL (1.8-7.0)   04/02/18  13:35    


 


Lymph # (Auto)  0.8 K/uL (1.0-4.3)  L  04/02/18  13:35    


 


Mono # (Auto)  0.6 K/uL (0.0-0.8)   04/02/18  13:35    


 


Eos # (Auto)  0.1 K/uL (0.0-0.7)   04/02/18  13:35    


 


Baso # (Auto)  0.0 K/uL (0.0-0.2)   04/02/18  13:35    


 


PT  18.9 Seconds (9.8-13.1)  H  04/02/18  13:35    


 


INR  1.7  (0.9-1.2)  H  04/02/18  13:35    


 


APTT  44.7 Seconds (25.6-37.1)  H  04/02/18  13:35    


 


Sodium  142 mmol/l (132-148)   04/04/18  04:20    


 


Potassium  3.8 MMOL/L (3.6-5.0)   04/04/18  04:20    


 


Chloride  106 mmol/L ()   04/04/18  04:20    


 


Carbon Dioxide  26 mmol/L (22-30)   04/04/18  04:20    


 


Anion Gap  14  (10-20)   04/04/18  04:20    


 


BUN  15 mg/dl (7-17)   04/04/18  04:20    


 


Creatinine  0.4 mg/dl (0.7-1.2)  L  04/04/18  04:20    


 


Est GFR ( Amer)  > 60   04/04/18  04:20    


 


Est GFR (Non-Af Amer)  > 60   04/04/18  04:20    


 


Random Glucose  142 mg/dL ()  H  04/04/18  04:20    


 


Calcium  9.5 mg/dL (8.4-10.2)   04/04/18  04:20    


 


Total Bilirubin  1.2 mg/dl (0.2-1.3)   04/02/18  13:35    


 


AST  41 U/L (14-36)  H D 04/02/18  13:35    


 


ALT  64 U/L (9-52)  H D 04/02/18  13:35    


 


Alkaline Phosphatase  95 U/L ()   04/02/18  13:35    


 


Troponin I  < 0.0120 ng/mL (0.00-0.120)   04/02/18  13:35    


 


NT-Pro-B Natriuret Pep  1120 pg/ml (0-900)  H  04/02/18  13:35    


 


Total Protein  6.8 G/DL (6.3-8.2)   04/02/18  13:35    


 


Albumin  3.7 g/dL (3.5-5.0)   04/02/18  13:35    


 


Globulin  3.1 gm/dL (2.2-3.9)   04/02/18  13:35    


 


Albumin/Globulin Ratio  1.2  (1.0-2.1)   04/02/18  13:35    


 


Urine Color  Straw  (YELLOW)   04/02/18  13:35    


 


Urine Clarity  Clear  (Clear)   04/02/18  13:35    


 


Urine pH  7.0  (5.0-8.0)   04/02/18  13:35    


 


Ur Specific Gravity  < 1.005  (1.003-1.030)   04/02/18  13:35    


 


Urine Protein  Negative mg/dL (NEGATIVE)   04/02/18  13:35    


 


Urine Glucose (UA)  Neg mg/dL (Normal)   04/02/18  13:35    


 


Urine Ketones  Negative mg/dL (NEGATIVE)   04/02/18  13:35    


 


Urine Blood  Negative  (NEGATIVE)   04/02/18  13:35    


 


Urine Nitrate  Negative  (NEGATIVE)   04/02/18  13:35    


 


Urine Bilirubin  Negative  (NEGATIVE)   04/02/18  13:35    


 


Urine Urobilinogen  0.2-1.0 mg/dL (0.2-1.0)   04/02/18  13:35    


 


Ur Leukocyte Esterase  Neg Ely/uL (Negative)   04/02/18  13:35    


 


Urine RBC (Auto)  2 /hpf (0-3)   04/02/18  13:35    


 


Urine Microscopic WBC  < 1 /hpf (0-5)   04/02/18  13:35    














- Hospital Course


Hospital Course: 





63 yo female with history of AFib, HTN, Multiple Myeloma and Diatolic CHF came 

in complaining of sudden onset of SOB upon waking up in the morning. 

Accompanying symptoms were orthopnea with 4 pillows, pedal edema  and on going 

cough productive with yellow sputum for a few days. Denied chest pain, fever, 

chills or abdominal pain. 








1. Acute Diastolic CHF exacerbation


received  Lasix 40mg IV


improved with diuretics


continue Lisinopril and Cored


Troponins: negative 


ECHO: normal LV function


Dr Montalvo : cardiology consult


SOB now resolved, pedal edema resolved 








2. AFib, chronic 


rate controlled


continue Digoxin and Carvedilol


continue Xarelto








3. HTN


BP stable


continue Lisinopril, Coreg and Lasix 








4. Multiple Myeloma on chemotherapy


continue Revlimid 15mg PO daily and Decadron


Dr Daniel on consulted- for Chemotherapy April 9





Discharge Exam





- Head Exam


Head Exam: ATRAUMATIC





- Eye Exam


Eye Exam: EOMI, Normal appearance


Pupil Exam: NORMAL ACCOMODATION





- ENT Exam


ENT Exam: Mucous Membranes Moist, Normal External Ear Exam





- Neck Exam


Neck exam: Full Rom





- Respiratory Exam


Respiratory Exam: Rhonchi, NORMAL BREATHING PATTERN.  absent: Rales, 

Respiratory Distress





- Cardiovascular Exam


Cardiovascular Exam: Irregular Rhythm, +S1, +S2





- GI/Abdominal Exam


GI & Abdominal Exam: Normal Bowel Sounds, Soft.  absent: Tenderness





- Extremities Exam


Extremities exam: full ROM, normal capillary refill, pedal pulses present





- Back Exam


Back exam: FULL ROM.  absent: CVA tenderness (L), CVA tenderness (R)





- Neurological Exam


Neurological exam: Alert, CN II-XII Intact, Oriented x3, Reflexes Normal





- Psychiatric Exam


Psychiatric exam: Normal Affect, Normal Mood





- Skin


Skin Exam: Dry, Normal Color, Warm





Discharge Plan





- Discharge Medications


Prescriptions: 


guaiFENesin/Dextromethorphan [Robitussin DM] 10 ml PO Q4 PRN #200 ml


 PRN Reason: Cough





- Follow Up Plan


Condition: GOOD


Disposition: HOME/ ROUTINE


Instructions:  Heart Healthy Diet, Heart Failure, Adult (DC)


Additional Instructions: 


ff up  with Dr Ladan nur


appt with Dr ASHER Otto  for Chemotx on Monday 4/9/18 at 10:00am 6th floor 

infusion center 


appt with Dr DOMONIQUE Otto in 1 wk


Low salt diet


Referrals: 


Nasim Otto MD [Staff Provider] - 


Tong Pickering MD [Family Provider] - 





Clinical Quality Measures





- CQM - Heart Failure


Ejection Fraction: 40 % or Greater


Left Ventricular Function to be assessed after discharge: Yes


ACE Inhibitor Prescribed: Yes


Beta-Blocker Prescribed: Carvedilol


Angiotensin II Receptor Blocker Prescribed: No


Contraindication/Reason for not providing: pt on ACE


AnticoagulationTherapy for Atrial Fibrillation/Atrialflutter: Yes


Aldosterone Antagonist Prescribed: No


Contraindication/Reason for not providing: BP normal


Hydralazine Nitrate Prescribed: No


Contraindication/Reason for not providing:  normal 


Implantable Cardioverter Defibrillator Therapy: No


Contraindication/Reason for not providing: N/A


Cardiac Resynchronization Therapy Prescribed: No


Contraindication/Reason for not providing: N/A


Will be discharged to: Home


Follow Up Date (must be within 7 days from discharge): 04/09/18


Follow Up Time: 10:00





- Date & Time of Discharge Summary


Date of Discharge Summary: 04/04/18


Time of Discharge Summary: 16:38

## 2018-04-04 NOTE — CP.PCM.PN
Subjective





- Date & Time of Evaluation


Date of Evaluation: 04/04/18


Time of Evaluation: 08:05





- Subjective


Subjective: 





                                                     Slept well during the night


                                                     Has diuresed well with 

Lasix


                                                     A fib at 80 BPM, /70 

mm Hg


                                                     JVP flat, no oedema over 

feet


                                                     Labs: BUN/Creatinin has 

remained normal with normal electrolytes


                                                     Echo shows Normal LV syst 

function with markedly dilated LA


                                                     (Clear sign of LV 

diastolic dysfunction in absence of Mitral disease)


                                                     May go home, on present Rx


                                                    Pt instructed to avoid 

salty food and to weigh herself daily


        





Objective





- Vital Signs/Intake and Output


Vital Signs (last 24 hours): 


 











Temp Pulse Resp BP Pulse Ox


 


 98.2 F   90   18   145/88   96 


 


 04/04/18 07:46  04/04/18 07:46  04/04/18 07:46  04/04/18 07:46  04/04/18 07:46











- Medications


Medications: 


 Current Medications





Albuterol (Ventolin Hfa 90 Mcg/Actuation (8 G))  2 puff IH Q4 PRN


   PRN Reason: Shortness of Breath


Allopurinol (Zyloprim)  100 mg PO DAILY [facility]


   Last Admin: 04/03/18 09:11 Dose:  100 mg


Aspirin (Ecotrin)  81 mg PO DAILY [facility]


   Last Admin: 04/03/18 09:09 Dose:  81 mg


Carvedilol (Coreg)  25 mg PO Q12 [facility]


   Last Admin: 04/03/18 20:39 Dose:  25 mg


Dexamethasone (Decadron)  10 mg PO BID [facility]


   Last Admin: 04/03/18 16:11 Dose:  10 mg


Digoxin (Digoxin)  0.125 mg PO DAILY [facility]


   Last Admin: 04/03/18 09:08 Dose:  0.125 mg


Famotidine (Pepcid)  20 mg PO DAILY [facility]


   Last Admin: 04/03/18 09:09 Dose:  20 mg


Furosemide (Lasix)  40 mg IV DAILY [facility]


   Last Admin: 04/03/18 09:09 Dose:  40 mg


Guaifenesin/Dextromethorphan (Robitussin Dm)  10 ml PO Q4 PRN


   PRN Reason: Cough


   Last Admin: 04/03/18 22:48 Dose:  10 ml


Home Med (Lenalidomide [Revlimid])  15 mg PO DAILY [facility]


   Last Admin: 04/03/18 09:11 Dose:  15 mg


Lisinopril (Zestril)  10 mg PO DAILY [facility]


   Last Admin: 04/03/18 09:10 Dose:  10 mg


Oxycodone/Acetaminophen (Percocet 5/325 Mg Tab)  1 tab PO Q6 PRN


   PRN Reason: Pain, severe (8-10)


   Stop: 04/05/18 16:15


   Last Admin: 04/03/18 20:40 Dose:  1 tab


Potassium Chloride (K-Dur 20 Meq Er Tab)  20 meq PO BID [facility]


   Last Admin: 04/03/18 16:11 Dose:  20 meq


Rivaroxaban (Xarelto)  20 mg PO DAILY [facility]


   PRN Reason: Protocol


   Last Admin: 04/03/18 09:10 Dose:  20 mg











- Labs


Labs: 


 





 04/02/18 13:35 





 04/04/18 04:20 





 











PT  18.9 Seconds (9.8-13.1)  H  04/02/18  13:35    


 


INR  1.7  (0.9-1.2)  H  04/02/18  13:35    


 


APTT  44.7 Seconds (25.6-37.1)  H  04/02/18  13:35

## 2018-04-04 NOTE — CARD
--------------- APPROVED REPORT --------------





EXAM: Two-dimensional and M-mode echocardiogram with Doppler and 

color Doppler.



Other Information 

Quality : AverageRhythm : Atrial Fibrillation

Technically limited study due to  body habitus.



INDICATION

Congestive Heart Failure 



2D DIMENSIONS 

IVSd1.31   (0.7-1.1cm)LVDd4.62   (3.9-5.9cm)

LVOT Diameter2.20   (1.8-2.4cm)PWd0.95   (0.7-1.1cm)

IVSs1.41   (0.8-1.2cm)LVDs2.83   (2.5-4.0cm)

FS (%) 38.7   %PWs1.09   (0.8-1.2cm)



M-Mode DIMENSIONS 

Left Atrium (MM)6.43   (2.5-4.0cm)IVSd1.01   (0.7-1.1cm)

Aortic Root3.28   (2.2-3.7cm)LVDd5.20   (4.0-5.6cm)

Aortic Cusp Exc.1.96   (1.5-2.0cm)PWd1.01   (0.7-1.1cm)

IVSs1.54   cmFS (%) 38   %

LVDs3.21   (2.0-3.8cm)PWs1.68   cm



Mitral Valve

E/A ratio0.0



TDI

E/Lateral E'0.0E/Medial E'0.0



Pulmonary Valve

PV Peak Yxyuqlpm28.0cm/s



 LEFT VENTRICLE 

The left ventricle is normal size.

There is normal left ventricular wall thickness.

The left ventricular function is normal.

The left ventricular ejection fraction is  60%

There is normal LV segmental wall motion.

not reviewed due to atrial fibrillation

No left ventricle thrombus noted on this study.

There is no ventricular septal defect visualized.

There is no left ventricular aneurysm.

There is no mass noted in the left ventricle.



 RIGHT VENTRICLE 

The right ventricle is normal size.

There is normal right ventricular wall thickness.

The right ventricular systolic function is normal.



 ATRIA 

The left atrium is severely dilated.

The right atrium size is normal.

The interatrial septum is intact with no evidence for an atrial 

septal defect.



 AORTIC VALVE 

The aortic valve is normal in structure.

No aortic regurgitation is present.

There is no aortic valvular stenosis.

There is no aortic valvular vegetation.



 MITRAL VALVE 

The mitral valve is normal in structure.

There is no evidence of mitral valve prolapse.

There is no mitral valve stenosis.

Mitral regurgitation is trace to mild.



 TRICUSPID VALVE 

The tricuspid valve is normal in structure.

There is no tricuspid valve regurgitation noted.

There is no tricuspid valve prolapse or vegetation.

There is no tricuspid valve stenosis.



 PULMONIC VALVE 

The pulmonary valve is normal in structure.

There is no pulmonic valvular regurgitation. 

There is no pulmonic valvular stenosis.



 GREAT VESSELS 

The aortic root is normal in size.

The ascending aorta is normal in size.

The IVC is normal in size and collapses >50% with inspiration.



 PERICARDIAL EFFUSION 

The pericardium appears normal.

There is no pleural effusion.



<Conclusion>

Normal LV systolic function

Left Atrial Enlargement

Trace to Mild Mitral Regurgitation

## 2018-04-04 NOTE — CP.PCM.PN
Subjective





- Date & Time of Evaluation


Date of Evaluation: 04/04/18


Time of Evaluation: 08:49





- Subjective


Subjective: 





Pt is afebrile, in no respiratory distress. She is doig better since she was 

diuresed. Vital signs stable


she will return to the infusion center om monday to continue her chemotherapy





Objective





- Vital Signs/Intake and Output


Vital Signs (last 24 hours): 


 











Temp Pulse Resp BP Pulse Ox


 


 98.2 F   90   18   145/88   96 


 


 04/04/18 07:46  04/04/18 07:46  04/04/18 07:46  04/04/18 07:46  04/04/18 07:46











- Medications


Medications: 


 Current Medications





Albuterol (Ventolin Hfa 90 Mcg/Actuation (8 G))  2 puff IH Q4 PRN


   PRN Reason: Shortness of Breath


Allopurinol (Zyloprim)  100 mg PO DAILY [facility]


   Last Admin: 04/03/18 09:11 Dose:  100 mg


Aspirin (Ecotrin)  81 mg PO DAILY [facility]


   Last Admin: 04/03/18 09:09 Dose:  81 mg


Carvedilol (Coreg)  25 mg PO Q12 [facility]


   Last Admin: 04/03/18 20:39 Dose:  25 mg


Dexamethasone (Decadron)  10 mg PO BID [facility]


   Last Admin: 04/03/18 16:11 Dose:  10 mg


Digoxin (Digoxin)  0.125 mg PO DAILY [facility]


   Last Admin: 04/03/18 09:08 Dose:  0.125 mg


Famotidine (Pepcid)  20 mg PO DAILY [facility]


   Last Admin: 04/03/18 09:09 Dose:  20 mg


Furosemide (Lasix)  40 mg IV DAILY [facility]


   Last Admin: 04/03/18 09:09 Dose:  40 mg


Guaifenesin/Dextromethorphan (Robitussin Dm)  10 ml PO Q4 PRN


   PRN Reason: Cough


   Last Admin: 04/03/18 22:48 Dose:  10 ml


Home Med (Lenalidomide [Revlimid])  15 mg PO DAILY [facility]


   Last Admin: 04/03/18 09:11 Dose:  15 mg


Lisinopril (Zestril)  10 mg PO DAILY [facility]


   Last Admin: 04/03/18 09:10 Dose:  10 mg


Oxycodone/Acetaminophen (Percocet 5/325 Mg Tab)  1 tab PO Q6 PRN


   PRN Reason: Pain, severe (8-10)


   Stop: 04/05/18 16:15


   Last Admin: 04/03/18 20:40 Dose:  1 tab


Potassium Chloride (K-Dur 20 Meq Er Tab)  20 meq PO BID [facility]


   Last Admin: 04/03/18 16:11 Dose:  20 meq


Rivaroxaban (Xarelto)  20 mg PO DAILY [facility]


   PRN Reason: Protocol


   Last Admin: 04/03/18 09:10 Dose:  20 mg











- Labs


Labs: 


 





 04/02/18 13:35 





 04/04/18 04:20 





 











PT  18.9 Seconds (9.8-13.1)  H  04/02/18  13:35    


 


INR  1.7  (0.9-1.2)  H  04/02/18  13:35    


 


APTT  44.7 Seconds (25.6-37.1)  H  04/02/18  13:35

## 2018-04-08 ENCOUNTER — HOSPITAL ENCOUNTER (EMERGENCY)
Dept: HOSPITAL 14 - H.ER | Age: 63
Discharge: HOME | End: 2018-04-08
Payer: MEDICAID

## 2018-04-08 VITALS
RESPIRATION RATE: 16 BRPM | DIASTOLIC BLOOD PRESSURE: 76 MMHG | TEMPERATURE: 98.1 F | HEART RATE: 98 BPM | OXYGEN SATURATION: 96 % | SYSTOLIC BLOOD PRESSURE: 133 MMHG

## 2018-04-08 VITALS — BODY MASS INDEX: 39.6 KG/M2 | HEART RATE: 90 BPM

## 2018-04-08 DIAGNOSIS — M54.40: ICD-10-CM

## 2018-04-08 DIAGNOSIS — M51.37: ICD-10-CM

## 2018-04-08 DIAGNOSIS — M81.0: ICD-10-CM

## 2018-04-08 DIAGNOSIS — N39.0: Primary | ICD-10-CM

## 2018-04-08 DIAGNOSIS — M54.5: ICD-10-CM

## 2018-04-08 LAB
BILIRUB UR-MCNC: NEGATIVE MG/DL
COLOR UR: YELLOW
GLUCOSE UR STRIP-MCNC: (no result) MG/DL
LEUKOCYTE ESTERASE UR-ACNC: (no result) LEU/UL
PH UR STRIP: 6 [PH] (ref 5–8)
PROT UR STRIP-MCNC: NEGATIVE MG/DL
RBC # UR STRIP: NEGATIVE /UL
SP GR UR STRIP: 1.02 (ref 1–1.03)
SQUAMOUS EPITHIAL: 1 /HPF (ref 0–5)
URINE AMORPHOUS SEDIMENT: (no result) /UL
URINE CLARITY: (no result)
UROBILINOGEN UR-MCNC: (no result) MG/DL (ref 0.2–1)

## 2018-04-08 NOTE — RAD
PROCEDURE:  Left Hip X-ray Radiographs.



HISTORY:

Pain  



COMPARISON:

None.



FINDINGS:



BONES:

The pelvic ring is intact.  There is no acute displaced fracture or 

bone destruction. Bone alignment is normal.  There is diffuse bone 

demineralization. 



JOINTS:

Normal. 



SOFT TISSUES:

Normal. 



OTHER FINDINGS:

None.



IMPRESSION:

No acute displaced fracture or dislocation. Please note occult 

fractures cannot be excluded on plain radiographs.  If there is a 

persistent clinical concern, an MRI of the hip may be performed for 

further evaluation.

## 2018-04-08 NOTE — ED PDOC
HPI: Back


Time Seen by Provider: 04/08/18 16:12


Chief Complaint (Nursing): Back Pain


Chief Complaint (Provider): Back Pain


History Per: Patient


History/Exam Limitations: no limitations


Additional Complaint(s): 


Patient reports pain in the left lower back, radiating to left hip and left 

leg. Reports taking Percocet and Tylenol with mild improvement in pain. Patient 

is currently on chemotherapy. Otherwise: (-) urinary symptoms, (-) trauma / 

injury, (-) fall, (-) paresthesias, (-) weakness, (-) acute bowel or bladder 

dysfunction, (-) fever.  Has past medical history of sciatica, and multiple 

myeloma. 





OMD: Tong Pickering MD








Past Medical History


Reviewed: Historical Data, Nursing Documentation, Vital Signs


Vital Signs: 


 Last Vital Signs











Temp  98.1 F   04/08/18 15:47


 


Pulse  98 H  04/08/18 15:47


 


Resp  16   04/08/18 15:47


 


BP  133/76   04/08/18 15:47


 


Pulse Ox  96   04/08/18 15:47














- Medical History


PMH: Atrial Fibrillation, Back Problems, Cardia Arrhythmia, CHF, Fractures (rib)

, HTN, Pneumonia


   Denies: HIV, Chronic Kidney Disease


Other PMH: Multiple myeloma, Sciatica





- Surgical History


Surgical History: Appendectomy, Cholecystectomy





- Family History


Family History: States: Unknown Family Hx





- Social History


Current smoker - smoking cessation education provided: No


Alcohol: None


Drugs: Denies





- Home Medications


Home Medications: 


 Ambulatory Orders











 Medication  Instructions  Recorded


 


Rivaroxaban [Xarelto] 20 mg PO DAILY 02/28/17


 


Lenalidomide [Revlimid] 15 mg PO DAILY 05/23/17


 


Potassium Chloride [K-Dur 20 mEq 20 meq PO BID 08/15/17





ER Tab]  


 


Dexamethasone [Decadron] 10 mg PO BID 03/05/18


 


Albuterol HFA [Ventolin HFA 90 2 puff IH Q4 PRN 04/02/18





mcg/actuation (8 g)]  


 


Allopurinol [Zyloprim] 100 mg PO DAILY 04/02/18


 


Aspirin [Ecotrin] 81 mg PO DAILY 04/02/18


 


Carvedilol [Coreg] 25 mg PO Q12 04/02/18


 


Digoxin [Digitek] 125 mcg PO DAILY 04/02/18


 


Famotidine [Pepcid] 20 mg PO DAILY 04/02/18


 


Lisinopril [Zestril] 10 mg PO DAILY 04/02/18


 


oxyCODONE/Acetaminophen [Percocet 1 tab PO Q6 PRN 04/02/18





5/325 mg Tab]  


 


Furosemide [Lasix] 20 mg PO BID #60 04/04/18


 


guaiFENesin/Dextromethorphan 10 ml PO Q4 PRN #200 ml 04/04/18





[Robitussin DM]  


 


Cephalexin [Keflex] 500 mg PO Q6 #28 capsule 04/08/18


 


Cyclobenzaprine [Cyclobenzaprine 10 mg PO TID PRN #15 tab 04/08/18





HCl]  














- Allergies


Allergies/Adverse Reactions: 


 Allergies











Allergy/AdvReac Type Severity Reaction Status Date / Time


 


No Known Allergies Allergy   Verified 04/08/18 15:46














Review of Systems


ROS Statement: Except As Marked, All Systems Reviewed And Found Negative (As 

per HPI, otherwise negative)


Musculoskeletal: Positive for: Back Pain (lower left)





Physical Exam





- Reviewed


Nursing Documentation Reviewed: Yes


Vital Signs Reviewed: Yes





- Physical Exam


Comments: 


GENERAL APPEARANCE: Patient is awake, alert, oriented x 3, in mild painful 

distress. 


SKIN:  Warm, dry; (-) cyanosis.


EYES:  (-) conjunctival pallor.


ENMT:  Mucous membranes moist.


NECK:  (-) tenderness, (-) stiffness, (-) lymphadenopathy.


CHEST AND RESPIRATORY:  (-) rales, (-) rhonchi, (-) wheezes; breath sounds 

equal bilaterally.


HEART AND CARDIOVASCULAR:  (-) irregularity; (-) murmur, (-) gallop.


ABDOMEN AND GI:  Soft; (-) tenderness; (-) palpable mass.


BACK:  (+) left paralumbar tenderness, (+) mild spasm, (-) direct bony 

tenderness, (-) deformity.  Straight leg raising (-) bilaterally.


EXTREMITIES:  (-) deformity.  Distal pulses good bilaterally.


NEURO AND PSYCH:  Mental status as above.  Intact sensation bilaterally; normal 

strength in extension of the knees, plantar and dorsiflexion of the toes.  DTRs 

symmetric.











- ECG


O2 Sat by Pulse Oximetry: 96 (RA)


Pulse Ox Interpretation: Normal





Medical Decision Making


Medical Decision Making: 


Time: 16:55





Initial Impression: Back Pain





Plan:


Cyclobenzaprine 10mg PO


Oxycodone/Acetaminophen 1tab PO


Urine culture


Hip/pelvis X-ray


Lumbar spine X-ray 


Urinalysis


Reevaluation 





Time: 17:31


XR Hip/Pelvis : 


FINDINGS:


BONES:


The pelvic ring is intact.  There is no acute displaced fracture or bone 

destruction. Bone alignment is normal.  There is diffuse bone demineralization. 


JOINTS:


Normal. 


SOFT TISSUES:


Normal. 


OTHER FINDINGS:


None.


IMPRESSION:


No acute displaced fracture or dislocation. Please note occult fractures cannot 

be excluded on plain radiographs.  If there is a persistent clinical concern, 

an MRI of the hip may be performed for further evaluation.








XR Lumbar spine : 


FINDINGS:


BONES:


There is diffuse bone demineralization.  There is normal alignment of the 

lumbar vertebral bodies.  There is normal lumbar lordosis. There is age 

indeterminate osteoporotic compression fracture deformity in the L3 vertebral 

body.


DISC SPACES:


There is multilevel degenerative disc disease with anterior spurring, reduced 

disc heights and multilevel facet arthropathy, worse at L5-S1.


OTHER FINDINGS:


None.


IMPRESSION:


Diffuse bone demineralization. Age indeterminate osteoporotic compression 

deformity in the L3 vertebral body.


Multilevel degenerative disc disease, worse at L5-S1.








UA shows (+) UTI, urine cx sent and pending. 


XR results d/w the patient and family. 


Dx of sciatica and UTI d/w the patient and family.


On reevaluation, patient reports improvement of pain. Patient is laying in bed 

comfortably in no acute distress.


Advised to follow up with primary care physician and referral physician 

provided in 1-2 days without fail. Advised to take medication as prescribed and 

continue taking percocet for pain. Return to the emergency room at any time for 

any new or worsening symptoms.


Patient states she fully agrees with and understands discharge instructions. 

States that she agrees with the plan and disposition. Verbalized and repeated 

discharge instructions and plan. I have given the patient opportunity to ask 

any additional questions.











--------------------------------------------------------------------------------

-----------------


Scribe Attestation:


Documented by Aileen Hughes acting as a scribe for ISABEL Hugo PA-C


      


MD Moore Attestation:


All medical record entries made by the Teresa were at my direction and 

personally dictated by me. I have reviewed the chart and agree that the record 

accurately reflects my personal performance of the history, physical exam, 

medical decision making, and the department course for this patient. I have 

also personally directed, reviewed, and agree with the discharge instructions 

and disposition.








Disposition





- Clinical Impression


Clinical Impression: 


 Low back pain, Sciatica, Urinary tract infection








- Patient ED Disposition


Is Patient to be Admitted: No


Counseled Patient/Family Regarding: Studies Performed, Diagnosis, Need For 

Followup, Rx Given





- Disposition


Disposition: Routine/Home


Disposition Time: 18:00


Condition: STABLE


Additional Instructions: 


Thank you for letting us take care of you today. You were treated for low back 

pain, sciatica. The emergency medical care you received today was directed at 

your acute symptoms. If you were prescribed any medication, please fill it and 

take as directed. It may take several days for your symptoms to resolve. Return 

to the Emergency Department if your symptoms worsen, do not improve, or if you 

have any other problems.





Please contact your doctor in 2 days for re-evaluation and follow up / or call 

one of the physicians/clinics you have been referred to that are listed on the 

Patient Visit Information form that is included in your discharge packet. Bring 

any paperwork you were given at discharge with you along with any medications 

you are taking to your follow up visit. Our treatment cannot replace ongoing 

medical care by a primary care provider (PCP) outside of the emergency 

department.





Thank you for allowing the [facility] team to be part of your care today.








Prescriptions: 


Cephalexin [Keflex] 500 mg PO Q6 #28 capsule


Cyclobenzaprine [Cyclobenzaprine HCl] 10 mg PO TID PRN #15 tab


 PRN Reason: Muscle Spasm


Instructions:  Urinary Tract Infections in Adults, Low Back Pain  (DC), 

Sciatica (DC)


Forms:  [employer] (English)


Print Language: [place]anian

## 2018-04-08 NOTE — RAD
PROCEDURE:  Radiographs of the Lumbar Spine.



HISTORY:

Back pain 



COMPARISON:

No prior.



FINDINGS:



BONES:

There is diffuse bone demineralization.  There is normal alignment of 

the lumbar vertebral bodies.  There is normal lumbar lordosis. There 

is age indeterminate osteoporotic compression fracture deformity in 

the L3 vertebral body.



DISC SPACES:

There is multilevel degenerative disc disease with anterior spurring, 

reduced disc heights and multilevel facet arthropathy, worse at L5-S1.



OTHER FINDINGS:

None.



IMPRESSION:

Diffuse bone demineralization. Age indeterminate osteoporotic 

compression deformity in the L3 vertebral body.



Multilevel degenerative disc disease, worse at L5-S1.

## 2018-04-10 ENCOUNTER — HOSPITAL ENCOUNTER (INPATIENT)
Dept: HOSPITAL 14 - H.ER | Age: 63
LOS: 8 days | Discharge: NURSING, REHAB OR LONG-TERM CARE | DRG: 243 | End: 2018-04-18
Attending: HOSPITALIST | Admitting: HOSPITALIST
Payer: MEDICAID

## 2018-04-10 VITALS — BODY MASS INDEX: 39.6 KG/M2

## 2018-04-10 DIAGNOSIS — I48.91: ICD-10-CM

## 2018-04-10 DIAGNOSIS — C90.00: ICD-10-CM

## 2018-04-10 DIAGNOSIS — I48.92: ICD-10-CM

## 2018-04-10 DIAGNOSIS — E87.6: ICD-10-CM

## 2018-04-10 DIAGNOSIS — G89.3: ICD-10-CM

## 2018-04-10 DIAGNOSIS — M48.061: Primary | ICD-10-CM

## 2018-04-10 LAB
ANISOCYTOSIS BLD QL SMEAR: SLIGHT
BASOPHILS # BLD AUTO: 0.1 K/UL (ref 0–0.2)
BASOPHILS NFR BLD: 0.7 % (ref 0–2)
BUN SERPL-MCNC: 9 MG/DL (ref 7–17)
CALCIUM SERPL-MCNC: 9.1 MG/DL (ref 8.4–10.2)
EOSINOPHIL # BLD AUTO: 0 K/UL (ref 0–0.7)
EOSINOPHIL NFR BLD: 0.1 % (ref 0–4)
ERYTHROCYTE [DISTWIDTH] IN BLOOD BY AUTOMATED COUNT: 16.2 % (ref 11.5–14.5)
GFR NON-AFRICAN AMERICAN: > 60
GIANT PLATELETS BLD QL SMEAR: PRESENT
HGB BLD-MCNC: 14.8 G/DL (ref 12–16)
LG PLATELETS BLD QL SMEAR: PRESENT
LYMPHOCYTE: 4 % (ref 20–50)
LYMPHOCYTES # BLD AUTO: 0.7 K/UL (ref 1–4.3)
LYMPHOCYTES NFR BLD AUTO: 3.6 % (ref 20–40)
MCH RBC QN AUTO: 30.2 PG (ref 27–31)
MCHC RBC AUTO-ENTMCNC: 33.9 G/DL (ref 33–37)
MCV RBC AUTO: 89.2 FL (ref 81–99)
MONOCYTE: 8 % (ref 0–10)
MONOCYTES # BLD: 1.3 K/UL (ref 0–0.8)
MONOCYTES NFR BLD: 7.3 % (ref 0–10)
NEUTROPHILS # BLD: 16 K/UL (ref 1.8–7)
NEUTROPHILS NFR BLD AUTO: 85 % (ref 42–75)
NEUTROPHILS NFR BLD AUTO: 88.3 % (ref 50–75)
NEUTS BAND NFR BLD: 3 % (ref 0–2)
NRBC BLD AUTO-RTO: 0 % (ref 0–0)
PLATELET # BLD EST: NORMAL 10*3/UL
PLATELET # BLD: 213 K/UL (ref 130–400)
PMV BLD AUTO: 9.4 FL (ref 7.2–11.7)
RBC # BLD AUTO: 4.91 MIL/UL (ref 3.8–5.2)
TOTAL CELLS COUNTED BLD: 100
URATE SERPL-MCNC: 2.1 MG/DL (ref 2.2–7.5)
WBC # BLD AUTO: 18.1 K/UL (ref 4.8–10.8)

## 2018-04-10 NOTE — CP.PCM.HP
History of Present Illness





- History of Present Illness


History of Present Illness: 





CC: Severe back pain





This is a 62 year old female with a past medical history significant for atrial 

fibrillation anticoagulated on Xarelto, essential hypertension, Multiple myeloma

, and CHF, who presented to the ED complaining of worsening back pain. The 

patient has chronic back pain due to the multiple myeloma, however today the 

pain became much worse. In addition the patient was found to have urinary 

retention since this morning, prompting her to come to the ED. In the ED, the 

patient was found to be hemodynamically stable although in severe pain not 

relieved by Percocets. Sharma catheter was placed and drained large amount of 

urine. Multiple attempts in the ED were made for MRI to assess for spinal cord 

compression (possibly due to malignancy), however the patient was unable to lie 

flat in MRI due to pain. She is for another attempt at MRI with Iv morphine and 

we will attempt again. She is also found to have afib with RVR, likely 

secondary to pain and anxiety. Patient has no motor or sensory deficits, no 

fecal or urinary incontinence, and no saddle anesthesia. She denies chest pain, 

sob, nausea, vomiting, diarrhea, or headache at this time. 





Present on Admission





- Present on Admission


Any Indicators Present on Admission: No





Review of Systems





- Review of Systems


Review of Systems: 





A 12 point review of systems was conducted and found to be negative other than 

what was mentioned in the HPI.





Past Patient History





- Infectious Disease


Hx of Infectious Diseases: None





- Tetanus Immunizations


Tetanus Immunization: Unknown





- Past Medical History & Family History


Past Medical History?: Yes





- Past Social History


Smoking Status: Never Smoked





- CARDIAC


Hx Atrial Fibrillation: Yes


Hx Cardia Arrhythmia: Yes


Hx Congestive Heart Failure: Yes


Hx Hypertension: Yes





- PULMONARY


Hx Pneumonia: Yes





- NEUROLOGICAL


Hx Neurological Disorder: No





- HEENT


Hx HEENT Problems: No





- RENAL


Hx Chronic Kidney Disease: No





- ENDOCRINE/METABOLIC


Hx Endocrine Disorders: No





- HEMATOLOGICAL/ONCOLOGICAL


Hx Human Immunodeficiency Virus (HIV): No





- INTEGUMENTARY


Hx Dermatological Problems: No





- MUSCULOSKELETAL/RHEUMATOLOGICAL


Hx Fractures: Yes (rib)





- GASTROINTESTINAL


Hx Gastrointestinal Disorders: No





- GENITOURINARY/GYNECOLOGICAL


Other/Comment: Uterine Fibroids





- PSYCHIATRIC


Hx Psychophysiologic Disorder: No


Hx Substance Use: No





- SURGICAL HISTORY


Hx Appendectomy: Yes


Hx Cholecystectomy: Yes





- ANESTHESIA


Hx Anesthesia: Yes


Hx Anesthesia Reactions: No


Hx Malignant Hyperthermia: No





Meds


Allergies/Adverse Reactions: 


 Allergies











Allergy/AdvReac Type Severity Reaction Status Date / Time


 


No Known Allergies Allergy   Verified 04/08/18 15:46














Physical Exam





- Additional Findings


Additional findings: 








Physical exam:





Constitutional- cooperative, awake, alert,


Head- NCAT, PERRL


Eye- PERRL, EOMI


ENT- normal exam, MMM.


Neck- normal inspection, supple, no JVD


Respiratory- CTAB, no wheezes rales rhonchi


Cardiovascular- RRR, +S1, +S2 no MRG


GI/Abdominal- obese, normal bowel sounds, soft, no mass, no hsm


Skin- warm, dry


Extremities Exam- normal capillary refill, normal inspection


Neurological Exam- alert, awake, oriented, no motor or sensory deficits, CN II-

XII intact


Psych- normal affect appears anxious





Results





- Vital Signs


Recent Vital Signs: 





 Last Vital Signs











Temp  98.1 F   04/10/18 14:34


 


Pulse  110 H  04/10/18 18:19


 


Resp  18   04/10/18 18:19


 


BP  115/51 L  04/10/18 18:19


 


Pulse Ox  96   04/10/18 18:19














- Labs


Result Diagrams: 


 04/10/18 12:05





 04/10/18 12:05


Labs: 





 Laboratory Results - last 24 hr











  04/10/18 04/10/18 04/10/18





  12:05 12:05 14:21


 


WBC   18.1 H D 


 


RBC   4.91 


 


Hgb   14.8  D 


 


Hct   43.8 


 


MCV   89.2 


 


MCH   30.2 


 


MCHC   33.9 


 


RDW   16.2 H 


 


Plt Count   213 


 


MPV   9.4 


 


Neut % (Auto)   88.3 H 


 


Lymph % (Auto)   3.6 L 


 


Mono % (Auto)   7.3 


 


Eos % (Auto)   0.1 


 


Baso % (Auto)   0.7 


 


Neut # (Auto)   16.0 H 


 


Lymph # (Auto)   0.7 L 


 


Mono # (Auto)   1.3 H 


 


Eos # (Auto)   0.0 


 


Baso # (Auto)   0.1 


 


Neutrophils % (Manual)   85 H 


 


Band Neutrophils %   3 H 


 


Lymphocytes % (Manual)   4 L 


 


Monocytes % (Manual)   8 


 


Platelet Estimate   Normal 


 


Large Platelets   Present 


 


Giant Platelets   Present 


 


Anisocytosis (manual)   Slight 


 


Sodium  134  


 


Potassium  3.2 L  


 


Chloride  95 L  


 


Carbon Dioxide  25  


 


Anion Gap  17  


 


BUN  9  


 


Creatinine  0.5 L  


 


Est GFR ( Amer)  > 60  


 


Est GFR (Non-Af Amer)  > 60  


 


Random Glucose  162 H  


 


Uric Acid  2.1 L  


 


Calcium  9.1  


 


NT-Pro-B Natriuret Pep    898


 


Digoxin   














  04/10/18





  14:21


 


WBC 


 


RBC 


 


Hgb 


 


Hct 


 


MCV 


 


MCH 


 


MCHC 


 


RDW 


 


Plt Count 


 


MPV 


 


Neut % (Auto) 


 


Lymph % (Auto) 


 


Mono % (Auto) 


 


Eos % (Auto) 


 


Baso % (Auto) 


 


Neut # (Auto) 


 


Lymph # (Auto) 


 


Mono # (Auto) 


 


Eos # (Auto) 


 


Baso # (Auto) 


 


Neutrophils % (Manual) 


 


Band Neutrophils % 


 


Lymphocytes % (Manual) 


 


Monocytes % (Manual) 


 


Platelet Estimate 


 


Large Platelets 


 


Giant Platelets 


 


Anisocytosis (manual) 


 


Sodium 


 


Potassium 


 


Chloride 


 


Carbon Dioxide 


 


Anion Gap 


 


BUN 


 


Creatinine 


 


Est GFR ( Amer) 


 


Est GFR (Non-Af Amer) 


 


Random Glucose 


 


Uric Acid 


 


Calcium 


 


NT-Pro-B Natriuret Pep 


 


Digoxin  < 0.4 L














Assessment & Plan





- Assessment and Plan (Free Text)


Plan: 





ASSESSMENT/PLAN





This is a 62 year old female with a past medical history significant for atrial 

fibrillation anticoagulated on Xarelto, essential hypertension, Multiple myeloma

, and CHF, who presented to the ED complaining of worsening back pain, now with 

concern for cord compression as there is coexisting new onset urinary retention





1) Severe acute on chronic back pain with urinary retention, r/o spinal cord 

compression


- Admit to telemetry


- Consultation with Dr. Sorto for anesthesia- we will attempt MRI at first 

with IV morphine on board, if patient still unable to go for MRI due to pain we 

will intubate to obtain the MRI. 


- Decadron 8 mg IVP q 8 hours 


- Stat CXR





2) Afib with RVR


- Lopressor 5 mg IV once to reduce HR


- Likely due to anxiety and pain


- Iv morphine


- Restart home medications including Xarelto after the MRI and diet is advanced





3) Hypokalemia, mild


- Repleted in ED


- Recheck in AM





4) Essential htn


- chronic


- holding all bp meds for now


- lopressor IV


- will restart when patient is able to take po





5) Multiple myeloma


- chronic


- Dr. ASHER Otto on consultation for heme/onc





6) Leukocytosis


- secondary to Decadron


- no evidence of infection





7) DVT prophylaxis


- will restart Xarelto

## 2018-04-10 NOTE — ED PDOC
HPI: Back


Time Seen by Provider: 04/10/18 10:23


Chief Complaint (Nursing): Back Pain


Chief Complaint (Provider): Back Pain


History Per: Patient


History/Exam Limitations: no limitations


Onset/Duration Of Symptoms: Days (x3)


Current Symptoms Are (Timing): Still Present


Quality Of Discomfort: "Pain"


Associated Symptoms: Other (urinary retention)


Exacerbating Factor(s): Standing


Additional Complaint(s): 





Laura Montanez is a 62 year old female, with a past medical history of HTN, and 

multiple myeloma, who presents to the emergency department complaining of back 

pain onset x3 days ago and difficulty urination since last night. Patient 

reports the lower back pain does not radiate to legs and pain is worst when she 

gets up and walks. Patient states she had urinary retention and was not able to 

urinate since last night. She was diagnosed with UTI last week and started on 

Keflex. According to Dr. ASHER Otto, oncologist, and patient, she has gone through 

chemotherapy and radiation for multiple myeloma. She has no metastasis in her 

spine. She reports improvement of pain after radiation. However, patient is 

still on chemo at home, through tablets. Patient had it for many years, but 

over the weekend she started to have back pain. Doctor knows she has a disc 

herniation. Patient states she is not in a lot of pain but eventually called 

Dr. AHSER Otto who instructed her to come to the ER. She denies any new weakness 

or numbness. No further medical complaints.





PMD: Tong Pickering





Past Medical History


Reviewed: Historical Data, Nursing Documentation, Vital Signs


Vital Signs: 


 Last Vital Signs











Temp  98.1 F   04/10/18 09:34


 


Pulse  114 H  04/10/18 09:34


 


Resp  21   04/10/18 09:34


 


BP  117/69   04/10/18 09:34


 


Pulse Ox  96   04/10/18 09:34














- Medical History


PMH: Atrial Fibrillation, Back Problems, Cardia Arrhythmia, CHF, Fractures (rib)

, HTN, Pneumonia


   Denies: HIV, Chronic Kidney Disease


Other PMH: Multiple Myeloma





- Surgical History


Surgical History: Appendectomy, Cholecystectomy


Other surgeries: hysterectomy





- Family History


Family History: States: Unknown Family Hx





- Home Medications


Home Medications: 


 Ambulatory Orders











 Medication  Instructions  Recorded


 


Rivaroxaban [Xarelto] 20 mg PO DAILY 02/28/17


 


Lenalidomide [Revlimid] 15 mg PO DAILY 05/23/17


 


Potassium Chloride [K-Dur 20 mEq 20 meq PO BID 08/15/17





ER Tab]  


 


Dexamethasone [Decadron] 10 mg PO BID 03/05/18


 


Albuterol HFA [Ventolin HFA 90 2 puff IH Q4 PRN 04/02/18





mcg/actuation (8 g)]  


 


Allopurinol [Zyloprim] 100 mg PO DAILY 04/02/18


 


Aspirin [Ecotrin] 81 mg PO DAILY 04/02/18


 


Carvedilol [Coreg] 25 mg PO Q12 04/02/18


 


Digoxin [Digitek] 125 mcg PO DAILY 04/02/18


 


Famotidine [Pepcid] 20 mg PO DAILY 04/02/18


 


Lisinopril [Zestril] 10 mg PO DAILY 04/02/18


 


oxyCODONE/Acetaminophen [Percocet 1 tab PO Q6 PRN 04/02/18





5/325 mg Tab]  


 


Furosemide [Lasix] 20 mg PO BID #60 04/04/18


 


guaiFENesin/Dextromethorphan 10 ml PO Q4 PRN #200 ml 04/04/18





[Robitussin DM]  














- Allergies


Allergies/Adverse Reactions: 


 Allergies











Allergy/AdvReac Type Severity Reaction Status Date / Time


 


No Known Allergies Allergy   Verified 04/08/18 15:46














Review of Systems


ROS Statement: Except As Marked, All Systems Reviewed And Found Negative


Constitutional: Negative for: Fever


Genitourinary Female: Positive for: Other (urinary retention).  Negative for: 

Incontinence


Musculoskeletal: Positive for: Back Pain


Neurological: Negative for: Weakness (legs and perineal area), Numbness (legs 

and perineal area)





Physical Exam





- Reviewed


Nursing Documentation Reviewed: Yes


Vital Signs Reviewed: Yes





- Physical Exam


Appears: Positive for: Non-toxic, Uncomfortable


Head Exam: Positive for: ATRAUMATIC, NORMAL INSPECTION, NORMOCEPHALIC


Skin: Positive for: Normal Color, Warm, Dry


Eye Exam: Positive for: Normal appearance, EOMI, PERRL


Neck: Positive for: Painless ROM, Supple


Cardiovascular/Chest: Positive for: Regular Rate, Rhythm.  Negative for: Murmur


Respiratory: Positive for: Normal Breath Sounds.  Negative for: Respiratory 

Distress


Gastrointestinal/Abdominal: Positive for: Normal Exam, Soft.  Negative for: 

Tenderness, Guarding, Rebound


Back: Positive for: Normal Inspection, Vertebral Tenderness (lower back 

diffusely)


Extremity: Positive for: Normal ROM (upper and lower extremities), Other (Full 

strength in legs and arms).  Negative for: Tenderness, Deformity, Swelling


Neurologic/Psych: Positive for: Alert, Oriented, Gait (unable to test due to 

pain).  Negative for: CNs II-XII (no deficits), Motor/Sensory Deficits (in 

lower extremities)





- Laboratory Results


Result Diagrams: 


 04/11/18 04:25





 04/11/18 04:25





- ECG


O2 Sat by Pulse Oximetry: 96 (RA)


Pulse Ox Interpretation: Normal





Medical Decision Making


Medical Decision Making: 





Initial Impression: Back pain with history of meds and multiple myeloma of the 

spine as well as urinary retention. Differential includes MM lesions, equina, 

disc herniation, lumbar radiculopathy, metastatic cancer, less likely cord 

compression/cauda equina


Additional dx Afib with RVR. Urinary retention. Possible UTI





Initial Plan:





--BMP


--Uric Acid


--Urine dipstick


--CBC w/ differential


--Lumbar Spine MRI w/wo Gadolinium [MRI]


--Thoracic Spine w/wo Gadolinium [MRI]


--Percocet 5/325 mg tab 1 tab PO


--Percocet 5/325 mg tab 1 tab PO


--Reevaluation











-Consult has been done with Dr. ASHER Otto who recommended an MRI, with contrast 

of the spine and to hold on Decadron for now. 





-1600 Patient went to MRI x2 unsuccessfully because she couldn't lay still in 

MRI. Patient refuses stronger opiods at this time. Will request anesthesia 

consult. 





Time: 1623


-Spoke with Dr. ASHER Otto who wants to admit patient to hospitalist. She agrees 

with the plan to obtain MRI with anesthesia. Advises 8 mg of Decadron Q8.


-Potassium PO





-1630  Discussed case with Dr. Middleton, hospitalist, who admitted patient for 

telemetry for intractable pain. 





-1700 Discussed with anesthesiologist, Dr. Mckeon for possible sedation and 

pain control. He will provide sedation for MRI. If no contraindications, 

patient will be intubated. 





-1705 Discussed with MRI tech to expedite. 





-There is no evidence of cord compression based on neuro exam although 

suspicion still remains due to urinary retention. There is no indicatrions for 

STAT neurosurgical consult. Hemonc consulted. There is no evidence of spinal 

infection until imaging is performed. Discussed with Dr Middleton to follow up on 

MRI and request appropriate  consults. Started IV steroids. MRI will be 

performed with anesthesia STAT.





--------------------------------------------------------------------------------

-----------------


Scribe Attestation:


Documented by Hai Purcell, acting as a scribe for Alee Ching PA-C





Provider Scribe Attestation:


All medical record entries made by the Scribe were at my direction and 

personally dictated by me. I have reviewed the chart and agree that the record 

accurately reflects my personal performance of the history, physical exam, 

medical decision making, and the department course for this patient. I have 

also personally directed, reviewed, and agree with the discharge instructions 

and disposition.





Disposition





- Clinical Impression


Clinical Impression: 


 Back pain, Atrial fibrillation with rapid ventricular response, Hypokalemia 

due to loss of potassium, Myeloma, Urinary retention








- Patient ED Disposition


Is Patient to be Admitted: Yes


Discussed With : Miguelangel Middleton


Doctor Will See Patient In The: ED


Counseled Patient/Family Regarding: Studies Performed, Diagnosis





- Disposition


Disposition Time: 17:00


Condition: FAIR





- Pt Status Changed To:


Hospital Disposition Of: Inpatient





- Admit Certification


Admit to Inpatient:: After my assessment, the patient will require 

hospitalization for at least two midnights.  This is because of the severity of 

symptoms shown, intensity of services needed, and/or the medical risk in this 

patient being treated as an outpatient.





- POA


Present On Arrival: Poor Glycemic Control

## 2018-04-11 LAB
BILIRUB UR-MCNC: NEGATIVE MG/DL
BUN SERPL-MCNC: 13 MG/DL (ref 7–17)
CALCIUM SERPL-MCNC: 8.7 MG/DL (ref 8.4–10.2)
COLOR UR: YELLOW
ERYTHROCYTE [DISTWIDTH] IN BLOOD BY AUTOMATED COUNT: 16.2 % (ref 11.5–14.5)
GFR NON-AFRICAN AMERICAN: > 60
GLUCOSE UR STRIP-MCNC: 150 MG/DL
HGB BLD-MCNC: 13 G/DL (ref 12–16)
LEUKOCYTE ESTERASE UR-ACNC: (no result) LEU/UL
MCH RBC QN AUTO: 30.2 PG (ref 27–31)
MCHC RBC AUTO-ENTMCNC: 33.8 G/DL (ref 33–37)
MCV RBC AUTO: 89.3 FL (ref 81–99)
PH UR STRIP: 6 [PH] (ref 5–8)
PLATELET # BLD: 212 K/UL (ref 130–400)
PROT UR STRIP-MCNC: 100 MG/DL
RBC # BLD AUTO: 4.31 MIL/UL (ref 3.8–5.2)
RBC # UR STRIP: (no result) /UL
SP GR UR STRIP: 1.03 (ref 1–1.03)
SQUAMOUS EPITHIAL: < 1 /HPF (ref 0–5)
URINE CLARITY: (no result)
UROBILINOGEN UR-MCNC: (no result) MG/DL (ref 0.2–1)
WBC # BLD AUTO: 15.4 K/UL (ref 4.8–10.8)

## 2018-04-11 RX ADMIN — DEXAMETHASONE SODIUM PHOSPHATE SCH MG: 4 INJECTION, SOLUTION INTRAMUSCULAR; INTRAVENOUS at 09:24

## 2018-04-11 RX ADMIN — WATER SCH MLS/HR: 1 INJECTION INTRAMUSCULAR; INTRAVENOUS; SUBCUTANEOUS at 03:13

## 2018-04-11 RX ADMIN — WATER SCH MLS/HR: 1 INJECTION INTRAMUSCULAR; INTRAVENOUS; SUBCUTANEOUS at 09:25

## 2018-04-11 RX ADMIN — DIGOXIN SCH MG: 0.12 TABLET ORAL at 14:10

## 2018-04-11 RX ADMIN — GUAIFENESIN AND DEXTROMETHORPHAN PRN ML: 100; 10 SYRUP ORAL at 14:11

## 2018-04-11 RX ADMIN — GUAIFENESIN AND DEXTROMETHORPHAN PRN ML: 100; 10 SYRUP ORAL at 21:30

## 2018-04-11 RX ADMIN — POTASSIUM CHLORIDE SCH MEQ: 20 TABLET, EXTENDED RELEASE ORAL at 17:22

## 2018-04-11 RX ADMIN — DEXAMETHASONE SODIUM PHOSPHATE SCH MG: 4 INJECTION, SOLUTION INTRAMUSCULAR; INTRAVENOUS at 17:16

## 2018-04-11 RX ADMIN — DEXAMETHASONE SODIUM PHOSPHATE SCH MG: 4 INJECTION, SOLUTION INTRAMUSCULAR; INTRAVENOUS at 02:06

## 2018-04-11 NOTE — CP.PCM.PN
Subjective





- Date & Time of Evaluation


Date of Evaluation: 04/11/18


Time of Evaluation: 00:08





- Subjective


Subjective: 





EXAM:


MR Lumbar Spine Without and With Intravenous Contrast


EXAM DATE/TIME:


Exam ordered 4/10/2018 11:33 AM





FINDINGS:


Vertebrae: Loss of height of the L3 vertebral body appears to be new since the 

prior MRI however


lack of edema would suggest that this is a chronic abnormality. Prior study is 

2014. History of


multiple myeloma. The L1 vertebral body demonstrates mild loss of height also 

to be chronic. There


is diffuse extramedullary, intradural diffuse pachymeningeal thickening is 

identified extending


throughout the lumbar spine, which may be reflective of neoplasm or infection 

most likely.


Hypertrophic change anteriorly in the lumbar spine diffusely.


Mild lumbar scoliosis. No acute fracture.


Spinal cord: Unremarkable. Normal signal. No abnormal enhancement.


Soft tissues: Unremarkable.


Kidneys and ureters: Possible cysts in the right kidney not further 

characterized.


DISCS/SPINAL CANAL/NEURAL FORAMINA:


T12-L1: At T12-L1 disc bulge is noted with central stenosis with mass effect by 

the intradural


extramedullary process posteriorly. Similar findings are noted at L1-L2, L2-L3, 

and L3-L4 with


degenerative disc disease and facet arthritis contributing to central stenosis. 

A left paracentral disc


herniation is noted at L3-L4. Extruded disc material is noted to extend to just 

below the L2-L3 level.


L4-L5: At L4-L5 and L5-S1, there is facet arthritis with foraminal narrowing.





IMPRESSION:


1. Loss of height of the L3 vertebral body appears to be new since the prior 

MRI however lack of


edema would suggest that this is a chronic abnormality. Prior study is 2014. 

History of multiple


myeloma.


2. The L1 vertebral body demonstrates mild loss of height also to be chronic.


3. There is diffuse extramedullary, intradural pachymeningeal diffuse 

thickening identified extending


throughout the lumbar spine, which may be reflective of neoplasm or infection 

most likely. Reported


history of malignancy.


4. At T12-L1 disc bulge is noted with central stenosis with mass effect by the 

intradural


extramedullary process posteriorly. Similar findings are noted at L1-L2, L2-L3, 

and L3-L4 with


degenerative disc disease and facet arthritis contributing to central stenosis. 

A left paracentral disc


herniation is noted at L3-L4. Extruded disc material is noted to extend to just 

below the L2-L3 level.














EXAM:


MR Thoracic Spine Without and With Intravenous Contrast


EXAM DATE/TIME:


Exam ordered 4/10/2018 11:33 AM





FINDINGS:


Vertebrae: Clinical history includes malignancy with a neck mass and multiple 

myeloma. There are


multiple compression deformities identified in the thoracic spine. These appear 

to be similar to the


prior study aside from further compression and flattening of T6. Lack of edema 

at this site would


make this likely a chronic progression. History includes multiple myeloma 

presumably related to


osteopenia related compression fractures. Hypertrophic change anteriorly in the 

thoracic spine.


Other bones/joints: Rib fractures are noted consistent with stated history of 

multiple myeloma.


Discs/spinal canal/neural foramina: There are multiple bulging discs in the mid 

and lower thoracic


spine. There appears to be diffuse pachymeningeal thickening in a extra 

medullary intradural location


throughout the thoracic spine posteriorly predominantly, similar to lumbar 

spine findings, which


causes multilevel stenosis of the thoracic canal in combination with the 

compression fractures and


degenerative disc disease. As before infection or neoplasm should be 

considered. History of a


malignancy is noted.


Lungs: There is abnormal enhancement in the lung parenchyma medially 

bilaterally which may be


reflective of infection or possibly neoplastic process as well.





IMPRESSION:


1. Clinical history includes malignancy with a neck mass and multiple myeloma.


2. There are multiple compression deformities identified in the thoracic spine. 

These appear to be


similar to the prior study aside from further compression and flattening of T6. 

Lack of edema at this


site would make this likely a chronic progression. History includes multiple 

myeloma presumably


related to osteopenia related compression fractures.


3. There are multiple bulging discs in the mid and lower thoracic spine. There 

appears to be diffuse


pachymeningeal thickening in a extra medullary intradural location throughout 

the thoracic spine


posteriorly predominantly, similar to lumbar spine findings, which causes 

multilevel stenosis of the


thoracic canal in combination with the compression fractures and degenerative 

disc disease. As


before infection or neoplasm should be considered. History of a malignancy is 

noted.


4. Rib fractures are noted consistent with stated history of multiple myeloma.


5. There is abnormal enhancement in the lung parenchyma medially bilaterally 

which may be


reflective of infection or possibly neoplastic process as well.








Objective





- Vital Signs/Intake and Output


Vital Signs (last 24 hours): 


 











Temp Pulse Resp BP Pulse Ox


 


 100.2 F H  113 H  18   124/86   94 L


 


 04/10/18 23:00  04/10/18 23:00  04/10/18 23:00  04/10/18 23:00  04/10/18 23:00











- Medications


Medications: 


 Current Medications





Dexamethasone (Decadron Inj)  8 mg IVP Q8H THAIS


Lactated Ringer's (Lactated Ringer's)  1,000 mls @ 75 mls/hr IV .[id number] THAIS


Morphine Sulfate (Morphine)  2 mg IVP Q4 PRN


   PRN Reason: Pain, moderate (4-7)


Morphine Sulfate (Morphine)  5 mg IVP Q6H PRN


   PRN Reason: Pain, severe (8-10)


Morphine Sulfate (Morphine)  2 mg IVP Q10M PRN


   PRN Reason: Pain, moderate (4-7)


   Stop: 04/11/18 00:36


Prochlorperazine (Compazine)  10 mg IVP Q8H PRN


   PRN Reason: Nausea/Vomiting











- Labs


Labs: 


 





 04/10/18 12:05 





 04/10/18 12:05

## 2018-04-11 NOTE — CP.PCM.CON
History of Present Illness





- History of Present Illness


History of Present Illness: 





Infectious Disease Consultation Note-





 asked to see this patient at the request of the hospitalist for fever , lower 

back pian.





HPI-


Patient is a 62 year old female with PMH of Multiple myeloma s/p radiation and 

undergoing chemotherpay, HTN, A,fib On xarelto who was admitetd with c/o severe 

lower back pain and urinary retention for past day.


pt. states she has chronic back pain from her MM but apparently it worsened in 

the past few days and also yesterday she had trouble urinating adn hence came 

to ED to be further evaluated and treated.


pt. had block placed in ED and large amount of urine came out.


pt. had MRI of the lumabr spine to r/o cord compression and as per radiologist'

s report was found to have thickening of the spinal cord.


I'm asked to see the patient to rule out spinal infection since pt. also has 

fever and slight elevation of wbc.





 as per oncologist' note pt. has had skeletal mets and also has had recent 

admission for CHF 


and is receiving zometa every 4 weeks along with revlimid for maintenance 

therapy.





Patient currently is not in any acute distress except back pian.


denies any loss of sensation or any weakness in her legs , denies any loss of 

bowel or bladder.


denies any diarrhea.


states has  been having cough for few weeks.


denies any sob.


no abdominal pain, denies any vomiting.


pt. denies any h/o spinal surgery and denies any epidural injections in the 

past.











Review of Systems





- Review of Systems


Review of Systems: 





ROS-


denies any fever or chills but was found to have fever in ED, + cough but no 

phlegm, denies any sob, denies anyc hest pain, denies any nausea or vomiting 

denies any abd. pain, had urinary retention but no dysurea, denies any diarrhea


denies any loss of bowel control.


denies any weakness in the legs .


has chronic back pain secondary to her MM and mets but worse in past few days.


has been getting radiation and  chemo





Past Patient History





- Infectious Disease


Hx of Infectious Diseases: None





- Tetanus Immunizations


Tetanus Immunization: Unknown





- Past Medical History & Family History


Past Medical History?: Yes





- Past Social History


Smoking Status: Never Smoked


Alcohol: None


Drugs: Denies


Home Situation {Lives}: With Family





- CARDIAC


Hx Cardiac Disorders: Yes


Hx Atrial Fibrillation: Yes


Hx Cardia Arrhythmia: Yes


Hx Congestive Heart Failure: Yes


Hx Hypertension: Yes





- PULMONARY


Hx Respiratory Disorders: Yes


Hx Pneumonia: Yes





- NEUROLOGICAL


Hx Neurological Disorder: No





- HEENT


Hx HEENT Problems: No





- RENAL


Hx Chronic Kidney Disease: No





- ENDOCRINE/METABOLIC


Hx Endocrine Disorders: No





- HEMATOLOGICAL/ONCOLOGICAL


Hx Blood Disorders: No





- INTEGUMENTARY


Hx Dermatological Problems: No





- MUSCULOSKELETAL/RHEUMATOLOGICAL


Hx Musculoskeletal Disorders: Yes


Hx Back Pain: Yes


Hx Falls: No





- GASTROINTESTINAL


Hx Gastrointestinal Disorders: No





- GENITOURINARY/GYNECOLOGICAL


Hx Genitourinary Disorders: Yes


Hx Urinary Tract Infection: Yes


Other/Comment: Uterine Fibroids





- PSYCHIATRIC


Hx Psychophysiologic Disorder: No


Hx Substance Use: No





- SURGICAL HISTORY


Hx Surgeries: Yes


Hx Appendectomy: Yes


Hx Cholecystectomy: Yes


Hx Hysterectomy: Yes





- ANESTHESIA


Hx Anesthesia: Yes


Hx Anesthesia Reactions: No


Hx Malignant Hyperthermia: No





Meds


Allergies/Adverse Reactions: 


 Allergies











Allergy/AdvReac Type Severity Reaction Status Date / Time


 


No Known Allergies Allergy   Verified 04/08/18 15:46














- Medications


Medications: 


 Current Medications





Dexamethasone (Decadron Inj)  8 mg IVP Q8H [facility]


   Last Admin: 04/11/18 09:24 Dose:  8 mg


Lactated Ringer's (Lactated Ringer's)  1,000 mls @ 75 mls/hr IV .[id number] [facility]


   Last Admin: 04/11/18 02:08 Dose:  75 mls/hr


Piperacillin Sod/Tazobactam (Sod 3.375 gm/ Sodium Chloride)  100 mls @ 100 mls/

hr IVPB Q6 [facility]


   PRN Reason: Protocol


   Last Admin: 04/11/18 09:25 Dose:  100 mls/hr


Vancomycin HCl 1 gm/ Sodium (Chloride)  250 mls @ 166.667 mls/hr IVPB Q12H [facility]


   PRN Reason: Protocol


   Last Admin: 04/11/18 02:15 Dose:  166.667 mls/hr


Dextrose/Sodium Chloride (Dextrose 5%/0.9% Ns 1000 Ml)  1,000 mls @ 70 mls/hr 

IV .[id number] [facility]


   Stop: 04/12/18 10:39


Morphine Sulfate (Morphine)  2 mg IVP Q4 PRN


   PRN Reason: Pain, moderate (4-7)


   Last Admin: 04/11/18 02:10 Dose:  2 mg


Morphine Sulfate (Morphine)  5 mg IVP Q6H PRN


   PRN Reason: Pain, severe (8-10)


Pregabalin (Lyrica)  75 mg PO BID [facility]


Prochlorperazine (Compazine)  10 mg IVP Q8H PRN


   PRN Reason: Nausea/Vomiting











Physical Exam





- Constitutional


Appears: No Acute Distress





- Head Exam


Head Exam: ATRAUMATIC





- Eye Exam


Eye Exam: EOMI, PERRL





- ENT Exam


ENT Exam: Normal Oropharynx





- Neck Exam


Neck exam: Positive for: Full Rom





- Respiratory Exam


Respiratory Exam: Clear to Auscultation Bilateral, NORMAL BREATHING PATTERN





- Cardiovascular Exam


Cardiovascular Exam: Irregular Rhythm


Additional comments: 





Irregularly Irregular


rate controlled





- GI/Abdominal Exam


GI & Abdominal Exam: Normal Bowel Sounds, Soft


Additional comments: 





NT, ND





- Extremities Exam


Extremities exam: Positive for: normal inspection


Additional comments: 





no edema B/L LE





- Back Exam


Back exam: tenderness


Additional comments: 





positive tenderenss in mid lower lumbar and thoracic region


no lesions


no erythema





- Neurological Exam


Neurological exam: Alert, Oriented x3





Results





- Vital Signs


Recent Vital Signs: 


 Last Vital Signs











Temp  98.7 F   04/11/18 07:53


 


Pulse  93 H  04/11/18 07:53


 


Resp  18   04/11/18 07:53


 


BP  144/83   04/11/18 07:53


 


Pulse Ox  97   04/11/18 07:53














- Labs


Result Diagrams: 


 04/11/18 04:25





 04/11/18 04:25


Labs: 


 Laboratory Results - last 24 hr











  04/10/18 04/10/18 04/10/18





  12:05 12:05 14:21


 


WBC   18.1 H D 


 


RBC   4.91 


 


Hgb   14.8  D 


 


Hct   43.8 


 


MCV   89.2 


 


MCH   30.2 


 


MCHC   33.9 


 


RDW   16.2 H 


 


Plt Count   213 


 


MPV   9.4 


 


Neut % (Auto)   88.3 H 


 


Lymph % (Auto)   3.6 L 


 


Mono % (Auto)   7.3 


 


Eos % (Auto)   0.1 


 


Baso % (Auto)   0.7 


 


Neut # (Auto)   16.0 H 


 


Lymph # (Auto)   0.7 L 


 


Mono # (Auto)   1.3 H 


 


Eos # (Auto)   0.0 


 


Baso # (Auto)   0.1 


 


Neutrophils % (Manual)   85 H 


 


Band Neutrophils %   3 H 


 


Lymphocytes % (Manual)   4 L 


 


Monocytes % (Manual)   8 


 


Platelet Estimate   Normal 


 


Large Platelets   Present 


 


Giant Platelets   Present 


 


Anisocytosis (manual)   Slight 


 


Sodium  134  


 


Potassium  3.2 L  


 


Chloride  95 L  


 


Carbon Dioxide  25  


 


Anion Gap  17  


 


BUN  9  


 


Creatinine  0.5 L  


 


Est GFR ( Amer)  > 60  


 


Est GFR (Non-Af Amer)  > 60  


 


Random Glucose  162 H  


 


Uric Acid  2.1 L  


 


Calcium  9.1  


 


NT-Pro-B Natriuret Pep    898


 


Digoxin   














  04/10/18 04/11/18 04/11/18





  14:21 04:25 04:25


 


WBC   15.4 H 


 


RBC   4.31 


 


Hgb   13.0 


 


Hct   38.5 


 


MCV   89.3 


 


MCH   30.2 


 


MCHC   33.8 


 


RDW   16.2 H 


 


Plt Count   212 


 


MPV   


 


Neut % (Auto)   


 


Lymph % (Auto)   


 


Mono % (Auto)   


 


Eos % (Auto)   


 


Baso % (Auto)   


 


Neut # (Auto)   


 


Lymph # (Auto)   


 


Mono # (Auto)   


 


Eos # (Auto)   


 


Baso # (Auto)   


 


Neutrophils % (Manual)   


 


Band Neutrophils %   


 


Lymphocytes % (Manual)   


 


Monocytes % (Manual)   


 


Platelet Estimate   


 


Large Platelets   


 


Giant Platelets   


 


Anisocytosis (manual)   


 


Sodium    141


 


Potassium    3.4 L


 


Chloride    97 L


 


Carbon Dioxide    28


 


Anion Gap    19


 


BUN    13


 


Creatinine    0.4 L


 


Est GFR ( Amer)    > 60


 


Est GFR (Non-Af Amer)    > 60


 


Random Glucose    173 H


 


Uric Acid   


 


Calcium    8.7


 


NT-Pro-B Natriuret Pep   


 


Digoxin  < 0.4 L  








 Laboratory Results - last 72 hr











  04/10/18 04/10/18 04/10/18





  12:05 12:05 14:21


 


WBC   18.1 H D 


 


RBC   4.91 


 


Hgb   14.8  D 


 


Hct   43.8 


 


MCV   89.2 


 


MCH   30.2 


 


MCHC   33.9 


 


RDW   16.2 H 


 


Plt Count   213 


 


MPV   9.4 


 


Neut % (Auto)   88.3 H 


 


Lymph % (Auto)   3.6 L 


 


Mono % (Auto)   7.3 


 


Eos % (Auto)   0.1 


 


Baso % (Auto)   0.7 


 


Neut # (Auto)   16.0 H 


 


Lymph # (Auto)   0.7 L 


 


Mono # (Auto)   1.3 H 


 


Eos # (Auto)   0.0 


 


Baso # (Auto)   0.1 


 


Neutrophils % (Manual)   85 H 


 


Band Neutrophils %   3 H 


 


Lymphocytes % (Manual)   4 L 


 


Monocytes % (Manual)   8 


 


Platelet Estimate   Normal 


 


Large Platelets   Present 


 


Giant Platelets   Present 


 


Anisocytosis (manual)   Slight 


 


Sodium  134  


 


Potassium  3.2 L  


 


Chloride  95 L  


 


Carbon Dioxide  25  


 


Anion Gap  17  


 


BUN  9  


 


Creatinine  0.5 L  


 


Est GFR ( Amer)  > 60  


 


Est GFR (Non-Af Amer)  > 60  


 


Random Glucose  162 H  


 


Uric Acid  2.1 L  


 


Calcium  9.1  


 


NT-Pro-B Natriuret Pep    898


 


Digoxin   














  04/10/18 04/11/18 04/11/18





  14:21 04:25 04:25


 


WBC   15.4 H 


 


RBC   4.31 


 


Hgb   13.0 


 


Hct   38.5 


 


MCV   89.3 


 


MCH   30.2 


 


MCHC   33.8 


 


RDW   16.2 H 


 


Plt Count   212 


 


MPV   


 


Neut % (Auto)   


 


Lymph % (Auto)   


 


Mono % (Auto)   


 


Eos % (Auto)   


 


Baso % (Auto)   


 


Neut # (Auto)   


 


Lymph # (Auto)   


 


Mono # (Auto)   


 


Eos # (Auto)   


 


Baso # (Auto)   


 


Neutrophils % (Manual)   


 


Band Neutrophils %   


 


Lymphocytes % (Manual)   


 


Monocytes % (Manual)   


 


Platelet Estimate   


 


Large Platelets   


 


Giant Platelets   


 


Anisocytosis (manual)   


 


Sodium    141


 


Potassium    3.4 L


 


Chloride    97 L


 


Carbon Dioxide    28


 


Anion Gap    19


 


BUN    13


 


Creatinine    0.4 L


 


Est GFR ( Amer)    > 60


 


Est GFR (Non-Af Amer)    > 60


 


Random Glucose    173 H


 


Uric Acid   


 


Calcium    8.7


 


NT-Pro-B Natriuret Pep   


 


Digoxin  < 0.4 L  








 





 





Accession No. : [id number]


Patient Name / ID : MIGDALIA CALI  / [id number]


Exam Date : 04/10/2018 19:44:28 ( Approved )


Study Comment : 


Sex / Age : F  / 62Y





Creator : Bruce Clemente MD


Dictator : Bruce Clemente MD


 : 


Approver : Bruce Clemente MD


Approver2 : 





Report Date : 04/11/2018 11:47:26


My Comment : 


********************************************************************************

***





PROCEDURE:  MR LUMBAR SPINE WITH AND WITHOUT CONTRAST





HISTORY:


back pain hx of MM with mets





COMPARISON:


None available. 





TECHNIQUE:


Multiecho multiplanar sequences were performed through the lumbar spine with 

and without the use of intravenous contrast.





FINDINGS:


Stable normal curvature is appreciate without acute fracture or 

spondylolisthesis identified overall. L1 and L3 vertebral bodies are diminished 

in height somewhat which partially a function of Schmorl's nodes developing at 

both superior and inferior endplates of L1 with chronic, mild fractures felt to 

present both levels nevertheless. Marrow signal is moderately inhomogeneous, 

typically at the sacrum indicative of this patient's history of multiple 

myeloma. Marked multilevel disc desiccation is reiterated with disc height loss 

again evident at the mid inferior lumbar levels.  Disc desiccation is least at 

the L1-2 intervertebral disc which also exhibits nearly normal height. Conus 

medullaris remains normal in signal intensity and contour terminating at L2 

once again. Prevertebral and paraspinal soft tissues remain unremarkable 

appearing at the lumbar spine though signal changes are apparent in the soft 

tissues immediately posterior to the left T11-12 facet joint.  Please see 

separate thoracic spine MRI for additional details.





Post gadolinium enhancement is seen along the dura diffusely throughout the 

lumbar spine with thickening primarily the posterior at L1-L2 incidentally at 

T11 and T12. A mild amount of fluid is suggested at the posterior epidural 

space appearing variable from T11 down to the L3 level heterogeneously. No 

intrathecal abnormal enhancement is appreciated. While plasmacytoma or even 

focal multiple myeloma lesions may infiltrate locally into dura, they should 

result in focal enhancement rather than diffuse.  As result, etiology of dural 

enhancement is unclear and may vary from a broad a set of etiologies from 

infectious or inflammatory causes to neoplasm.  Further clinical correlation is 

recommended. 











T12-L1:


No disc herniation. Thickened right greater than left posterior dura compresses 

the conus medullaris without reactive change associated and results in mild 

central canal stenosis overall, also right greater than left. No significant 

neural foraminal stenosis bilaterally. 





L1-2:


No disc herniation. A disc osteophyte complex combines with facet arthropathy 

and posterior dural thickening resulting an moderate severe central canal 

stenosis.  Degenerative mild-to-moderate neural foraminal stenoses are symmetric

, bilateral. 





L2-3:


No disc herniation. A disc osteophyte complex combines with facet arthropathy 

and prominent posterior dural thickening resulting in moderate to severe 

central canal stenosis. Moderate, bilateral degenerative neural foraminal 

stenosis is symmetric. 





L3-4:


An additional disc osteophyte complex is appreciated combined with facet 

arthropathy.  Anterior and posterior epidural lesions contribute to result in a 

moderate to severe central canal stenosis.  The anterior epidural lesion 

exhibits borderline enhancement with peripheral enhancement noted posteriorly. 

Borderline degenerative bilateral neural foraminal stenoses are identified. The 

anterior epidural defect is left paracentral/ lateral in location with disc 

herniation not excluded but not favored here given multifocal epidural changes 

not associated with the intervertebral discs. This anterior epidural component 

appears at the upper mid vertebral body level and extends down to the L3-4 

level. 





L4-5:


No disc herniation. Mild central stenosis results from gross facet joint 

degenerative arthropathy combined with a generalized circumferential disc 

bulge. Moderate bilateral neural foraminal stenosis is identified. 





L5-S1:


No disc herniation. Prominent facet degenerative changes are identified as well 

as a limited circumferential disc bulge encroaching the lateral recesses 

somewhat but without significant central stenosis resulting in a generalized 

fashion. Mild bilateral neural foraminal stenosis appreciated degenerative. 





OTHER FINDINGS:


None. 





IMPRESSION:


1. Multifocal moderate to severe central canal stenoses are identified at at L1-

2, L2-3 L3-4 with mild central stenosis identified at T12-L1 and L4-5 due to 

combination of degenerative findings as well as abnormal epidural changes, 

predominately posterior, suspicious for infectious or infiltrative process. 

Infiltration from multiple myeloma is not favored but is not excluded. Anterior 

epidural changes at the L3/L3-4 level are felt to represent the same process 

that is predominate current posteriorly though small disc herniation difficult 

to completely exclude here. 





2. Chronic compression fractures L1 and L3 in the interval.





Accession No. : [id number]


Patient Name / ID : MIGDALIA CALI  / [id number]


Exam Date : 04/10/2018 20:24:22 ( Approved )


Study Comment : 


Sex / Age : F  / 62Y





Creator : Bruce Clemente MD


Dictator : Bruce Clemente MD


 : 


Approver : Bruce Clemente MD


Approver2 : 





Report Date : 04/11/2018 15:05:21


My Comment : 


********************************************************************************

***





PROCEDURE:  MR THORACIC SPINE WITH AND WITHOUT CONTRAST





HISTORY:


back pain hx of MM with mets





COMPARISON:


Thoracic spine MRI with and without contrast 11/11/2014.





TECHNIQUE:


Multiecho multiplanar sequences were performed through the thoracic spine with 

and without the use of intravenous contrast (Omniscan 22 cc intravenous).





FINDINGS:





ALIGNMENT:


A kyphotic deformity is now apparent secondary to increased fractures in the 

thoracic spine which appeared re- chronic including severe fracture of T6 and 

increased severe fracture of T8 and moderate severe compression fracture of 

T10.  Lesser compression fractures appears stable at multiple levels once 

again. Marrow signal throughout the mid to inferior thoracic vertebral bodies 

is inhomogeneous including the posterior elements, suggestive of patient's 

known history of multiple myeloma. No spondylolisthesis.





The thoracic spinal cord appears grossly normal in overall signal with variable 

interval stenoses identified caused by apparent posterior epidural fluid/dural 

thickening. Prominent dural enhancement is appreciated throughout the thoracic 

spine and potentially even at the inferior cervical dura. There is a variable 

pattern of bright T2 and intermediate to diminished T1 signal likely separate 

from the dura at the posterior epidural space mature enhances following 

intravenous gadolinium administration brightly and appears somewhat thickened 

with these features less apparent anteriorly than at the posterior epidural 

space. Consider infectious process including potential epidural abscess though 

an infiltrative process, including neoplasm or inflammatory process is not 

completely excluded. Hemorrhage is felt to be unlikely. No definitive 

intrathecal abnormal enhances felt to be present.





VERTEBRA:


As above.





MARROW:


As above.





PARASPINAL SOFT TISSUES:


Prevertebral and anterior paraspinal soft tissues appear unremarkable.  

Posterior paraspinal soft tissues are limited evaluation due to failure of fat 

suppression. Prominent bony signal abnormalities are appreciated, primarily 

edematous, of the proximal right 7th rib, proximal right 8th rib left T10 

transverse process and left T12/L1 facet joint, likely is a function of 

multiple myeloma. Old left 5th rib fracture noted.





CORD:


There is mild-to-moderate stenosis at the T 11 and T12 levels due to posterior 

epidural soft tissue or fluid and dural thickening combining with limited 

posttraumatic ridging and facet arthropathy at T11-12 and T10-11 levels. There 

borderline cord reactive changes at T11. There is no abnormal enhancement 

throughout the thoracic spinal cord. Interval stenoses are identified as 

compared to 11/11/2014 prior MRI.





DISCS:


No definite disc herniation throughout the examination. Moderate bilateral T6 

degenerative neural foraminal stenoses are encountered as well as at the T11 

root foramina bilaterally.





ENHANCEMENT:


As discussed above.





OTHER FINDINGS:


Incidental minimal bilateral pleural effusions.





IMPRESSION:


Diffuse dural enhancement is appreciated greater posteriorly than anteriorly 

which is likely a function of fluid or soft tissue in the posterior epidural 

space heterogeneous in shape resulting in variable central canal stenoses which 

are predominantly mild at the upper and mid thoracic spine but worsen to 

moderate to severe at the T11 and T12 levels with limited compression of the 

inferior thoracic cord.  Trace cord reaction is questioned at T11 though this 

is not definite. No abnormal intrathecal enhancement. Etiology of this finding 

is unclear but may be a function of infectious or inflammatory causes with 

infiltrate from multiple myeloma not favored which usually a focal as in 

relatively diffuse process.  Neoplasm is not completely excluded in hemorrhage 

is unlikely but including differs diagnosis.





Assessment & Plan


(1) Back pain


Status: Acute   





(2) Atrial fib/flutter, transient


Status: Acute   





(3) Fever


Status: Acute   





- Assessment and Plan (Free Text)


Assessment: 





A/P-


62 year old female with CHF, A,fib, Multiple Myeloma with mets and undergoing 

chemo and radiation  admitted with severe back pian and urinary retention, low 

garde fever and mild leukocytosis.





no cord compression leptomeningeal thickening as per lumbar and thoracic MRI 

reports and as per hospitalist initially it was concerning for infectious 

process but now more c/w inflammatory changes vs mets/malignancy.


neurosurgery bx of the cord to r/o inflmmation secondary to MM vs malignancy.


 as per oncologist continue with steroids.








plan-


check blood cx x 2.


check UA and urine cx.


decision regarding spinal cord bx vs LP as per neuro/neurosurgery.


eventhough no mention of any epidural fluid collection or abscess on MRI report 

still advise to keep on empiric abx for now pending further results to cover 

for nosocomial pathogens since pt. has been in hospital recently and is immune 

suppressed .


advise to place on empiric vanco and cefepime pending further results.


monitor temp and wbc.





 all above d/w patient and she verbalizes full understanding of al above.





 all above also d/w Hospitalist .





Thank you for allowing me to take part in the care of this patient.

## 2018-04-11 NOTE — MRI
PROCEDURE:  MR THORACIC SPINE WITH AND WITHOUT CONTRAST



HISTORY:

back pain hx of MM with mets



COMPARISON:

Thoracic spine MRI with and without contrast 11/11/2014.



TECHNIQUE:

Multiecho multiplanar sequences were performed through the thoracic 

spine with and without the use of intravenous contrast (Omniscan 22 

cc intravenous).



FINDINGS:



ALIGNMENT:

A kyphotic deformity is now apparent secondary to increased fractures 

in the thoracic spine which appeared re- chronic including severe 

fracture of T6 and increased severe fracture of T8 and moderate 

severe compression fracture of T10.  Lesser compression fractures 

appears stable at multiple levels once again. Marrow signal 

throughout the mid to inferior thoracic vertebral bodies is 

inhomogeneous including the posterior elements, suggestive of 

patient's known history of multiple myeloma. No spondylolisthesis.



The thoracic spinal cord appears grossly normal in overall signal 

with variable interval stenoses identified caused by apparent 

posterior epidural fluid/dural thickening. Prominent dural 

enhancement is appreciated throughout the thoracic spine and 

potentially even at the inferior cervical dura. There is a variable 

pattern of bright T2 and intermediate to diminished T1 signal likely 

separate from the dura at the posterior epidural space mature 

enhances following intravenous gadolinium administration brightly and 

appears somewhat thickened with these features less apparent 

anteriorly than at the posterior epidural space. Consider infectious 

process including potential epidural abscess though an infiltrative 

process, including neoplasm or inflammatory process is not completely 

excluded. Hemorrhage is felt to be unlikely. No definitive 

intrathecal abnormal enhances felt to be present.



VERTEBRA:

As above.



MARROW:

As above.



PARASPINAL SOFT TISSUES:

Prevertebral and anterior paraspinal soft tissues appear 

unremarkable.  Posterior paraspinal soft tissues are limited 

evaluation due to failure of fat suppression. Prominent bony signal 

abnormalities are appreciated, primarily edematous, of the proximal 

right 7th rib, proximal right 8th rib left T10 transverse process and 

left T12/L1 facet joint, likely is a function of multiple myeloma. 

Old left 5th rib fracture noted.



CORD:

There is mild-to-moderate stenosis at the T 11 and T12 levels due to 

posterior epidural soft tissue or fluid and dural thickening 

combining with limited posttraumatic ridging and facet arthropathy at 

T11-12 and T10-11 levels. There borderline cord reactive changes at 

T11. There is no abnormal enhancement throughout the thoracic spinal 

cord. Interval stenoses are identified as compared to 11/11/2014 

prior MRI.



DISCS:

No definite disc herniation throughout the examination. Moderate 

bilateral T6 degenerative neural foraminal stenoses are encountered 

as well as at the T11 root foramina bilaterally.



ENHANCEMENT:

As discussed above.



OTHER FINDINGS:

Incidental minimal bilateral pleural effusions.



IMPRESSION:

Diffuse dural enhancement is appreciated greater posteriorly than 

anteriorly which is likely a function of fluid or soft tissue in the 

posterior epidural space heterogeneous in shape resulting in variable 

central canal stenoses which are predominantly mild at the upper and 

mid thoracic spine but worsen to moderate to severe at the T11 and 

T12 levels with limited compression of the inferior thoracic cord.  

Trace cord reaction is questioned at T11 though this is not definite. 

No abnormal intrathecal enhancement. Etiology of this finding is 

unclear but may be a function of infectious or inflammatory causes 

with infiltrate from multiple myeloma not favored which usually a 

focal as in relatively diffuse process.  Neoplasm is not completely 

excluded in hemorrhage is unlikely but including differs diagnosis.

## 2018-04-11 NOTE — CP.PCM.CON
History of Present Illness





- History of Present Illness


History of Present Illness: 





This is a 62 yrs old female who was diagnosed to have a multiple myeloma when 

in 2014 she presented with a left upper lobe lung mass and a mediastinal mass,

with some bone metastasis. A biopsy of the lung lesion showed multiple myeloma, 

and was confirmed with bone marrow and Protein electrephoresis and 

immunofixation. She was started on chemotherapy with bortezomib + decadron and 

then switched to kyprolis with which she did very well. She responded well as 

far as the myeloma was concerned , but she still had skelletal metastasis. She 

was given zometa every 4 weeks along with revlimid for maintenence therapy.


She did well until her left 8th rib started causing her a fair amount of ain. 

She received RT to the left upper lobe mass and left rib cage and pt felt well. 

She was admitted to the hospital for shortness of breath and was found to have 

a florid acute upon chronic CHF. She was treated by her cardiologist  and did 

well. She was due to get her chemotherapy on the 9th of march but could not 

come because she was unable to get out of bed and stand. She came to the ER 

from where was admitted for possible spinal cord compression and intractable 

pain.


She had a MRI done which did not show any compression, but showed a thickening 

of the spinal cord. She is to be evaluated by a neurologist


P/H HTN,Chronic CHF and AFib.





Past Patient History





- Infectious Disease


Hx of Infectious Diseases: None





- Tetanus Immunizations


Tetanus Immunization: Unknown





- Past Medical History & Family History


Past Medical History?: Yes





- Past Social History


Smoking Status: Never Smoked





- CARDIAC


Hx Cardiac Disorders: Yes


Hx Atrial Fibrillation: Yes


Hx Cardia Arrhythmia: Yes


Hx Congestive Heart Failure: Yes


Hx Hypertension: Yes





- PULMONARY


Hx Respiratory Disorders: Yes


Hx Pneumonia: Yes





- NEUROLOGICAL


Hx Neurological Disorder: No





- HEENT


Hx HEENT Problems: No





- RENAL


Hx Chronic Kidney Disease: No





- ENDOCRINE/METABOLIC


Hx Endocrine Disorders: No





- HEMATOLOGICAL/ONCOLOGICAL


Hx Blood Disorders: No


Hx AIDS: No


Hx Human Immunodeficiency Virus (HIV): No





- INTEGUMENTARY


Hx Dermatological Problems: No





- MUSCULOSKELETAL/RHEUMATOLOGICAL


Hx Musculoskeletal Disorders: Yes


Hx Back Pain: Yes


Hx Falls: No





- GASTROINTESTINAL


Hx Gastrointestinal Disorders: No





- GENITOURINARY/GYNECOLOGICAL


Hx Genitourinary Disorders: Yes


Hx Urinary Tract Infection: Yes


Other/Comment: Uterine Fibroids





- PSYCHIATRIC


Hx Psychophysiologic Disorder: No


Hx Substance Use: No





- SURGICAL HISTORY


Hx Surgeries: Yes


Hx Appendectomy: Yes


Hx Cholecystectomy: Yes


Hx Hysterectomy: Yes





- ANESTHESIA


Hx Anesthesia: Yes


Hx Anesthesia Reactions: No


Hx Malignant Hyperthermia: No





Meds


Allergies/Adverse Reactions: 


 Allergies











Allergy/AdvReac Type Severity Reaction Status Date / Time


 


No Known Allergies Allergy   Verified 04/08/18 15:46














- Medications


Medications: 


 Current Medications





Dexamethasone (Decadron Inj)  8 mg IVP Q8H [facility]


   Last Admin: 04/11/18 09:24 Dose:  8 mg


Lactated Ringer's (Lactated Ringer's)  1,000 mls @ 75 mls/hr IV .[id number] [facility]


   Last Admin: 04/11/18 02:08 Dose:  75 mls/hr


Piperacillin Sod/Tazobactam (Sod 3.375 gm/ Sodium Chloride)  100 mls @ 100 mls/

hr IVPB Q6 [facility]


   PRN Reason: Protocol


   Last Admin: 04/11/18 09:25 Dose:  100 mls/hr


Vancomycin HCl 1 gm/ Sodium (Chloride)  250 mls @ 166.667 mls/hr IVPB Q12H [facility]


   PRN Reason: Protocol


   Last Admin: 04/11/18 02:15 Dose:  166.667 mls/hr


Dextrose/Sodium Chloride (Dextrose 5%/0.9% Ns 1000 Ml)  1,000 mls @ 70 mls/hr 

IV .[id number] [facility]


   Stop: 04/12/18 10:39


   Last Admin: 04/11/18 10:51 Dose:  70 mls/hr


Morphine Sulfate (Morphine)  2 mg IVP Q4 PRN


   PRN Reason: Pain, moderate (4-7)


   Last Admin: 04/11/18 02:10 Dose:  2 mg


Morphine Sulfate (Morphine)  5 mg IVP Q6H PRN


   PRN Reason: Pain, severe (8-10)


Pregabalin (Lyrica)  75 mg PO BID [facility]


Prochlorperazine (Compazine)  10 mg IVP Q8H PRN


   PRN Reason: Nausea/Vomiting











Physical Exam





- Additional Findings


Additional findings: 





Physical exam; alert, well oriented, in no acute distress


neck; supple, no adenopathy


Chest; Air entry good, but she has scattered rales in the basees.


Heart; Tachycardia, no murmur


Abd; Obese, no mass or h/s megaly


Neurologically exam appears normal except she cannot do straight leg raises.





Results





- Vital Signs


Recent Vital Signs: 


 Last Vital Signs











Temp  98.7 F   04/11/18 07:53


 


Pulse  93 H  04/11/18 07:53


 


Resp  18   04/11/18 07:53


 


BP  144/83   04/11/18 07:53


 


Pulse Ox  97   04/11/18 07:53














- Labs


Result Diagrams: 


 04/11/18 04:25





 04/11/18 04:25


Labs: 


 Laboratory Results - last 24 hr











  04/10/18 04/10/18 04/10/18





  12:05 12:05 14:21


 


WBC   18.1 H D 


 


RBC   4.91 


 


Hgb   14.8  D 


 


Hct   43.8 


 


MCV   89.2 


 


MCH   30.2 


 


MCHC   33.9 


 


RDW   16.2 H 


 


Plt Count   213 


 


MPV   9.4 


 


Neut % (Auto)   88.3 H 


 


Lymph % (Auto)   3.6 L 


 


Mono % (Auto)   7.3 


 


Eos % (Auto)   0.1 


 


Baso % (Auto)   0.7 


 


Neut # (Auto)   16.0 H 


 


Lymph # (Auto)   0.7 L 


 


Mono # (Auto)   1.3 H 


 


Eos # (Auto)   0.0 


 


Baso # (Auto)   0.1 


 


Neutrophils % (Manual)   85 H 


 


Band Neutrophils %   3 H 


 


Lymphocytes % (Manual)   4 L 


 


Monocytes % (Manual)   8 


 


Platelet Estimate   Normal 


 


Large Platelets   Present 


 


Giant Platelets   Present 


 


Anisocytosis (manual)   Slight 


 


Sodium  134  


 


Potassium  3.2 L  


 


Chloride  95 L  


 


Carbon Dioxide  25  


 


Anion Gap  17  


 


BUN  9  


 


Creatinine  0.5 L  


 


Est GFR ( Amer)  > 60  


 


Est GFR (Non-Af Amer)  > 60  


 


Random Glucose  162 H  


 


Uric Acid  2.1 L  


 


Calcium  9.1  


 


NT-Pro-B Natriuret Pep    898


 


Digoxin   














  04/10/18 04/11/18 04/11/18





  14:21 04:25 04:25


 


WBC   15.4 H 


 


RBC   4.31 


 


Hgb   13.0 


 


Hct   38.5 


 


MCV   89.3 


 


MCH   30.2 


 


MCHC   33.8 


 


RDW   16.2 H 


 


Plt Count   212 


 


MPV   


 


Neut % (Auto)   


 


Lymph % (Auto)   


 


Mono % (Auto)   


 


Eos % (Auto)   


 


Baso % (Auto)   


 


Neut # (Auto)   


 


Lymph # (Auto)   


 


Mono # (Auto)   


 


Eos # (Auto)   


 


Baso # (Auto)   


 


Neutrophils % (Manual)   


 


Band Neutrophils %   


 


Lymphocytes % (Manual)   


 


Monocytes % (Manual)   


 


Platelet Estimate   


 


Large Platelets   


 


Giant Platelets   


 


Anisocytosis (manual)   


 


Sodium    141


 


Potassium    3.4 L


 


Chloride    97 L


 


Carbon Dioxide    28


 


Anion Gap    19


 


BUN    13


 


Creatinine    0.4 L


 


Est GFR ( Amer)    > 60


 


Est GFR (Non-Af Amer)    > 60


 


Random Glucose    173 H


 


Uric Acid   


 


Calcium    8.7


 


NT-Pro-B Natriuret Pep   


 


Digoxin  < 0.4 L  














Assessment & Plan





- Assessment and Plan (Free Text)


Assessment: 





Imp; Multiple myeloma diffuse skeletal metastasis, and weakness of legs.R?O 

spinal cord compression


Plan: 





Plan; Will await neurologist's consult and continue to give her decadson.





- Date & Time


Date: 04/11/18


Time: 12:06

## 2018-04-11 NOTE — CON
NEUROLOGY CONSULTATION



DATE:  04/11/2018



CHIEF COMPLAINT:  Lower extremity weakness and lower extremity back pain.



HISTORY OF PRESENT ILLNESS:  This is a 62-year-old woman with a past

medical history of multiple myeloma diagnosed in 2014.  She presented with

AFib, anticoagulant on Xarelto; essential hypertension; and CHF who came

into the ER for worsening back pain.  The patient has chronic back pain due

to multiple myeloma; however, the pain was much worse, but she had urinary

retention and difficulty moving her lower extremities in terms because it

was painful and she has pain radiating down both legs with some

paresthesias.  She has a history of left upper lobe lung mass and

mediastinal mass with some bone metastasis also.  Biopsy of the lung lesion

showed multiple myeloma and confirmed bone marrow and protein

electrophoresis immunofixation.  She was started on chemotherapy by

oncology.  According to that, she did very well.  Now, she comes back for

chronic worsening lower back pain.  Her MRI of the thoracic spine showed

dural enhancement appreciated greatly posterior and anteriorly, which is

likely a functional or fluid or soft tissue in the posterior epidural space

_____ resulting in a variable central canal stenosis predominantly at the

mid thoracic spine, but worsening to moderate-to-severe at the T11 and T12

levels with limited compression inferior thoracic cord.  There is no

abnormal intrathecal enhancement, neurosurgery states no neurosurgical

intervention at this time.  Her MRI of the lumbosacral spine showed

multifocal moderate-to-severe central canal stenosis at L1-L4, but mild

central canal stenosis at L4-L5 due to combination of degenerative _____

findings as well as abnormal epidural changes predominantly posteriorly

suspicious for infectious and inflammatory process.  She is able to drag

her legs, but has some mild pain.  Lyrica has been started at 75 mg p.o.

b.i.d. for neuropathic relief.  She would probably need a lumbar puncture

by anesthesia not at bedside, the patient will be able to tolerate at

bedside and probably need a biopsy eventually, which needs to be done at

[facility], probably need evaluation for radiation oncology as

well.



PAST MEDICAL HISTORY:  History of AFib, anticoagulated on Xarelto,

essential hypertension, multiple myeloma, status post bony mets as well as

lung mets, and CHF.



FAMILY HISTORY:  Noncontributory.



SOCIAL HISTORY:  No illicit drug use, smoking or EtOH abuse.



REVIEW OF SYSTEMS:  A 14-point review of systems is negative except in the

HPI.



ALLERGIES:  NO KNOWN DRUG ALLERGIES.



MEDICATIONS:  Reviewed by nurse reconciliation sheet.



PHYSICAL EXAMINATION:

VITAL SIGNS:  Temperature 98, pulse rate 98, blood pressure 131/70,

respiratory rate 19, and oxygen saturations 96% on room air.

GENERAL:  The patient is sitting up in bed, in no acute distress, and

conversing well.

HEENT:  Atraumatic and normocephalic.  PERRLA.  Extraocular muscles intact.

NECK:  Supple.  No JVD.  No adenopathy noted.

LUNGS:  Clear to auscultation.  No adventitious sounds.

HEART:  S1 and S2.  Normal rate and rhythm.  No murmurs, rubs or gallops.

ABDOMEN:  Soft, nontender and nondistended.  Bowel sounds are present.

EXTREMITIES:  No clubbing.  No cyanosis.  Peripheral pulses 2+ felt

bilaterally.

NEUROLOGIC:  The patient is alert and oriented to person, place, month, and

year.  Speech is fluent without any errors.  Cranial nerves II through XII

intact.  Motor exam:  Moves all extremities equally and is able to drag her

lower extremities, but it is difficult to do straight leg due to severe low

back pain.  Her DTRs are 1+ throughout.  Sensory:  Decreased light touch

and pinprick up to the calves bilaterally.  Decreased vibration at the

toes.  DTRs are 2+ throughout, 1 at both knees and ankles.  Coordination: 

Finger-to-nose intact.  No dysmetria noted.  Gait is deferred for now.



LABORATORY DATA:  Sodium of 141, potassium of 4.0, chloride of 97, carbon

dioxide of 28, BUN of 13, creatinine of 0.4, and random glucose of 173.



ASSESSMENT AND PLAN:  This is a 62-year-old woman with a history of atrial

fibrillation, on Xarelto; hypertension; congestive heart failure;

overweight, and multiple myeloma with diffuse skeletal metastasis, who came

in for worsening lower back pain radiating down to the legs with

paresthesias and urinary incontinence as long as some mild lower extremity

weakness, who was found to have no obvious spinal cord compression, but has

diffuse leptomeningeal thickening that was spinal cord, which is on the MRI

thoracic and lumbosacral spine, which is consistent with likely metastatic

disease from multiple myeloma.



RECOMMENDATIONS:

1.  At this time, we will recommend Lyrica 75 mg p.o. b.i.d. and can go to

t.i.d. even for neuropathic relief.

2.  Neurosurgery recommend no surgical intervention.  Her chronic back pain

with urinary retention is likely secondary to diffuse leptomeningeal

thickening, which is seen on the MRI.  At this time, we will recommend

Decadron 8 mg IV every 8 hours and can reduce to 6 mg IV every 6 hours.





__________________________________________

Abram Lynn MD





DD:  04/11/2018 17:13:21

DT:  04/11/2018 18:55:37

Job # [id number]

## 2018-04-11 NOTE — MRI
PROCEDURE:  MR LUMBAR SPINE WITH AND WITHOUT CONTRAST



HISTORY:

back pain hx of MM with mets



COMPARISON:

None available. 



TECHNIQUE:

Multiecho multiplanar sequences were performed through the lumbar 

spine with and without the use of intravenous contrast.



FINDINGS:

Stable normal curvature is appreciate without acute fracture or 

spondylolisthesis identified overall. L1 and L3 vertebral bodies are 

diminished in height somewhat which partially a function of Schmorl's 

nodes developing at both superior and inferior endplates of L1 with 

chronic, mild fractures felt to present both levels nevertheless. 

Marrow signal is moderately inhomogeneous, typically at the sacrum 

indicative of this patient's history of multiple myeloma. Marked 

multilevel disc desiccation is reiterated with disc height loss again 

evident at the mid inferior lumbar levels.  Disc desiccation is least 

at the L1-2 intervertebral disc which also exhibits nearly normal 

height. Conus medullaris remains normal in signal intensity and 

contour terminating at L2 once again. Prevertebral and paraspinal 

soft tissues remain unremarkable appearing at the lumbar spine though 

signal changes are apparent in the soft tissues immediately posterior 

to the left T11-12 facet joint.  Please see separate thoracic spine 

MRI for additional details.



Post gadolinium enhancement is seen along the dura diffusely 

throughout the lumbar spine with thickening primarily the posterior 

at L1-L2 incidentally at T11 and T12. A mild amount of fluid is 

suggested at the posterior epidural space appearing variable from T11 

down to the L3 level heterogeneously. No intrathecal abnormal 

enhancement is appreciated. While plasmacytoma or even focal multiple 

myeloma lesions may infiltrate locally into dura, they should result 

in focal enhancement rather than diffuse.  As result, etiology of 

dural enhancement is unclear and may vary from a broad a set of 

etiologies from infectious or inflammatory causes to neoplasm.  

Further clinical correlation is recommended. 







T12-L1:

No disc herniation. Thickened right greater than left posterior dura 

compresses the conus medullaris without reactive change associated 

and results in mild central canal stenosis overall, also right 

greater than left. No significant neural foraminal stenosis 

bilaterally. 



L1-2:

No disc herniation. A disc osteophyte complex combines with facet 

arthropathy and posterior dural thickening resulting an moderate 

severe central canal stenosis.  Degenerative mild-to-moderate neural 

foraminal stenoses are symmetric, bilateral. 



L2-3:

No disc herniation. A disc osteophyte complex combines with facet 

arthropathy and prominent posterior dural thickening resulting in 

moderate to severe central canal stenosis. Moderate, bilateral 

degenerative neural foraminal stenosis is symmetric. 



L3-4:

An additional disc osteophyte complex is appreciated combined with 

facet arthropathy.  Anterior and posterior epidural lesions 

contribute to result in a moderate to severe central canal stenosis.  

The anterior epidural lesion exhibits borderline enhancement with 

peripheral enhancement noted posteriorly. Borderline degenerative 

bilateral neural foraminal stenoses are identified. The anterior 

epidural defect is left paracentral/ lateral in location with disc 

herniation not excluded but not favored here given multifocal 

epidural changes not associated with the intervertebral discs. This 

anterior epidural component appears at the upper mid vertebral body 

level and extends down to the L3-4 level. 



L4-5:

No disc herniation. Mild central stenosis results from gross facet 

joint degenerative arthropathy combined with a generalized 

circumferential disc bulge. Moderate bilateral neural foraminal 

stenosis is identified. 



L5-S1:

No disc herniation. Prominent facet degenerative changes are 

identified as well as a limited circumferential disc bulge 

encroaching the lateral recesses somewhat but without significant 

central stenosis resulting in a generalized fashion. Mild bilateral 

neural foraminal stenosis appreciated degenerative. 



OTHER FINDINGS:

None. 



IMPRESSION:

1. Multifocal moderate to severe central canal stenoses are 

identified at at L1-2, L2-3 L3-4 with mild central stenosis 

identified at T12-L1 and L4-5 due to combination of degenerative 

findings as well as abnormal epidural changes, predominately 

posterior, suspicious for infectious or infiltrative process. 

Infiltration from multiple myeloma is not favored but is not 

excluded. Anterior epidural changes at the L3/L3-4 level are felt to 

represent the same process that is predominate current posteriorly 

though small disc herniation difficult to completely exclude here. 



2. Chronic compression fractures L1 and L3 in the interval.

## 2018-04-11 NOTE — CP.PCM.PN
Subjective





- Date & Time of Evaluation


Date of Evaluation: 04/11/18


Time of Evaluation: 10:00





- Subjective


Subjective: 





Patient was seen and examined; still with significant amount of pain 

particularly near the lumbar spine. She is complaining of some mild congestion 

today as well. Denies any weakness of lower extremities. Has no other 

complaints. Diet is restarted as lumbar puncture was canceled. 





Objective





- Vital Signs/Intake and Output


Vital Signs (last 24 hours): 


 











Temp Pulse Resp BP Pulse Ox


 


 98.1 F   102 H  18   146/76   98 


 


 04/11/18 12:04  04/11/18 12:04  04/11/18 12:04  04/11/18 12:04  04/11/18 12:04











- Medications


Medications: 


 Current Medications





Allopurinol (Zyloprim)  100 mg PO DAILY [facility]


Carvedilol (Coreg)  25 mg PO Q12 [facility]


Dexamethasone (Decadron Inj)  8 mg IVP Q8H [facility]


   Last Admin: 04/11/18 09:24 Dose:  8 mg


Digoxin (Digoxin)  0.125 mg PO DAILY [facility]


Famotidine (Pepcid)  20 mg PO DAILY [facility]


Furosemide (Lasix)  20 mg PO BID [facility]


Guaifenesin/Dextromethorphan (Robitussin Dm)  10 ml PO Q4 PRN


   PRN Reason: Cough


Home Med (Lenalidomide [Revlimid])  15 mg PO DAILY [facility]


Lisinopril (Zestril)  10 mg PO DAILY [facility]


Morphine Sulfate (Morphine)  2 mg IVP Q4 PRN


   PRN Reason: Pain, moderate (4-7)


   Last Admin: 04/11/18 02:10 Dose:  2 mg


Morphine Sulfate (Morphine)  5 mg IVP Q6H PRN


   PRN Reason: Pain, severe (8-10)


Potassium Chloride (K-Dur 20 Meq Er Tab)  20 meq PO BID [facility]


Pregabalin (Lyrica)  75 mg PO BID [facility]


Prochlorperazine (Compazine)  10 mg IVP Q8H PRN


   PRN Reason: Nausea/Vomiting











- Labs


Labs: 


 





 04/11/18 04:25 





 04/11/18 04:25 











- Additional Findings


Additional findings: 





Physical exam:





Constitutional- cooperative, awake, alert, appears uncomfortable. 


Head- NCAT, PERRL


Eye- PERRL, EOMI


ENT- normal exam, MMM.


Neck- normal inspection, supple, no JVD


Respiratory- CTAB, no wheezes rales rhonchi


Cardiovascular- RRR, +S1, +S2 no MRG


GI/Abdominal- obese, normal bowel sounds, soft, no mass, no hsm


Skin- warm, dry


Extremities Exam- normal capillary refill, normal inspection


Neurological Exam- alert, awake, oriented, no motor or sensory deficits, CN II-

XII intact


Psych- normal affect appears anxious








Assessment and Plan





- Assessment and Plan (Free Text)


Plan: 





This is a 62 year old female with a past medical history significant for atrial 

fibrillation anticoagulated on Xarelto, essential hypertension, Multiple myeloma

, and CHF, who presented to the ED complaining of worsening back pain. The 

patient has chronic back pain due to the multiple myeloma, however today the 

pain became much worse. In addition the patient was found to have urinary 

retention since this morning, prompting her to come to the ED. In the ED, the 

patient was found to be hemodynamically stable although in severe pain not 

relieved by Percocets. Sharma catheter was placed and drained large amount of 

urine. Multiple attempts in the ED were made for MRI to assess for spinal cord 

compression (possibly due to malignancy). Patient had no motor or sensory 

deficits, no fecal or urinary incontinence, and no saddle anesthesia however. 





On 4/11, MRI of the thoracic/lumbar spine report showed no spinal cord 

compression, however it does show diffuse leptomeningeal thickening of the 

spinal cord suspicious for malignancy versus inflammation. There was originally 

concern for infection as well; however this is seen as much less likely as the 

patient has no hx of spinal surgery or signs of infection. As per discussion 

with consultants, they recommend the patient to have biopsy of the cord in 

order to ascertain if the thickening is due to metastatic disease versus 

inflammatory changes. 





1) Severe acute on chronic back pain with urinary retention, spinal cord 

compression ruled out; however now with diffuse leptomeningeal thickening on 

MRI concerning for metastatic disease vs. inflammatory changes


- Continue telemetry monitoring


- Decadron 8 mg IVP q 8 hours 


- Dr. ASHER Otto oncology consultant- recommends continuing steroids for now 


- Dr. Lynn, neurology on consultation


- Dr. Javier, neurosurgery- recommends biopsy of the 





2) Afib with RVR


- Restarted Digoxin 0.125 mg po daily


- Restarted Coreg


- Will give additional dose of digoxin if HR not controlled with po medications





3) Hypokalemia, mild


- continue po repletion


- Recheck in AM





4) Essential htn


- chronic


- restart home po medications





5) Multiple myeloma


- chronic


- Dr. ASHER Otto on consultation for heme/onc





6) Leukocytosis


- secondary to Decadron


- no evidence of infection





7) DVT prophylaxis


- will restart Xarelto

## 2018-04-11 NOTE — CARD
--------------- APPROVED REPORT --------------





EKG Measurement

Heart [id number]

[id number]

[id number]

[id number]



<Conclusion>

Atrial fibrillation with rapid ventricular response

Abnormal ECG

## 2018-04-12 LAB
APTT BLD: 29.6 SECONDS (ref 25.6–37.1)
BUN SERPL-MCNC: 17 MG/DL (ref 7–17)
CALCIUM SERPL-MCNC: 8.7 MG/DL (ref 8.4–10.2)
ERYTHROCYTE [DISTWIDTH] IN BLOOD BY AUTOMATED COUNT: 16.5 % (ref 11.5–14.5)
GFR NON-AFRICAN AMERICAN: > 60
HGB BLD-MCNC: 13 G/DL (ref 12–16)
INR PPP: 1.2 (ref 0.9–1.2)
MCH RBC QN AUTO: 30 PG (ref 27–31)
MCHC RBC AUTO-ENTMCNC: 33.6 G/DL (ref 33–37)
MCV RBC AUTO: 89.4 FL (ref 81–99)
PLATELET # BLD: 246 K/UL (ref 130–400)
PROTHROMBIN TIME: 13.8 SECONDS (ref 9.8–13.1)
RBC # BLD AUTO: 4.33 MIL/UL (ref 3.8–5.2)
WBC # BLD AUTO: 11.6 K/UL (ref 4.8–10.8)

## 2018-04-12 RX ADMIN — DIGOXIN SCH MG: 0.12 TABLET ORAL at 14:57

## 2018-04-12 RX ADMIN — POTASSIUM CHLORIDE SCH MEQ: 20 TABLET, EXTENDED RELEASE ORAL at 17:18

## 2018-04-12 RX ADMIN — DEXAMETHASONE SODIUM PHOSPHATE SCH MG: 4 INJECTION, SOLUTION INTRAMUSCULAR; INTRAVENOUS at 09:11

## 2018-04-12 RX ADMIN — DEXAMETHASONE SODIUM PHOSPHATE SCH MG: 4 INJECTION, SOLUTION INTRAMUSCULAR; INTRAVENOUS at 17:14

## 2018-04-12 RX ADMIN — DIGOXIN SCH: 0.12 TABLET ORAL at 09:15

## 2018-04-12 RX ADMIN — DEXAMETHASONE SODIUM PHOSPHATE SCH MG: 4 INJECTION, SOLUTION INTRAMUSCULAR; INTRAVENOUS at 01:04

## 2018-04-12 RX ADMIN — POTASSIUM CHLORIDE SCH: 20 TABLET, EXTENDED RELEASE ORAL at 09:15

## 2018-04-12 NOTE — CP.PCM.PN
Subjective





- Date & Time of Evaluation


Date of Evaluation: 04/12/18


Time of Evaluation: 11:00





- Subjective


Subjective: 





Patient is seen and examined at bedside. Her back pain is improved today in the 

lumbosacral area but is still c/o pain. She is for OR today for lumbar puncture 

and epidural. Has no new complaints. Overall clinically appears improved. 








Objective





- Vital Signs/Intake and Output


Vital Signs (last 24 hours): 


 











Temp Pulse Resp BP Pulse Ox


 


 97.7 F   99 H  18   134/93 H  94 L


 


 04/12/18 12:05  04/12/18 12:05  04/12/18 12:05  04/12/18 12:05  04/12/18 12:05











- Medications


Medications: 


 Current Medications





Allopurinol (Zyloprim)  100 mg PO DAILY [facility]


   Last Admin: 04/12/18 09:22 Dose:  Not Given


Carvedilol (Coreg)  25 mg PO Q12 [facility]


   Last Admin: 04/12/18 09:31 Dose:  25 mg


Dexamethasone (Decadron Inj)  8 mg IVP Q8H [facility]


   Last Admin: 04/12/18 09:11 Dose:  8 mg


Digoxin (Digoxin)  0.125 mg PO DAILY [facility]


   Last Admin: 04/12/18 09:15 Dose:  Not Given


Docusate Sodium (Colace)  200 mg PO BID [facility]


Famotidine (Pepcid)  20 mg PO DAILY [facility]


   Last Admin: 04/12/18 09:22 Dose:  Not Given


Furosemide (Lasix)  20 mg PO BID [facility]


   Last Admin: 04/12/18 09:15 Dose:  Not Given


Guaifenesin/Dextromethorphan (Robitussin Dm)  10 ml PO Q4 PRN


   PRN Reason: Cough


   Last Admin: 04/11/18 21:30 Dose:  10 ml


Home Med (Lenalidomide [Revlimid])  15 mg PO DAILY [facility]


   Last Admin: 04/12/18 09:15 Dose:  Not Given


Cefepime HCl 1 gm/ Sodium (Chloride)  100 mls @ 100 mls/hr IVPB Q8 [facility]


   PRN Reason: Protocol


   Last Admin: 04/12/18 11:16 Dose:  100 mls/hr


Vancomycin HCl 1 gm/ Sodium (Chloride)  250 mls @ 166.667 mls/hr IVPB DAILY [facility]


   PRN Reason: Protocol


   Last Admin: 04/12/18 09:19 Dose:  166.667 mls/hr


Lisinopril (Zestril)  10 mg PO DAILY [facility]


   Last Admin: 04/12/18 09:21 Dose:  10 mg


Morphine Sulfate (Morphine)  2 mg IVP Q4 PRN


   PRN Reason: Pain, moderate (4-7)


   Last Admin: 04/12/18 09:32 Dose:  2 mg


Morphine Sulfate (Morphine)  5 mg IVP Q6H PRN


   PRN Reason: Pain, severe (8-10)


Potassium Chloride (K-Dur 20 Meq Er Tab)  20 meq PO BID [facility]


   Last Admin: 04/12/18 09:15 Dose:  Not Given


Pregabalin (Lyrica)  75 mg PO BID [facility]


   Last Admin: 04/12/18 09:16 Dose:  Not Given


Prochlorperazine (Compazine)  10 mg IVP Q8H PRN


   PRN Reason: Nausea/Vomiting











- Labs


Labs: 


 





 04/12/18 05:30 





 04/12/18 05:30 





 











PT  13.8 Seconds (9.8-13.1)  H  04/12/18  12:05    


 


INR  1.2  (0.9-1.2)   04/12/18  12:05    


 


APTT  29.6 Seconds (25.6-37.1)   04/12/18  12:05    














- Additional Findings


Additional findings: 








Physical exam:





Constitutional- cooperative, awake, alert, appears more comfortable today


Head- NCAT, PERRL


Eye- PERRL, EOMI


ENT- normal exam, MMM.


Neck- normal inspection, supple, no JVD


Respiratory- CTAB, no wheezes rales rhonchi


Cardiovascular- RRR, +S1, +S2 no MRG


GI/Abdominal- obese, normal bowel sounds, soft, no mass, no hsm


Skin- warm, dry


Extremities Exam- normal capillary refill, normal inspection


Neurological Exam- alert, awake, oriented, no motor or sensory deficits, CN II-

XII intact


Psych- normal affect, normal mood





Assessment and Plan





- Assessment and Plan (Free Text)


Plan: 





This is a 62 year old female with a past medical history significant for atrial 

fibrillation anticoagulated on Xarelto, essential hypertension, Multiple myeloma

, and CHF, who presented to the ED complaining of worsening back pain. The 

patient has chronic back pain due to the multiple myeloma, however today the 

pain became much worse. In addition the patient was found to have urinary 

retention since this morning, prompting her to come to the ED. In the ED, the 

patient was found to be hemodynamically stable although in severe pain not 

relieved by Percocets. Sharma catheter was placed and drained large amount of 

urine. Multiple attempts in the ED were made for MRI to assess for spinal cord 

compression (possibly due to malignancy). Patient had no motor or sensory 

deficits, no fecal or urinary incontinence, and no saddle anesthesia however. 





On 4/11, MRI of the thoracic/lumbar spine report showed no spinal cord 

compression, however it does show diffuse leptomeningeal thickening of the 

spinal cord suspicious for malignancy versus inflammation. There was originally 

concern for infection as well; however this is seen as much less likely as the 

patient has no hx of spinal surgery or signs of infection. As per discussion 

with consultants, they recommend the patient to have biopsy of the cord in 

order to ascertain if the thickening is due to metastatic disease versus 

inflammatory changes. 





4/12: Biopsy unable to be obtained. Lumbar puncture is being performed instead 

for cytology for further evaluation of the leptominengeal thickening. Epidural 

also being done today for pain control. PT evaluation today with hopeful 

discharge tomorrow if patient's pain improved and if she is ambulatory. 





1) Severe acute on chronic back pain with urinary retention, spinal cord 

compression ruled out; however now with diffuse leptomeningeal thickening on 

MRI concerning for metastatic disease vs. inflammatory changes. Pain now 

improved somewhat today, however has upper thoracic severe back pain


- Continue telemetry monitoring


- Decadron 8 mg IVP q 8 hours 


- Dr. ASHER Otto oncology consultant- recommends continuing steroids for now 


- Dr. Lynn, neurology on consultation


- Dr. Javier, neurosurgery- no workup


- Anesthesia consultation today for LP/epidural





2) Staph aureus in blood culture x 2 bottles, r/o endocarditis


- Staph bacteremia suspected


- Dr. Zarate on consultation


- Echocardiogram 


- Continue Vancomycin and Cefepime





3) Afib with RVR- now controlled


- Restarted Digoxin 0.125 mg po daily


- Restarted Coreg


- telemetry monitoring








4) Hypokalemia,- resolved


- continue po repletion


- Recheck in AM





5) Essential htn


- chronic


- restarted home po medications





6) Multiple myeloma


- chronic


- Dr. ASHER Otto on consultation for heme/onc


- see (1) may need further chemotherapy if LP shows cytology consistent with 

metastatses





7) Leukocytosis


- secondary to Decadron


- no evidence of infection





8) DVT prophylaxis


- will restart Xarelto

## 2018-04-12 NOTE — CP.PCM.PN
Subjective





- Date & Time of Evaluation


Date of Evaluation: 04/12/18


Time of Evaluation: 12:30





- Subjective


Subjective: 





ID note-





Pt. seen and examined today.


she states her back pain is much less today and she is smiling and happy.


denies any fever or chills





Objective





- Vital Signs/Intake and Output


Vital Signs (last 24 hours): 


 











Temp Pulse Resp BP Pulse Ox


 


 98 F   91 H  18   124/74   95 


 


 04/12/18 07:49  04/12/18 09:31  04/12/18 07:49  04/12/18 09:31  04/12/18 07:49











- Medications


Medications: 


 Current Medications





Allopurinol (Zyloprim)  100 mg PO DAILY [facility]


   Last Admin: 04/12/18 09:22 Dose:  Not Given


Carvedilol (Coreg)  25 mg PO Q12 [facility]


   Last Admin: 04/12/18 09:31 Dose:  25 mg


Dexamethasone (Decadron Inj)  8 mg IVP Q8H [facility]


   Last Admin: 04/12/18 09:11 Dose:  8 mg


Digoxin (Digoxin)  0.125 mg PO DAILY [facility]


   Last Admin: 04/12/18 09:15 Dose:  Not Given


Docusate Sodium (Colace)  200 mg PO BID [facility]


Famotidine (Pepcid)  20 mg PO DAILY [facility]


   Last Admin: 04/12/18 09:22 Dose:  Not Given


Furosemide (Lasix)  20 mg PO BID [facility]


   Last Admin: 04/12/18 09:15 Dose:  Not Given


Guaifenesin/Dextromethorphan (Robitussin Dm)  10 ml PO Q4 PRN


   PRN Reason: Cough


   Last Admin: 04/11/18 21:30 Dose:  10 ml


Home Med (Lenalidomide [Revlimid])  15 mg PO DAILY [facility]


   Last Admin: 04/12/18 09:15 Dose:  Not Given


Cefepime HCl 1 gm/ Sodium (Chloride)  100 mls @ 100 mls/hr IVPB Q8 [facility]


   PRN Reason: Protocol


   Last Admin: 04/12/18 01:03 Dose:  100 mls/hr


Vancomycin HCl 1 gm/ Sodium (Chloride)  250 mls @ 166.667 mls/hr IVPB DAILY [facility]


   PRN Reason: Protocol


   Last Admin: 04/12/18 09:19 Dose:  166.667 mls/hr


Lisinopril (Zestril)  10 mg PO DAILY [facility]


   Last Admin: 04/12/18 09:21 Dose:  10 mg


Morphine Sulfate (Morphine)  2 mg IVP Q4 PRN


   PRN Reason: Pain, moderate (4-7)


   Last Admin: 04/12/18 09:32 Dose:  2 mg


Morphine Sulfate (Morphine)  5 mg IVP Q6H PRN


   PRN Reason: Pain, severe (8-10)


Potassium Chloride (K-Dur 20 Meq Er Tab)  20 meq PO BID [facility]


   Last Admin: 04/12/18 09:15 Dose:  Not Given


Pregabalin (Lyrica)  75 mg PO BID [facility]


   Last Admin: 04/12/18 09:16 Dose:  Not Given


Prochlorperazine (Compazine)  10 mg IVP Q8H PRN


   PRN Reason: Nausea/Vomiting











- Labs


Labs: 


 








- Additional Findings


Additional findings: 





- Constitutional


Appears: No Acute Distress





- Head Exam


Head Exam: ATRAUMATIC





- Eye Exam


Eye Exam: EOMI, PERRL





- ENT Exam


ENT Exam: Normal Oropharynx





- Neck Exam


Neck exam: Positive for: Full Rom





- Respiratory Exam


Respiratory Exam: Clear to Auscultation Bilateral, NORMAL BREATHING PATTERN





- Cardiovascular Exam


Cardiovascular Exam: Irregular Rhythm


Additional comments: 





Irregularly Irregular


rate controlled





- GI/Abdominal Exam


GI & Abdominal Exam: Normal Bowel Sounds, Soft


Additional comments: 





NT, ND





- Extremities Exam


Extremities exam: Positive for: normal inspection


Additional comments: 





no edema B/L LE





- Back Exam


Back exam: tenderness


Additional comments: 





positive tenderness in mid lower lumbar and thoracic region


no lesions


no erythema





- Neurological Exam


Neurological exam: Alert, Oriented x 3





 Laboratory Results - last 72 hr











  04/10/18 04/10/18 04/10/18





  12:05 12:05 14:21


 


WBC   18.1 H D 


 


RBC   4.91 


 


Hgb   14.8  D 


 


Hct   43.8 


 


MCV   89.2 


 


MCH   30.2 


 


MCHC   33.9 


 


RDW   16.2 H 


 


Plt Count   213 


 


MPV   9.4 


 


Neut % (Auto)   88.3 H 


 


Lymph % (Auto)   3.6 L 


 


Mono % (Auto)   7.3 


 


Eos % (Auto)   0.1 


 


Baso % (Auto)   0.7 


 


Neut # (Auto)   16.0 H 


 


Lymph # (Auto)   0.7 L 


 


Mono # (Auto)   1.3 H 


 


Eos # (Auto)   0.0 


 


Baso # (Auto)   0.1 


 


Neutrophils % (Manual)   85 H 


 


Band Neutrophils %   3 H 


 


Lymphocytes % (Manual)   4 L 


 


Monocytes % (Manual)   8 


 


Platelet Estimate   Normal 


 


Large Platelets   Present 


 


Giant Platelets   Present 


 


Anisocytosis (manual)   Slight 


 


ESR   


 


PT   


 


INR   


 


APTT   


 


Sodium  134  


 


Potassium  3.2 L  


 


Chloride  95 L  


 


Carbon Dioxide  25  


 


Anion Gap  17  


 


BUN  9  


 


Creatinine  0.5 L  


 


Est GFR ( Amer)  > 60  


 


Est GFR (Non-Af Amer)  > 60  


 


Random Glucose  162 H  


 


Uric Acid  2.1 L  


 


Calcium  9.1  


 


NT-Pro-B Natriuret Pep    898


 


Urine Color   


 


Urine Clarity   


 


Urine pH   


 


Ur Specific Gravity   


 


Urine Protein   


 


Urine Glucose (UA)   


 


Urine Ketones   


 


Urine Blood   


 


Urine Nitrate   


 


Urine Bilirubin   


 


Urine Urobilinogen   


 


Ur Leukocyte Esterase   


 


Urine RBC (Auto)   


 


Urine Microscopic WBC   


 


Ur Squamous Epith Cells   


 


Digoxin   














  04/10/18 04/11/18 04/11/18





  14:21 04:25 04:25


 


WBC   15.4 H 


 


RBC   4.31 


 


Hgb   13.0 


 


Hct   38.5 


 


MCV   89.3 


 


MCH   30.2 


 


MCHC   33.8 


 


RDW   16.2 H 


 


Plt Count   212 


 


MPV   


 


Neut % (Auto)   


 


Lymph % (Auto)   


 


Mono % (Auto)   


 


Eos % (Auto)   


 


Baso % (Auto)   


 


Neut # (Auto)   


 


Lymph # (Auto)   


 


Mono # (Auto)   


 


Eos # (Auto)   


 


Baso # (Auto)   


 


Neutrophils % (Manual)   


 


Band Neutrophils %   


 


Lymphocytes % (Manual)   


 


Monocytes % (Manual)   


 


Platelet Estimate   


 


Large Platelets   


 


Giant Platelets   


 


Anisocytosis (manual)   


 


ESR   


 


PT   


 


INR   


 


APTT   


 


Sodium    141


 


Potassium    3.4 L


 


Chloride    97 L


 


Carbon Dioxide    28


 


Anion Gap    19


 


BUN    13


 


Creatinine    0.4 L


 


Est GFR ( Amer)    > 60


 


Est GFR (Non-Af Amer)    > 60


 


Random Glucose    173 H


 


Uric Acid   


 


Calcium    8.7


 


NT-Pro-B Natriuret Pep   


 


Urine Color   


 


Urine Clarity   


 


Urine pH   


 


Ur Specific Gravity   


 


Urine Protein   


 


Urine Glucose (UA)   


 


Urine Ketones   


 


Urine Blood   


 


Urine Nitrate   


 


Urine Bilirubin   


 


Urine Urobilinogen   


 


Ur Leukocyte Esterase   


 


Urine RBC (Auto)   


 


Urine Microscopic WBC   


 


Ur Squamous Epith Cells   


 


Digoxin  < 0.4 L  














  04/11/18 04/11/18 04/12/18





  17:38 19:00 05:30


 


WBC    11.6 H


 


RBC    4.33


 


Hgb    13.0


 


Hct    38.7


 


MCV    89.4


 


MCH    30.0


 


MCHC    33.6


 


RDW    16.5 H


 


Plt Count    246


 


MPV   


 


Neut % (Auto)   


 


Lymph % (Auto)   


 


Mono % (Auto)   


 


Eos % (Auto)   


 


Baso % (Auto)   


 


Neut # (Auto)   


 


Lymph # (Auto)   


 


Mono # (Auto)   


 


Eos # (Auto)   


 


Baso # (Auto)   


 


Neutrophils % (Manual)   


 


Band Neutrophils %   


 


Lymphocytes % (Manual)   


 


Monocytes % (Manual)   


 


Platelet Estimate   


 


Large Platelets   


 


Giant Platelets   


 


Anisocytosis (manual)   


 


ESR   93 H 


 


PT   


 


INR   


 


APTT   


 


Sodium   


 


Potassium   


 


Chloride   


 


Carbon Dioxide   


 


Anion Gap   


 


BUN   


 


Creatinine   


 


Est GFR ( Amer)   


 


Est GFR (Non-Af Amer)   


 


Random Glucose   


 


Uric Acid   


 


Calcium   


 


NT-Pro-B Natriuret Pep   


 


Urine Color  Yellow  


 


Urine Clarity  Slighty-cloudy  


 


Urine pH  6.0  


 


Ur Specific Gravity  1.029  


 


Urine Protein  100  


 


Urine Glucose (UA)  150  


 


Urine Ketones  Trace  


 


Urine Blood  Moderate  


 


Urine Nitrate  Negative  


 


Urine Bilirubin  Negative  


 


Urine Urobilinogen  0.2-1.0  


 


Ur Leukocyte Esterase  Neg  


 


Urine RBC (Auto)  84 H  


 


Urine Microscopic WBC  7 H  


 


Ur Squamous Epith Cells  < 1  


 


Digoxin   














  04/12/18 04/12/18





  05:30 12:05


 


WBC  


 


RBC  


 


Hgb  


 


Hct  


 


MCV  


 


MCH  


 


MCHC  


 


RDW  


 


Plt Count  


 


MPV  


 


Neut % (Auto)  


 


Lymph % (Auto)  


 


Mono % (Auto)  


 


Eos % (Auto)  


 


Baso % (Auto)  


 


Neut # (Auto)  


 


Lymph # (Auto)  


 


Mono # (Auto)  


 


Eos # (Auto)  


 


Baso # (Auto)  


 


Neutrophils % (Manual)  


 


Band Neutrophils %  


 


Lymphocytes % (Manual)  


 


Monocytes % (Manual)  


 


Platelet Estimate  


 


Large Platelets  


 


Giant Platelets  


 


Anisocytosis (manual)  


 


ESR  


 


PT   13.8 H


 


INR   1.2


 


APTT   29.6


 


Sodium  145 


 


Potassium  4.0 


 


Chloride  102 


 


Carbon Dioxide  31 H 


 


Anion Gap  16 


 


BUN  17 


 


Creatinine  0.4 L 


 


Est GFR ( Amer)  > 60 


 


Est GFR (Non-Af Amer)  > 60 


 


Random Glucose  185 H 


 


Uric Acid  


 


Calcium  8.7 


 


NT-Pro-B Natriuret Pep  


 


Urine Color  


 


Urine Clarity  


 


Urine pH  


 


Ur Specific Gravity  


 


Urine Protein  


 


Urine Glucose (UA)  


 


Urine Ketones  


 


Urine Blood  


 


Urine Nitrate  


 


Urine Bilirubin  


 


Urine Urobilinogen  


 


Ur Leukocyte Esterase  


 


Urine RBC (Auto)  


 


Urine Microscopic WBC  


 


Ur Squamous Epith Cells  


 


Digoxin  








 Microbiology





04/11/18 01:20   Blood-Venous   Blood Culture - Preliminary


                            Staphylococcus Aureus


04/11/18 01:20   Blood-Venous   Gram Stain - Final


04/11/18 01:40   Blood-Venous   S.aureus & Coag-Neg Staph PNA FISH - Final


04/11/18 01:40   Blood-Venous   Blood Culture - Preliminary


                            Staphylococcus Aureus


04/11/18 01:40   Blood-Venous   Gram Stain - Final





 





 








Patient Name / ID : MIGDALIA CALI  / [id number]


Exam Date : 04/12/2018 11:34:57 ( Approved )


Study Comment : 


Sex / Age : F  / 62Y





Creator : Victoriano Healy MD


Dictator : Victoriano Healy MD


 : 


Approver : Victoriano Healy MD


Approver2 : 





Report Date : 04/12/2018 13:25:35


My Comment : 


********************************************************************************

***





PROCEDURE:  CHEST RADIOGRAPH, 1 VIEW





HISTORY:


OR





COMPARISON:


4/2/2018





FINDINGS:





LUNGS:


No infiltrate.  Vaguely nodular opacity lower left lung. Followup advised.





PLEURA:


No pneumothorax or pleural fluid seen.





CARDIOVASCULAR:


Normal.





OSSEOUS STRUCTURES:


No significant abnormalities.





VISUALIZED UPPER ABDOMEN:


Normal.





OTHER FINDINGS:


None. 





IMPRESSION:


Vaguely nodular opacity lower left lung common not evident prior examination 

and not evident on recent chest CT examination.  Follow-up advised with PA and 

lateral chest radiography when clinically feasible.  Otherwise unremarkable.





Assessment and Plan


(1) Back pain


Status: Acute   





(2) Atrial fib/flutter, transient


Status: Acute   





(3) Fever


Status: Acute   





(4) Bacteremia due to Staphylococcus aureus


Status: Acute   





- Assessment and Plan (Free Text)


Assessment: 





A/P-


62 year old female with CHF, A,fib, Multiple Myeloma with mets and undergoing 

chemo and radiation  admitted with severe back pian and urinary retention, low 

garde fever and mild leukocytosis.





afebrile today


leukocytosis trending down


blood cx- staph aureus x 2 


cxr- Left lower lobe nodular opacity as per report by radiologist not seen i n 

previous cxr.








plan-


continue with IV vancomycin for staph bacteremia.


keep vanco trough <20.


TTE r/o endocarditis.


source of staph bacteremia unclear at this time.


check 2 more blood cx .


check one from her port as well.


eventhough no mention of any epidural fluid collection or abscess on MRI report 

in light of staph bacteremia would need to r/o epidural abscess /spinal abscess 

.r/o vertebral OM as well in light of the MRI findings , high esr and staph 

bacteremia.


continue with empiric cefepime as well for now.





 all above d/w patient and she verbalizes full understanding of al above.





 all above also d/w the hospitalist.

## 2018-04-12 NOTE — CP.PCM.PN
Subjective





- Date & Time of Evaluation


Date of Evaluation: 04/12/18


Time of Evaluation: 08:58





- Subjective


Subjective: 





Pt seems to be much better today. She has almost no pain in the lower thoracic 

or lumbosacral area, but still has intense pain in the upper back area around 

T6 area. CBC and chemistries are more or less normal.


Have requested an anesthesiology consult for spinal tap and pain management.





Objective





- Vital Signs/Intake and Output


Vital Signs (last 24 hours): 


 











Temp Pulse Resp BP Pulse Ox


 


 98 F   91 H  18   124/74   95 


 


 04/12/18 07:49  04/12/18 07:49  04/12/18 07:49  04/12/18 07:49  04/12/18 07:49











- Medications


Medications: 


 Current Medications





Allopurinol (Zyloprim)  100 mg PO DAILY [facility]


Carvedilol (Coreg)  25 mg PO Q12 [facility]


   Last Admin: 04/11/18 21:29 Dose:  25 mg


Dexamethasone (Decadron Inj)  8 mg IVP Q8H [facility]


   Last Admin: 04/12/18 01:04 Dose:  8 mg


Digoxin (Digoxin)  0.125 mg PO DAILY [facility]


   Last Admin: 04/11/18 14:10 Dose:  0.125 mg


Famotidine (Pepcid)  20 mg PO DAILY [facility]


   Last Admin: 04/11/18 14:11 Dose:  20 mg


Furosemide (Lasix)  20 mg PO BID [facility]


   Last Admin: 04/11/18 17:23 Dose:  20 mg


Guaifenesin/Dextromethorphan (Robitussin Dm)  10 ml PO Q4 PRN


   PRN Reason: Cough


   Last Admin: 04/11/18 21:30 Dose:  10 ml


Home Med (Lenalidomide [Revlimid])  15 mg PO DAILY [facility]


   Last Admin: 04/11/18 17:36 Dose:  15 mg


Cefepime HCl 1 gm/ Sodium (Chloride)  100 mls @ 100 mls/hr IVPB Q8 [facility]


   PRN Reason: Protocol


   Last Admin: 04/12/18 01:03 Dose:  100 mls/hr


Vancomycin HCl 1 gm/ Sodium (Chloride)  250 mls @ 166.667 mls/hr IVPB DAILY [facility]


   PRN Reason: Protocol


Lisinopril (Zestril)  10 mg PO DAILY [facility]


   Last Admin: 04/11/18 14:12 Dose:  10 mg


Morphine Sulfate (Morphine)  2 mg IVP Q4 PRN


   PRN Reason: Pain, moderate (4-7)


   Last Admin: 04/11/18 14:00 Dose:  2 mg


Morphine Sulfate (Morphine)  5 mg IVP Q6H PRN


   PRN Reason: Pain, severe (8-10)


Potassium Chloride (K-Dur 20 Meq Er Tab)  20 meq PO BID [facility]


   Last Admin: 04/11/18 17:22 Dose:  20 meq


Pregabalin (Lyrica)  75 mg PO BID [facility]


   Last Admin: 04/11/18 17:53 Dose:  Not Given


Prochlorperazine (Compazine)  10 mg IVP Q8H PRN


   PRN Reason: Nausea/Vomiting











- Labs


Labs: 


 





 04/12/18 05:30 





 04/12/18 05:30

## 2018-04-12 NOTE — RAD
PROCEDURE:  CHEST RADIOGRAPH, 1 VIEW



HISTORY:

OR



COMPARISON:

4/2/2018



FINDINGS:



LUNGS:

No infiltrate.  Vaguely nodular opacity lower left lung. Followup 

advised.



PLEURA:

No pneumothorax or pleural fluid seen.



CARDIOVASCULAR:

Normal.



OSSEOUS STRUCTURES:

No significant abnormalities.



VISUALIZED UPPER ABDOMEN:

Normal.



OTHER FINDINGS:

None. 



IMPRESSION:

Vaguely nodular opacity lower left lung common not evident prior 

examination and not evident on recent chest CT examination.  

Follow-up advised with PA and lateral chest radiography when 

clinically feasible.  Otherwise unremarkable.

## 2018-04-13 LAB
BUN SERPL-MCNC: 18 MG/DL (ref 7–17)
CALCIUM SERPL-MCNC: 8.3 MG/DL (ref 8.4–10.2)
ERYTHROCYTE [DISTWIDTH] IN BLOOD BY AUTOMATED COUNT: 16.5 % (ref 11.5–14.5)
GFR NON-AFRICAN AMERICAN: > 60
HGB BLD-MCNC: 12.7 G/DL (ref 12–16)
MCH RBC QN AUTO: 29.3 PG (ref 27–31)
MCHC RBC AUTO-ENTMCNC: 32.4 G/DL (ref 33–37)
MCV RBC AUTO: 90.5 FL (ref 81–99)
PLATELET # BLD: 296 K/UL (ref 130–400)
RBC # BLD AUTO: 4.35 MIL/UL (ref 3.8–5.2)
WBC # BLD AUTO: 10 K/UL (ref 4.8–10.8)

## 2018-04-13 RX ADMIN — POTASSIUM CHLORIDE SCH MEQ: 20 TABLET, EXTENDED RELEASE ORAL at 09:01

## 2018-04-13 RX ADMIN — DEXAMETHASONE SODIUM PHOSPHATE SCH MG: 4 INJECTION, SOLUTION INTRAMUSCULAR; INTRAVENOUS at 02:20

## 2018-04-13 RX ADMIN — DEXAMETHASONE SODIUM PHOSPHATE SCH MG: 4 INJECTION, SOLUTION INTRAMUSCULAR; INTRAVENOUS at 09:01

## 2018-04-13 RX ADMIN — POTASSIUM CHLORIDE SCH MEQ: 20 TABLET, EXTENDED RELEASE ORAL at 17:11

## 2018-04-13 RX ADMIN — DEXAMETHASONE SODIUM PHOSPHATE SCH MG: 4 INJECTION, SOLUTION INTRAMUSCULAR; INTRAVENOUS at 17:11

## 2018-04-13 RX ADMIN — DIGOXIN SCH MG: 0.12 TABLET ORAL at 09:01

## 2018-04-13 NOTE — CARD
--------------- APPROVED REPORT --------------





EXAM: Two-dimensional and M-mode echocardiogram with Doppler and 

color Doppler.



Other Information 

Quality : GoodRhythm : Atrial Fibrillation



INDICATION

Infection:Subacute bacterial endocarditis 



2D DIMENSIONS 

IVSd1.22   (0.7-1.1cm)LVDd2.98   (3.9-5.9cm)

LVOT Diameter2.41   (1.8-2.4cm)PWd1.07   (0.7-1.1cm)

IVSs1.53   (0.8-1.2cm)LVDs3.00   (2.5-4.0cm)

FS (%) 0.4   %PWs1.19   (0.8-1.2cm)



M-Mode DIMENSIONS 

Left Atrium (MM)6.78   (2.5-4.0cm)IVSd0.99   (0.7-1.1cm)

Aortic Root3.28   (2.2-3.7cm)LVDd5.56   (4.0-5.6cm)

Aortic Cusp Exc.2.32   (1.5-2.0cm)PWd0.96   (0.7-1.1cm)

IVSs1.65   cmFS (%) 30   %

LVDs3.87   (2.0-3.8cm)PWs1.22   cm



Mitral Valve

E/A ratio0.0



TDI

E/Lateral E'0.0E/Medial E'0.0



 LEFT VENTRICLE 

The left ventricle is normal size.

There is normal left ventricular wall thickness.

Left ventricle systolic function is normal.

The Ejection Fraction is 55-60%.

There is normal LV segmental wall motion.

Pt in A Fib



 RIGHT VENTRICLE 

The right ventricle is normal size.

There is normal right ventricular wall thickness.

The right ventricular systolic function is normal.



 ATRIA 

The left atrium is severely dilated. 

The right atrium size is normal.



 AORTIC VALVE 

The aortic valve is mildly sclerotic.

No aortic regurgitation is present.

There is no aortic valvular stenosis. 



 MITRAL VALVE 

The mitral valve is normal in structure.

There is no evidence of mitral valve prolapse.

There is no mitral valve stenosis.

Mitral regurgitation is moderate.



 TRICUSPID VALVE 

The tricuspid valve is normal in structure.

There is no tricuspid valve regurgitation noted.



 PULMONIC VALVE 

The pulmonary valve is normal in structure.

There is no pulmonic valvular regurgitation. 



 GREAT VESSELS 

The aortic root is normal in size.

Due to poor image quality, the IVC could not be assessed.



 PERICARDIAL EFFUSION 

The pericardium appears normal.



<Conclusion>

Poor echo window with suboptimal images.

The left ventricle is normal size.

There is normal left ventricular wall thickness.

There is normal LV segmental wall motion.

Left ventricle systolic function is normal.

The Ejection Fraction is 55-60%.

The left atrium is severely dilated.

Mitral regurgitation is moderate.

No vegetations seen on this TTE

## 2018-04-13 NOTE — CP.PCM.PN
Subjective





- Date & Time of Evaluation


Date of Evaluation: 04/13/18


Time of Evaluation: 11:02





- Subjective


Subjective: 





Pt is afebrile, lower back pain much better, but she still c/o upper back pain, 

even though she is responding well to the analgesics. The pt's blood c/s showed 

2 samples positive for staph aureus.  An echocardiogram done does not show any 

vegetations. Because of the positive blood c/s, the Spinal tap was not done. We 

have to wait until her blood c/s are normal.


Meanwhile pt was able to slowly ambulate the bathroom with help. and is able to 

roll from side to side.


She is  on cefepine, and is afebrile at this time





Objective





- Vital Signs/Intake and Output


Vital Signs (last 24 hours): 


 











Temp Pulse Resp BP Pulse Ox


 


 98.3 F   97 H  18   121/70   95 


 


 04/13/18 08:03  04/13/18 09:03  04/13/18 08:03  04/13/18 09:03  04/13/18 08:03








Intake and Output: 


 











 04/13/18 04/13/18





 06:59 18:59


 


Intake Total  300


 


Output Total  1100


 


Balance  -800














- Medications


Medications: 


 Current Medications





Allopurinol (Zyloprim)  100 mg PO DAILY [facility]


   Last Admin: 04/13/18 09:04 Dose:  100 mg


Carvedilol (Coreg)  25 mg PO Q12 [facility]


   Last Admin: 04/13/18 09:00 Dose:  25 mg


Dexamethasone (Decadron Inj)  8 mg IVP Q8H [facility]


   Last Admin: 04/13/18 09:01 Dose:  8 mg


Digoxin (Digoxin)  0.125 mg PO DAILY [facility]


   Last Admin: 04/13/18 09:01 Dose:  0.125 mg


Docusate Sodium (Colace)  200 mg PO BID [facility]


   Last Admin: 04/13/18 09:00 Dose:  200 mg


Famotidine (Pepcid)  20 mg PO DAILY [facility]


   Last Admin: 04/13/18 09:03 Dose:  20 mg


Furosemide (Lasix)  20 mg PO BID [facility]


   Last Admin: 04/13/18 09:02 Dose:  20 mg


Guaifenesin/Dextromethorphan (Robitussin Dm)  10 ml PO Q4 [facility]N


   PRN Reason: Cough


   Last Admin: 04/11/18 21:30 Dose:  10 ml


Home Med (Lenalidomide [Revlimid])  15 mg PO DAILY [facility]


   Last Admin: 04/13/18 09:02 Dose:  15 mg


Cefepime HCl 1 gm/ Sodium (Chloride)  100 mls @ 100 mls/hr IVPB Q8 [facility]


   PRN Reason: Protocol


   Last Admin: 04/13/18 09:07 Dose:  100 mls/hr


Vancomycin HCl 1 gm/ Sodium (Chloride)  250 mls @ 166.667 mls/hr IVPB Q12 [facility]


   PRN Reason: Protocol


   Last Admin: 04/13/18 09:07 Dose:  166.667 mls/hr


Lisinopril (Zestril)  10 mg PO DAILY [facility]


   Last Admin: 04/13/18 09:03 Dose:  10 mg


Morphine Sulfate (Morphine)  2 mg IVP Q4 [facility]


   PRN Reason: Pain, moderate (4-7)


   Last Admin: 04/13/18 10:32 Dose:  2 mg


Morphine Sulfate (Morphine)  5 mg IVP Q6H [facility]


   PRN Reason: Pain, severe (8-10)


Potassium Chloride (K-Dur 20 Meq Er Tab)  20 meq PO BID [facility]


   Last Admin: 04/13/18 09:01 Dose:  20 meq


Pregabalin (Lyrica)  75 mg PO BID [facility]


   Last Admin: 04/13/18 09:06 Dose:  75 mg


Prochlorperazine (Compazine)  10 mg IVP Q8H [facility]


   PRN Reason: Nausea/Vomiting


Rivaroxaban (Xarelto)  20 mg PO DAILY [facility]


   PRN Reason: Protocol











- Labs


Labs: 


 





 04/13/18 05:56 





 04/13/18 05:56 





 











PT  13.8 Seconds (9.8-13.1)  H  04/12/18  12:05    


 


INR  1.2  (0.9-1.2)   04/12/18  12:05    


 


APTT  29.6 Seconds (25.6-37.1)   04/12/18  12:05

## 2018-04-13 NOTE — CP.PCM.PN
Subjective





- Date & Time of Evaluation


Date of Evaluation: 04/13/18


Time of Evaluation: 12:00





- Subjective


Subjective: 





No fever


Back pain better today


good appetite


no CP


no SOB


no cough


no abd pain


no diarrhea, no N/V





Objective





- Vital Signs/Intake and Output


Vital Signs (last 24 hours): 


 











Temp Pulse Resp BP Pulse Ox


 


 98.3 F   96 H  18   121/78   97 


 


 04/13/18 12:33  04/13/18 12:33  04/13/18 12:33  04/13/18 12:33  04/13/18 12:33








Intake and Output: 


 











 04/13/18 04/13/18





 06:59 18:59


 


Intake Total  300


 


Output Total  1100


 


Balance  -800














- Medications


Medications: 


 Current Medications





Allopurinol (Zyloprim)  100 mg PO DAILY [facility]


   Last Admin: 04/13/18 09:04 Dose:  100 mg


Carvedilol (Coreg)  25 mg PO Q12 [facility]


   Last Admin: 04/13/18 09:00 Dose:  25 mg


Dexamethasone (Decadron Inj)  8 mg IVP Q8H [facility]


   Last Admin: 04/13/18 09:01 Dose:  8 mg


Digoxin (Digoxin)  0.125 mg PO DAILY [facility]


   Last Admin: 04/13/18 09:01 Dose:  0.125 mg


Docusate Sodium (Colace)  200 mg PO BID [facility]


   Last Admin: 04/13/18 09:00 Dose:  200 mg


Famotidine (Pepcid)  20 mg PO DAILY [facility]


   Last Admin: 04/13/18 09:03 Dose:  20 mg


Furosemide (Lasix)  20 mg PO BID [facility]


   Last Admin: 04/13/18 09:02 Dose:  20 mg


Guaifenesin/Dextromethorphan (Robitussin Dm)  10 ml PO Q4 [facility]


   PRN Reason: Cough


   Last Admin: 04/11/18 21:30 Dose:  10 ml


Home Med (Lenalidomide [Revlimid])  15 mg PO DAILY [facility]


   Last Admin: 04/13/18 09:02 Dose:  15 mg


Cefepime HCl 1 gm/ Sodium (Chloride)  100 mls @ 100 mls/hr IVPB Q8 [facility]


   PRN Reason: Protocol


   Last Admin: 04/13/18 09:07 Dose:  100 mls/hr


Vancomycin HCl 1 gm/ Sodium (Chloride)  250 mls @ 166.667 mls/hr IVPB Q12 [facility]


   PRN Reason: Protocol


   Last Admin: 04/13/18 09:07 Dose:  166.667 mls/hr


Lisinopril (Zestril)  10 mg PO DAILY [facility]


   Last Admin: 04/13/18 09:03 Dose:  10 mg


Morphine Sulfate (Morphine)  2 mg IVP Q4 [facility]


   PRN Reason: Pain, moderate (4-7)


   Last Admin: 04/13/18 10:32 Dose:  2 mg


Morphine Sulfate (Morphine)  5 mg IVP Q6H [facility]


   PRN Reason: Pain, severe (8-10)


Potassium Chloride (K-Dur 20 Meq Er Tab)  20 meq PO BID [facility]


   Last Admin: 04/13/18 09:01 Dose:  20 meq


Pregabalin (Lyrica)  75 mg PO BID [facility]


   Last Admin: 04/13/18 09:06 Dose:  75 mg


Prochlorperazine (Compazine)  10 mg IVP Q8H [facility]


   PRN Reason: Nausea/Vomiting


Rivaroxaban (Xarelto)  20 mg PO DAILY [facility]


   PRN Reason: Protocol


   Last Admin: 04/13/18 12:20 Dose:  20 mg











- Labs


Labs: 


 





 04/13/18 05:56 





 04/13/18 05:56 





 











PT  13.8 Seconds (9.8-13.1)  H  04/12/18  12:05    


 


INR  1.2  (0.9-1.2)   04/12/18  12:05    


 


APTT  29.6 Seconds (25.6-37.1)   04/12/18  12:05    














- Constitutional


Appears: No Acute Distress





- Head Exam


Head Exam: ATRAUMATIC, NORMAL INSPECTION, NORMOCEPHALIC





- Eye Exam


Eye Exam: EOMI, Normal appearance


Pupil Exam: NORMAL ACCOMODATION





- ENT Exam


ENT Exam: Mucous Membranes Moist, Normal External Ear Exam





- Neck Exam


Neck Exam: Full ROM.  absent: Meningismus





- Respiratory Exam


Respiratory Exam: NORMAL BREATHING PATTERN.  absent: Rales, Wheezes, 

Respiratory Distress





- Cardiovascular Exam


Cardiovascular Exam: Irregular Rhythm, +S1, +S2





- GI/Abdominal Exam


GI & Abdominal Exam: Soft, Normal Bowel Sounds.  absent: Tenderness





- Extremities Exam


Extremities Exam: Normal Capillary Refill.  absent: Calf Tenderness, Pedal Edema





- Back Exam


Back Exam: paraspinal tenderness, vertebral tenderness.  absent: CVA tenderness 

(L), CVA tenderness (R)





- Neurological Exam


Neurological Exam: Alert, Awake, CN II-XII Intact, Oriented x3


Neuro motor strength exam: Left Upper Extremity: 5, Right Upper Extremity: 5, 

Left Lower Extremity: 5, Right Lower Extremity: 5





- Psychiatric Exam


Psychiatric exam: Normal Affect, Normal Mood





- Skin


Skin Exam: Dry, Normal Color, Warm





Assessment and Plan





- Assessment and Plan (Free Text)


Assessment: 





This is a 62 year old female with a past medical history significant for Atrial 

fibrillation anticoagulated on Xarelto, essential hypertension, Multiple myeloma

, and CHF, who presented to the ED complaining of worsening back pain. The 

patient has chronic back pain due to the multiple myeloma, however today the 

pain became much worse. In addition the patient was found to have urinary 

retention  x 1 day ,  prompting her to come to the ED. In the ED, the patient 

was found to be hemodynamically stable although in severe pain not relieved by 

Percocet. Sharma catheter was placed and drained large amount of urine.   

Patient had no motor or sensory deficits, no fecal or urinary incontinence, and 

no saddle anesthesia 





On 4/11, MRI of the thoracic/lumbar spine report showed no spinal cord 

compression, however it does show diffuse leptomeningeal thickening of the 

spinal cord suspicious for malignancy versus inflammation. There was originally 

concern for infection as well; however this is seen as much less likely as the 

patient has no hx of spinal surgery or signs of infection. As per discussion 

with consultants, they recommend the patient to have biopsy of the cord in 

order to ascertain if the thickening is due to metastatic disease versus 

inflammatory changes.  Neurosurgery was consulted ( Dr Javier- did not rec 

biopsy)





4/13: Biopsy unable to be obtained. Anesthesia consulted for Lumbar puncture 

instead for cytology for further evaluation of the leptominengeal thickening.  

However since pt's Blood c/s came back + for  Staph aureus, anesthesia did not 

do LP.





1) Severe acute on chronic back pain with urinary retention, spinal cord 

compression ruled out; however now with diffuse leptomeningeal thickening on 

MRI concerning for metastatic disease vs. inflammatory changes/ Infection


- Continue telemetry monitoring


- Decadron 8 mg IVP q 8 hours 


- Dr. ASHER Otto oncology consultant- recommends continuing steroids for now 


- Dr. Lynn, neurology on consultation


- Dr. Javier, neurosurgery- no workup


- Pain better today








2) Staph aureus in blood culture x 2 bottles- ? etiology 


unclear etiology - ? Epidural abscess


- Staph bacteremia


- Dr. Zarate on consultation


- Echocardiogram : no vegetation


- Continue Vancomycin  x  4 wks  as rec by ID


- will rpt Blood c/s - if neg , PICC line will be placed 


- Urine c/s : neg





3) Afib with RVR- now controlled


- Restarted Digoxin 0.125 mg po daily


- Restarted Coreg


- telemetry monitoring








4) Hypokalemia,- resolved


- continue po repletion








5) Essential htn


- chronic


- restarted home po medications





6) Multiple myeloma


- chronic


- Dr. ASHER Otto on consultation for heme/onc


- may need further chemotherapy if LP shows cytology consistent with mets





7. DVT prophylaxis


- will restart Xarelto

## 2018-04-13 NOTE — CP.PCM.PN
Subjective





- Date & Time of Evaluation


Date of Evaluation: 04/13/18


Time of Evaluation: 10:52





- Subjective


Subjective: 





ID Note-





Pt. seen and examined today.





denies any fever or chills.


still has some back pain.





no new events overnight.





Objective





- Vital Signs/Intake and Output


Vital Signs (last 24 hours): 


 











Temp Pulse Resp BP Pulse Ox


 


 98.3 F   97 H  18   121/70   95 


 


 04/13/18 08:03  04/13/18 09:03  04/13/18 08:03  04/13/18 09:03  04/13/18 08:03








Intake and Output: 


 











 04/13/18 04/13/18





 06:59 18:59


 


Intake Total  300


 


Output Total  1100


 


Balance  -800














- Medications


Medications: 


 Current Medications





Allopurinol (Zyloprim)  100 mg PO DAILY [facility]


   Last Admin: 04/13/18 09:04 Dose:  100 mg


Carvedilol (Coreg)  25 mg PO Q12 [facility]


   Last Admin: 04/13/18 09:00 Dose:  25 mg


Dexamethasone (Decadron Inj)  8 mg IVP Q8H [facility]


   Last Admin: 04/13/18 09:01 Dose:  8 mg


Digoxin (Digoxin)  0.125 mg PO DAILY [facility]


   Last Admin: 04/13/18 09:01 Dose:  0.125 mg


Docusate Sodium (Colace)  200 mg PO BID [facility]


   Last Admin: 04/13/18 09:00 Dose:  200 mg


Famotidine (Pepcid)  20 mg PO DAILY [facility]


   Last Admin: 04/13/18 09:03 Dose:  20 mg


Furosemide (Lasix)  20 mg PO BID [facility]


   Last Admin: 04/13/18 09:02 Dose:  20 mg


Guaifenesin/Dextromethorphan (Robitussin Dm)  10 ml PO Q4 PRN


   PRN Reason: Cough


   Last Admin: 04/11/18 21:30 Dose:  10 ml


Home Med (Lenalidomide [Revlimid])  15 mg PO DAILY [facility]


   Last Admin: 04/13/18 09:02 Dose:  15 mg


Cefepime HCl 1 gm/ Sodium (Chloride)  100 mls @ 100 mls/hr IVPB Q8 [facility]


   PRN Reason: Protocol


   Last Admin: 04/13/18 09:07 Dose:  100 mls/hr


Vancomycin HCl 1 gm/ Sodium (Chloride)  250 mls @ 166.667 mls/hr IVPB Q12 [facility]


   PRN Reason: Protocol


   Last Admin: 04/13/18 09:07 Dose:  166.667 mls/hr


Lisinopril (Zestril)  10 mg PO DAILY [facility]


   Last Admin: 04/13/18 09:03 Dose:  10 mg


Morphine Sulfate (Morphine)  2 mg IVP Q4 PRN


   PRN Reason: Pain, moderate (4-7)


   Last Admin: 04/13/18 10:32 Dose:  2 mg


Morphine Sulfate (Morphine)  5 mg IVP Q6H PRN


   PRN Reason: Pain, severe (8-10)


Potassium Chloride (K-Dur 20 Meq Er Tab)  20 meq PO BID [facility]


   Last Admin: 04/13/18 09:01 Dose:  20 meq


Pregabalin (Lyrica)  75 mg PO BID [facility]


   Last Admin: 04/13/18 09:06 Dose:  75 mg


Prochlorperazine (Compazine)  10 mg IVP Q8H PRN


   PRN Reason: Nausea/Vomiting


Rivaroxaban (Xarelto)  20 mg PO DAILY [facility]


   PRN Reason: Protocol











- Labs


Labs: 


 





 





- Additional Findings


Additional findings: 








- Constitutional


Appears: No Acute Distress





- Head Exam


Head Exam: ATRAUMATIC





- Eye Exam


Eye Exam: EOMI, PERRL





- ENT Exam


ENT Exam: Normal Oropharynx





- Neck Exam


Neck exam: Positive for: Full Rom





- Respiratory Exam


Respiratory Exam: Clear to Auscultation Bilateral, NORMAL BREATHING PATTERN





- Cardiovascular Exam


Cardiovascular Exam: Irregular Rhythm


Additional comments: 





Irregularly Irregular


rate controlled





- GI/Abdominal Exam


GI & Abdominal Exam: Normal Bowel Sounds, Soft


Additional comments: 





NT, ND





- Extremities Exam


Extremities exam: Positive for: normal inspection


Additional comments: 





no edema B/L LE





- Back Exam


Back exam: tenderness


Additional comments: 





positive tenderness in mid lower lumbar and thoracic region


no lesions


no erythema





- Neurological Exam


Neurological exam: Alert, Oriented x 3


 Laboratory Results - last 72 hr











  04/10/18 04/10/18 04/10/18





  12:05 14:21 14:21


 


WBC   


 


RBC   


 


Hgb   


 


Hct   


 


MCV   


 


MCH   


 


MCHC   


 


RDW   


 


Plt Count   


 


Neutrophils % (Manual)  85 H  


 


Band Neutrophils %  3 H  


 


Lymphocytes % (Manual)  4 L  


 


Monocytes % (Manual)  8  


 


Platelet Estimate  Normal  


 


Large Platelets  Present  


 


Giant Platelets  Present  


 


Anisocytosis (manual)  Slight  


 


ESR   


 


PT   


 


INR   


 


APTT   


 


Sodium   


 


Potassium   


 


Chloride   


 


Carbon Dioxide   


 


Anion Gap   


 


BUN   


 


Creatinine   


 


Est GFR ( Amer)   


 


Est GFR (Non-Af Amer)   


 


Random Glucose   


 


Calcium   


 


NT-Pro-B Natriuret Pep   898 


 


Procalcitonin   


 


Urine Color   


 


Urine Clarity   


 


Urine pH   


 


Ur Specific Gravity   


 


Urine Protein   


 


Urine Glucose (UA)   


 


Urine Ketones   


 


Urine Blood   


 


Urine Nitrate   


 


Urine Bilirubin   


 


Urine Urobilinogen   


 


Ur Leukocyte Esterase   


 


Urine RBC (Auto)   


 


Urine Microscopic WBC   


 


Ur Squamous Epith Cells   


 


Digoxin    < 0.4 L














  04/11/18 04/11/18 04/11/18





  04:25 04:25 17:38


 


WBC  15.4 H  


 


RBC  4.31  


 


Hgb  13.0  


 


Hct  38.5  


 


MCV  89.3  


 


MCH  30.2  


 


MCHC  33.8  


 


RDW  16.2 H  


 


Plt Count  212  


 


Neutrophils % (Manual)   


 


Band Neutrophils %   


 


Lymphocytes % (Manual)   


 


Monocytes % (Manual)   


 


Platelet Estimate   


 


Large Platelets   


 


Giant Platelets   


 


Anisocytosis (manual)   


 


ESR   


 


PT   


 


INR   


 


APTT   


 


Sodium   141 


 


Potassium   3.4 L 


 


Chloride   97 L 


 


Carbon Dioxide   28 


 


Anion Gap   19 


 


BUN   13 


 


Creatinine   0.4 L 


 


Est GFR ( Amer)   > 60 


 


Est GFR (Non-Af Amer)   > 60 


 


Random Glucose   173 H 


 


Calcium   8.7 


 


NT-Pro-B Natriuret Pep   


 


Procalcitonin   


 


Urine Color    Yellow


 


Urine Clarity    Slighty-cloudy


 


Urine pH    6.0


 


Ur Specific Gravity    1.029


 


Urine Protein    100


 


Urine Glucose (UA)    150


 


Urine Ketones    Trace


 


Urine Blood    Moderate


 


Urine Nitrate    Negative


 


Urine Bilirubin    Negative


 


Urine Urobilinogen    0.2-1.0


 


Ur Leukocyte Esterase    Neg


 


Urine RBC (Auto)    84 H


 


Urine Microscopic WBC    7 H


 


Ur Squamous Epith Cells    < 1


 


Digoxin   














  04/11/18 04/11/18 04/12/18





  18:44 19:00 05:30


 


WBC    11.6 H


 


RBC    4.33


 


Hgb    13.0


 


Hct    38.7


 


MCV    89.4


 


MCH    30.0


 


MCHC    33.6


 


RDW    16.5 H


 


Plt Count    246


 


Neutrophils % (Manual)   


 


Band Neutrophils %   


 


Lymphocytes % (Manual)   


 


Monocytes % (Manual)   


 


Platelet Estimate   


 


Large Platelets   


 


Giant Platelets   


 


Anisocytosis (manual)   


 


ESR   93 H 


 


PT   


 


INR   


 


APTT   


 


Sodium   


 


Potassium   


 


Chloride   


 


Carbon Dioxide   


 


Anion Gap   


 


BUN   


 


Creatinine   


 


Est GFR ( Amer)   


 


Est GFR (Non-Af Amer)   


 


Random Glucose   


 


Calcium   


 


NT-Pro-B Natriuret Pep   


 


Procalcitonin  0.41  


 


Urine Color   


 


Urine Clarity   


 


Urine pH   


 


Ur Specific Gravity   


 


Urine Protein   


 


Urine Glucose (UA)   


 


Urine Ketones   


 


Urine Blood   


 


Urine Nitrate   


 


Urine Bilirubin   


 


Urine Urobilinogen   


 


Ur Leukocyte Esterase   


 


Urine RBC (Auto)   


 


Urine Microscopic WBC   


 


Ur Squamous Epith Cells   


 


Digoxin   














  04/12/18 04/12/18 04/13/18





  05:30 12:05 05:56


 


WBC    10.0


 


RBC    4.35


 


Hgb    12.7


 


Hct    39.3


 


MCV    90.5


 


MCH    29.3


 


MCHC    32.4 L


 


RDW    16.5 H


 


Plt Count    296


 


Neutrophils % (Manual)   


 


Band Neutrophils %   


 


Lymphocytes % (Manual)   


 


Monocytes % (Manual)   


 


Platelet Estimate   


 


Large Platelets   


 


Giant Platelets   


 


Anisocytosis (manual)   


 


ESR   


 


PT   13.8 H 


 


INR   1.2 


 


APTT   29.6 


 


Sodium  145  


 


Potassium  4.0  


 


Chloride  102  


 


Carbon Dioxide  31 H  


 


Anion Gap  16  


 


BUN  17  


 


Creatinine  0.4 L  


 


Est GFR ( Amer)  > 60  


 


Est GFR (Non-Af Amer)  > 60  


 


Random Glucose  185 H  


 


Calcium  8.7  


 


NT-Pro-B Natriuret Pep   


 


Procalcitonin   


 


Urine Color   


 


Urine Clarity   


 


Urine pH   


 


Ur Specific Gravity   


 


Urine Protein   


 


Urine Glucose (UA)   


 


Urine Ketones   


 


Urine Blood   


 


Urine Nitrate   


 


Urine Bilirubin   


 


Urine Urobilinogen   


 


Ur Leukocyte Esterase   


 


Urine RBC (Auto)   


 


Urine Microscopic WBC   


 


Ur Squamous Epith Cells   


 


Digoxin   














  04/13/18





  05:56


 


WBC 


 


RBC 


 


Hgb 


 


Hct 


 


MCV 


 


MCH 


 


MCHC 


 


RDW 


 


Plt Count 


 


Neutrophils % (Manual) 


 


Band Neutrophils % 


 


Lymphocytes % (Manual) 


 


Monocytes % (Manual) 


 


Platelet Estimate 


 


Large Platelets 


 


Giant Platelets 


 


Anisocytosis (manual) 


 


ESR 


 


PT 


 


INR 


 


APTT 


 


Sodium  143


 


Potassium  4.2


 


Chloride  104


 


Carbon Dioxide  27


 


Anion Gap  16


 


BUN  18 H


 


Creatinine  0.4 L


 


Est GFR ( Amer)  > 60


 


Est GFR (Non-Af Amer)  > 60


 


Random Glucose  171 H


 


Calcium  8.3 L


 


NT-Pro-B Natriuret Pep 


 


Procalcitonin 


 


Urine Color 


 


Urine Clarity 


 


Urine pH 


 


Ur Specific Gravity 


 


Urine Protein 


 


Urine Glucose (UA) 


 


Urine Ketones 


 


Urine Blood 


 


Urine Nitrate 


 


Urine Bilirubin 


 


Urine Urobilinogen 


 


Ur Leukocyte Esterase 


 


Urine RBC (Auto) 


 


Urine Microscopic WBC 


 


Ur Squamous Epith Cells 


 


Digoxin 








 Microbiology





04/11/18 17:38   Urine,Sharma   Urine Culture - Final


                            No Growth (<1,000 CFU/ML)


04/11/18 01:20   Blood-Venous   Blood Culture - Final


                            Staphylococcus Aureus


04/11/18 01:20   Blood-Venous   Gram Stain - Final


04/11/18 01:40   Blood-Venous   S.aureus & Coag-Neg Staph PNA FISH - Final


04/11/18 01:40   Blood-Venous   Blood Culture - Final


                            Staphylococcus Aureus


04/11/18 01:40   Blood-Venous   Gram Stain - Final





 





 














Assessment and Plan


(1) Back pain


Status: Acute   





(2) Atrial fib/flutter, transient


Status: Acute   





(3) Fever


Status: Acute   





(4) Bacteremia due to Staphylococcus aureus


Status: Acute   





- Assessment and Plan (Free Text)


Assessment: 








A/P-


62 year old female with CHF, A,fib, Multiple Myeloma with mets and undergoing 

chemo and radiation  admitted with severe back pian and urinary retention, low 

garde fever and mild leukocytosis.





afebrile 


leukocytosis has resolved


blood cx- MSSA x 2


urine cx- neg


TTE- no vegetation as per report








plan-


continue with IV vancomycin for staph bacteremia.


keep vanco trough <20.


day #3 today.


check 2 more blood cx .


d/c cefepime today.


since TTE negative source of bacteremia could be the epidural region/ perhaps 

epidural abscess.


no LP as per anesthesia since pos blood cx.


hence pt. would be assumed to have epidural abscess based on MRI findings 

pending furthere results.


would need at least 3-4 weeks of IV abx for this MSSA bacteremia/ ? epidural 

abscess.





 all above d/w  the hospitalist.

## 2018-04-14 LAB
ALBUMIN SERPL-MCNC: 2.9 G/DL (ref 3.5–5)
ALBUMIN/GLOB SERPL: 0.9 {RATIO} (ref 1–2.1)
ALT SERPL-CCNC: 308 U/L (ref 9–52)
ANISOCYTOSIS BLD QL SMEAR: SLIGHT
AST SERPL-CCNC: 61 U/L (ref 14–36)
BASOPHILS # BLD AUTO: 0 K/UL (ref 0–0.2)
BASOPHILS NFR BLD: 0.2 % (ref 0–2)
BUN SERPL-MCNC: 15 MG/DL (ref 7–17)
CALCIUM SERPL-MCNC: 8.2 MG/DL (ref 8.4–10.2)
EOSINOPHIL # BLD AUTO: 0 K/UL (ref 0–0.7)
EOSINOPHIL NFR BLD: 0 % (ref 0–4)
ERYTHROCYTE [DISTWIDTH] IN BLOOD BY AUTOMATED COUNT: 16.1 % (ref 11.5–14.5)
GFR NON-AFRICAN AMERICAN: > 60
HGB BLD-MCNC: 12.9 G/DL (ref 12–16)
HYPOCHROMIC: SLIGHT
LYMPHOCYTE: 9 % (ref 20–50)
LYMPHOCYTES # BLD AUTO: 0.4 K/UL (ref 1–4.3)
LYMPHOCYTES NFR BLD AUTO: 4 % (ref 20–40)
MCH RBC QN AUTO: 29.7 PG (ref 27–31)
MCHC RBC AUTO-ENTMCNC: 33.3 G/DL (ref 33–37)
MCV RBC AUTO: 89.2 FL (ref 81–99)
MONOCYTE: 7 % (ref 0–10)
MONOCYTES # BLD: 0.7 K/UL (ref 0–0.8)
MONOCYTES NFR BLD: 7.9 % (ref 0–10)
NEUTROPHILS # BLD: 7.9 K/UL (ref 1.8–7)
NEUTROPHILS NFR BLD AUTO: 81 % (ref 42–75)
NEUTROPHILS NFR BLD AUTO: 87.9 % (ref 50–75)
NEUTS BAND NFR BLD: 3 % (ref 0–2)
NRBC BLD AUTO-RTO: 0 % (ref 0–0)
PLATELET # BLD EST: NORMAL 10*3/UL
PLATELET # BLD: 308 K/UL (ref 130–400)
PMV BLD AUTO: 9 FL (ref 7.2–11.7)
RBC # BLD AUTO: 4.35 MIL/UL (ref 3.8–5.2)
TOTAL CELLS COUNTED BLD: 100
WBC # BLD AUTO: 9 K/UL (ref 4.8–10.8)

## 2018-04-14 RX ADMIN — DEXAMETHASONE SODIUM PHOSPHATE SCH MG: 4 INJECTION, SOLUTION INTRAMUSCULAR; INTRAVENOUS at 17:33

## 2018-04-14 RX ADMIN — POTASSIUM CHLORIDE SCH MEQ: 20 TABLET, EXTENDED RELEASE ORAL at 17:33

## 2018-04-14 RX ADMIN — DIGOXIN SCH MG: 0.12 TABLET ORAL at 08:53

## 2018-04-14 RX ADMIN — DEXAMETHASONE SODIUM PHOSPHATE SCH MG: 4 INJECTION, SOLUTION INTRAMUSCULAR; INTRAVENOUS at 01:19

## 2018-04-14 RX ADMIN — POTASSIUM CHLORIDE SCH MEQ: 20 TABLET, EXTENDED RELEASE ORAL at 08:53

## 2018-04-14 RX ADMIN — DEXAMETHASONE SODIUM PHOSPHATE SCH MG: 4 INJECTION, SOLUTION INTRAMUSCULAR; INTRAVENOUS at 08:53

## 2018-04-14 NOTE — CARD
--------------- APPROVED REPORT --------------





EKG Measurement

Heart [id number]

[id number]

[id number]

[id number]



<Conclusion>

Atrial fibrillation

Nonspecific T wave abnormality

Abnormal ECG

## 2018-04-14 NOTE — CP.PCM.PN
Subjective





- Date & Time of Evaluation


Date of Evaluation: 04/14/18


Time of Evaluation: 08:19





- Subjective


Subjective: 





Pt continues to improve. Her pain in the upper back is only when she turns, 

otherwise she is much better. She will continue the antibiotics for 3 more 

weeks. Pt's block catheter was removed, and she is urinating on her own. Will 

discuss with neurologist and start decreasing the dose of steroids.





Objective





- Vital Signs/Intake and Output


Vital Signs (last 24 hours): 


 











Temp Pulse Resp BP Pulse Ox


 


 97.9 F   88   18   144/88   98 


 


 04/14/18 05:02  04/14/18 05:02  04/14/18 05:02  04/14/18 05:02  04/14/18 05:02











- Medications


Medications: 


 Current Medications





Allopurinol (Zyloprim)  100 mg PO DAILY [facility]


   Last Admin: 04/13/18 09:04 Dose:  100 mg


Carvedilol (Coreg)  25 mg PO Q12 [facility]


   Last Admin: 04/13/18 20:34 Dose:  25 mg


Dexamethasone (Decadron Inj)  8 mg IVP Q8H [facility]


   Last Admin: 04/14/18 01:19 Dose:  8 mg


Digoxin (Digoxin)  0.125 mg PO DAILY [facility]


   Last Admin: 04/13/18 09:01 Dose:  0.125 mg


Docusate Sodium (Colace)  200 mg PO BID [facility]


   Last Admin: 04/13/18 17:11 Dose:  200 mg


Famotidine (Pepcid)  20 mg PO DAILY [facility]


   Last Admin: 04/13/18 09:03 Dose:  20 mg


Furosemide (Lasix)  20 mg PO BID [facility]


   Last Admin: 04/13/18 17:11 Dose:  20 mg


Guaifenesin/Dextromethorphan (Robitussin Dm)  10 ml PO Q4 PRN


   PRN Reason: Cough


   Last Admin: 04/11/18 21:30 Dose:  10 ml


Home Med (Lenalidomide [Revlimid])  15 mg PO DAILY [facility]


   Last Admin: 04/13/18 09:02 Dose:  15 mg


Vancomycin HCl 1 gm/ Sodium (Chloride)  250 mls @ 166.667 mls/hr IVPB Q12 [facility]


   PRN Reason: Protocol


   Last Admin: 04/13/18 20:34 Dose:  166.667 mls/hr


Lisinopril (Zestril)  10 mg PO DAILY [facility]


   Last Admin: 04/13/18 09:03 Dose:  10 mg


Morphine Sulfate (Morphine)  2 mg IVP Q4 PRN


   PRN Reason: Pain, moderate (4-7)


   Last Admin: 04/13/18 10:32 Dose:  2 mg


Morphine Sulfate (Morphine)  5 mg IVP Q6H [facility]


   PRN Reason: Pain, severe (8-10)


Potassium Chloride (K-Dur 20 Meq Er Tab)  20 meq PO BID [facility]


   Last Admin: 04/13/18 17:11 Dose:  20 meq


Pregabalin (Lyrica)  75 mg PO BID [facility]


   Last Admin: 04/13/18 17:15 Dose:  75 mg


Prochlorperazine (Compazine)  10 mg IVP Q8H [facility]


   PRN Reason: Nausea/Vomiting


Rivaroxaban (Xarelto)  20 mg PO DAILY [facility]


   PRN Reason: Protocol


   Last Admin: 04/13/18 12:20 Dose:  20 mg











- Labs


Labs: 


 





 04/13/18 05:56 





 04/13/18 05:56 





 











PT  13.8 Seconds (9.8-13.1)  H  04/12/18  12:05    


 


INR  1.2  (0.9-1.2)   04/12/18  12:05    


 


APTT  29.6 Seconds (25.6-37.1)   04/12/18  12:05

## 2018-04-14 NOTE — CP.PCM.PN
Subjective





- Date & Time of Evaluation


Date of Evaluation: 04/14/18


Time of Evaluation: 11:00





- Subjective


Subjective: 





Patient seen and examined. Admitted feeling with pain more tolerable





Objective





- Vital Signs/Intake and Output


Vital Signs (last 24 hours): 


 











Temp Pulse Resp BP Pulse Ox


 


 97.9 F   84   20   117/76   95 


 


 04/14/18 12:50  04/14/18 12:50  04/14/18 12:50  04/14/18 12:50  04/14/18 12:50











- Medications


Medications: 


 Current Medications





Allopurinol (Zyloprim)  100 mg PO DAILY [facility]


   Last Admin: 04/14/18 08:55 Dose:  100 mg


Carvedilol (Coreg)  25 mg PO Q12 [facility]


   Last Admin: 04/14/18 08:52 Dose:  25 mg


Dexamethasone (Decadron Inj)  8 mg IVP Q8H [facility]


   Last Admin: 04/14/18 08:53 Dose:  8 mg


Digoxin (Digoxin)  0.125 mg PO DAILY [facility]


   Last Admin: 04/14/18 08:53 Dose:  0.125 mg


Docusate Sodium (Colace)  200 mg PO BID [facility]


   Last Admin: 04/14/18 08:52 Dose:  200 mg


Famotidine (Pepcid)  20 mg PO DAILY [facility]


   Last Admin: 04/14/18 08:54 Dose:  20 mg


Furosemide (Lasix)  20 mg PO BID [facility]


   Last Admin: 04/14/18 08:54 Dose:  20 mg


Guaifenesin/Dextromethorphan (Robitussin Dm)  10 ml PO Q4 PRN


   PRN Reason: Cough


   Last Admin: 04/11/18 21:30 Dose:  10 ml


Home Med (Lenalidomide [Revlimid])  15 mg PO DAILY [facility]


   Last Admin: 04/14/18 08:54 Dose:  15 mg


Vancomycin HCl 1 gm/ Sodium (Chloride)  250 mls @ 166.667 mls/hr IVPB Q12 [facility]


   PRN Reason: Protocol


   Last Admin: 04/14/18 08:58 Dose:  166.667 mls/hr


Lisinopril (Zestril)  10 mg PO DAILY [facility]


   Last Admin: 04/14/18 08:54 Dose:  10 mg


Morphine Sulfate (Morphine)  2 mg IVP Q4 PRN


   PRN Reason: Pain, moderate (4-7)


   Last Admin: 04/14/18 09:54 Dose:  2 mg


Morphine Sulfate (Morphine)  5 mg IVP Q6H PRN


   PRN Reason: Pain, severe (8-10)


Potassium Chloride (K-Dur 20 Meq Er Tab)  20 meq PO BID [facility]


   Last Admin: 04/14/18 08:53 Dose:  20 meq


Prochlorperazine (Compazine)  10 mg IVP Q8H [facility]


   PRN Reason: Nausea/Vomiting


Rivaroxaban (Xarelto)  20 mg PO DAILY [facility]


   PRN Reason: Protocol


   Last Admin: 04/14/18 08:54 Dose:  20 mg











- Labs


Labs: 


 





 04/14/18 06:12 





 04/14/18 06:12 





 











PT  13.8 Seconds (9.8-13.1)  H  04/12/18  12:05    


 


INR  1.2  (0.9-1.2)   04/12/18  12:05    


 


APTT  29.6 Seconds (25.6-37.1)   04/12/18  12:05    














- Constitutional


Appears: No Acute Distress





- Head Exam


Head Exam: ATRAUMATIC





- Eye Exam


Eye Exam: absent: Scleral icterus





- ENT Exam


ENT Exam: Mucous Membranes Moist





- Neck Exam


Neck Exam: absent: Meningismus





- Respiratory Exam


Respiratory Exam: absent: Rales, Rhonchi, Wheezes, Respiratory Distress





- Cardiovascular Exam


Cardiovascular Exam: Irregular Rhythm





- GI/Abdominal Exam


GI & Abdominal Exam: Soft.  absent: Tenderness





- Rectal Exam


Rectal Exam: Deferred





- Extremities Exam


Extremities Exam: absent: Calf Tenderness, Pedal Edema





- Back Exam


Back Exam: absent: tenderness





- Neurological Exam


Neurological Exam: Alert, Oriented x3





- Psychiatric Exam


Psychiatric exam: Normal Affect





- Skin


Skin Exam: Dry, Intact





Assessment and Plan





- Assessment and Plan (Free Text)


Assessment: 





63 yo female with history of AFib, HTN, Multiple Myeloma with Chronic Back Pain 

and CHF came in because of worsening back pain. MRI of the thoraco-lumbar spine 

was negative for cord compression but showed diffuse leptomeningeal thickening 

of the spinal cord. Metastasis or inflammation were entertained so biopsy was 

recommended. Neurosurgery consult however did not recommend biopsy but instead 

recommended cytology of the CSF. Blood culture grew Staph aureus and patient 

was put on IV Vanco. Lumbar tap was held because of Staph bacteremia and would 

have to wait until the bacteremia is resolved.








1.  Acute on chronic back pain


MRI showed leptomeningeal thickening


lumbar tap for CSF cytology on hold until bacteremia is cleared


Decadron 8 mg IVP q 8 hours


patient had better relief from pain and has been moving around








2. Staph aureus Bacteremia


ID consult with Dr Zarate


Echocardiogram showed no vegetation


Continue IV Vancomycin for a total of 4 wks 


follow up blood culture: negative for growth


PICC line possibly by Monday








3. Afib 


rate controlled


continue Digoxin and Coreg


continue Xarelto








4. HTN


BP stable


continue Coreg, Lisinopril and Lasix








5. Multiple myeloma


Dr Daniel on oncology consult








6. DVT prophylaxis


on Xarelto

## 2018-04-15 RX ADMIN — POTASSIUM CHLORIDE SCH MEQ: 20 TABLET, EXTENDED RELEASE ORAL at 08:40

## 2018-04-15 RX ADMIN — POTASSIUM CHLORIDE SCH MEQ: 20 TABLET, EXTENDED RELEASE ORAL at 17:35

## 2018-04-15 RX ADMIN — DEXAMETHASONE SODIUM PHOSPHATE SCH MG: 4 INJECTION, SOLUTION INTRAMUSCULAR; INTRAVENOUS at 09:57

## 2018-04-15 RX ADMIN — DEXAMETHASONE SODIUM PHOSPHATE SCH MG: 4 INJECTION, SOLUTION INTRAMUSCULAR; INTRAVENOUS at 01:41

## 2018-04-15 RX ADMIN — DEXAMETHASONE SODIUM PHOSPHATE SCH MG: 4 INJECTION, SOLUTION INTRAMUSCULAR; INTRAVENOUS at 17:36

## 2018-04-15 RX ADMIN — DIGOXIN SCH MG: 0.12 TABLET ORAL at 08:41

## 2018-04-15 NOTE — CP.PCM.PN
Subjective





- Date & Time of Evaluation


Date of Evaluation: 04/15/18


Time of Evaluation: 09:26





- Subjective


Subjective: 





Pt is feekling much better, even though she still has pain off and on in the 

right side of the upper back. 


CBC is stable


Her chemistries show a marked increase in the liver enzymes. Her Bilirubin is 

normal but the AST, ALT, and ALK are inocencia much increased..Will do a abdominal 

ultrasound. tomorrow, and discuss with ID and Neurology if all meds can be 

continued,





Objective





- Vital Signs/Intake and Output


Vital Signs (last 24 hours): 


 











Temp Pulse Resp BP Pulse Ox


 


 98.2 F   80   18   128/85   96 


 


 04/15/18 08:00  04/15/18 08:42  04/15/18 08:00  04/15/18 08:42  04/15/18 08:00











- Medications


Medications: 


 Current Medications





Allopurinol (Zyloprim)  100 mg PO DAILY [facility]


   Last Admin: 04/15/18 08:42 Dose:  100 mg


Carvedilol (Coreg)  25 mg PO Q12 [facility]


   Last Admin: 04/15/18 08:40 Dose:  25 mg


Dexamethasone (Decadron Inj)  8 mg IVP Q8H [facility]


   Last Admin: 04/15/18 01:41 Dose:  8 mg


Digoxin (Digoxin)  0.125 mg PO DAILY [facility]


   Last Admin: 04/15/18 08:41 Dose:  0.125 mg


Docusate Sodium (Colace)  200 mg PO BID [facility]


   Last Admin: 04/15/18 08:41 Dose:  200 mg


Famotidine (Pepcid)  20 mg PO DAILY [facility]


   Last Admin: 04/15/18 08:39 Dose:  20 mg


Furosemide (Lasix)  20 mg PO BID [facility]


   Last Admin: 04/15/18 08:40 Dose:  20 mg


Guaifenesin/Dextromethorphan (Robitussin Dm)  10 ml PO Q4 PRN


   PRN Reason: Cough


   Last Admin: 04/11/18 21:30 Dose:  10 ml


Home Med (Lenalidomide [Revlimid])  15 mg PO DAILY [facility]


   Last Admin: 04/15/18 08:40 Dose:  15 mg


Vancomycin HCl 1 gm/ Sodium (Chloride)  250 mls @ 166.667 mls/hr IVPB Q12 [facility]


   PRN Reason: Protocol


   Last Admin: 04/14/18 21:35 Dose:  166.667 mls/hr


Lisinopril (Zestril)  10 mg PO DAILY [facility]


   Last Admin: 04/15/18 08:42 Dose:  10 mg


Potassium Chloride (K-Dur 20 Meq Er Tab)  20 meq PO BID [facility]


   Last Admin: 04/15/18 08:40 Dose:  20 meq


Pregabalin (Lyrica)  75 mg PO BID [facility]


   Last Admin: 04/15/18 08:35 Dose:  75 mg


Prochlorperazine (Compazine)  10 mg IVP Q8H [facility]


   PRN Reason: Nausea/Vomiting


Rivaroxaban (Xarelto)  20 mg PO DAILY [facility]


   PRN Reason: Protocol


   Last Admin: 04/15/18 08:36 Dose:  20 mg











- Labs


Labs: 


 





 04/14/18 06:12 





 04/14/18 06:12 





 











PT  13.8 Seconds (9.8-13.1)  H  04/12/18  12:05    


 


INR  1.2  (0.9-1.2)   04/12/18  12:05    


 


APTT  29.6 Seconds (25.6-37.1)   04/12/18  12:05

## 2018-04-15 NOTE — CP.PCM.PN
Subjective





- Date & Time of Evaluation


Date of Evaluation: 04/15/18


Time of Evaluation: 11:30





- Subjective


Subjective: 





Patient seen and examined. Claimed she is able to move around with minimal back 

pain





Objective





- Vital Signs/Intake and Output


Vital Signs (last 24 hours): 


 











Temp Pulse Resp BP Pulse Ox


 


 97.8 F   82   18   127/83   96 


 


 04/15/18 12:00  04/15/18 12:00  04/15/18 12:00  04/15/18 12:00  04/15/18 12:00











- Medications


Medications: 


 Current Medications





Allopurinol (Zyloprim)  100 mg PO DAILY [facility]


   Last Admin: 04/15/18 08:42 Dose:  100 mg


Carvedilol (Coreg)  25 mg PO Q12 [facility]


   Last Admin: 04/15/18 08:40 Dose:  25 mg


Dexamethasone (Decadron Inj)  8 mg IVP Q8H [facility]


   Last Admin: 04/15/18 09:57 Dose:  8 mg


Digoxin (Digoxin)  0.125 mg PO DAILY [facility]


   Last Admin: 04/15/18 08:41 Dose:  0.125 mg


Docusate Sodium (Colace)  200 mg PO BID [facility]


   Last Admin: 04/15/18 08:41 Dose:  200 mg


Famotidine (Pepcid)  20 mg PO DAILY [facility]


   Last Admin: 04/15/18 08:39 Dose:  20 mg


Furosemide (Lasix)  20 mg PO BID [facility]


   Last Admin: 04/15/18 08:40 Dose:  20 mg


Guaifenesin/Dextromethorphan (Robitussin Dm)  10 ml PO Q4 PRN


   PRN Reason: Cough


   Last Admin: 04/11/18 21:30 Dose:  10 ml


Home Med (Lenalidomide [Revlimid])  15 mg PO DAILY [facility]


   Last Admin: 04/15/18 08:40 Dose:  15 mg


Vancomycin HCl 1 gm/ Sodium (Chloride)  250 mls @ 166.667 mls/hr IVPB Q12 [facility]


   PRN Reason: Protocol


   Last Admin: 04/15/18 09:55 Dose:  166.667 mls/hr


Lisinopril (Zestril)  10 mg PO DAILY [facility]


   Last Admin: 04/15/18 08:42 Dose:  10 mg


Potassium Chloride (K-Dur 20 Meq Er Tab)  20 meq PO BID [facility]


   Last Admin: 04/15/18 08:40 Dose:  20 meq


Pregabalin (Lyrica)  75 mg PO BID [facility]


   Last Admin: 04/15/18 08:35 Dose:  75 mg


Prochlorperazine (Compazine)  10 mg IVP Q8H PRN


   PRN Reason: Nausea/Vomiting


Rivaroxaban (Xarelto)  20 mg PO DAILY [facility]


   PRN Reason: Protocol


   Last Admin: 04/15/18 08:36 Dose:  20 mg











- Labs


Labs: 


 





 04/14/18 06:12 





 04/14/18 06:12 





 











PT  13.8 Seconds (9.8-13.1)  H  04/12/18  12:05    


 


INR  1.2  (0.9-1.2)   04/12/18  12:05    


 


APTT  29.6 Seconds (25.6-37.1)   04/12/18  12:05    














- Constitutional


Appears: No Acute Distress





- Head Exam


Head Exam: ATRAUMATIC





- Eye Exam


Eye Exam: absent: Scleral icterus





- ENT Exam


ENT Exam: Mucous Membranes Moist





- Neck Exam


Neck Exam: absent: Meningismus





- Respiratory Exam


Respiratory Exam: absent: Rales, Rhonchi, Wheezes, Respiratory Distress





- Cardiovascular Exam


Cardiovascular Exam: Irregular Rhythm





- GI/Abdominal Exam


GI & Abdominal Exam: Soft.  absent: Tenderness





- Rectal Exam


Rectal Exam: Deferred





- Extremities Exam


Extremities Exam: absent: Calf Tenderness, Pedal Edema





- Back Exam


Back Exam: absent: tenderness





- Neurological Exam


Neurological Exam: Alert, Oriented x3





- Psychiatric Exam


Psychiatric exam: Normal Affect





- Skin


Skin Exam: Dry, Intact





Assessment and Plan





- Assessment and Plan (Free Text)


Assessment: 





63 yo female with history of AFib, HTN, Multiple Myeloma with Chronic Back Pain 

and CHF came in because of worsening back pain. MRI of the thoraco-lumbar spine 

was negative for cord compression but showed diffuse leptomeningeal thickening 

of the spinal cord. Metastasis or inflammation were entertained so biopsy was 

recommended. Neurosurgery consult however did not recommend biopsy but instead 

recommended cytology of the CSF. Blood culture grew Staph aureus and patient 

was put on IV Vanco. Lumbar tap was held because of Staph bacteremia and would 

have to wait until the bacteremia is resolved.








1.  Acute on chronic back pain


MRI showed leptomeningeal thickening


lumbar tap for CSF cytology on hold until bacteremia is cleared


Decadron 8 mg IVP q 8 hours


patient had better relief from pain and has been moving around








2. Staph aureus Bacteremia


ID consult with Dr Zarate


Echocardiogram showed no vegetation


Continue IV Vancomycin 1gm q 12hrs for a total of 4 wks 


subsequent blood culture: no growth


PICC line on Monday








3. Afib 


rate controlled


continue Digoxin and Coreg


continue Xarelto








4. HTN


BP stable


continue Coreg, Lisinopril and Lasix








5. Multiple myeloma


Dr Daniel on oncology consult








6. elevated LFTs


maybe secondary to Xarelto which can induced hepatitis and elevated ALT or 

liver mets


abdominal sonogram


repeat LFT








7. DVT prophylaxis


on Xarelto

## 2018-04-16 RX ADMIN — OXYCODONE HYDROCHLORIDE AND ACETAMINOPHEN PRN TAB: 5; 325 TABLET ORAL at 21:05

## 2018-04-16 RX ADMIN — DEXAMETHASONE SODIUM PHOSPHATE SCH MG: 4 INJECTION, SOLUTION INTRAMUSCULAR; INTRAVENOUS at 01:03

## 2018-04-16 RX ADMIN — POTASSIUM CHLORIDE SCH MEQ: 20 TABLET, EXTENDED RELEASE ORAL at 08:34

## 2018-04-16 RX ADMIN — DEXAMETHASONE SODIUM PHOSPHATE SCH MG: 4 INJECTION, SOLUTION INTRAMUSCULAR; INTRAVENOUS at 23:03

## 2018-04-16 RX ADMIN — POTASSIUM CHLORIDE SCH MEQ: 20 TABLET, EXTENDED RELEASE ORAL at 16:56

## 2018-04-16 RX ADMIN — DEXAMETHASONE SODIUM PHOSPHATE SCH MG: 4 INJECTION, SOLUTION INTRAMUSCULAR; INTRAVENOUS at 16:56

## 2018-04-16 RX ADMIN — DIGOXIN SCH MG: 0.12 TABLET ORAL at 08:32

## 2018-04-16 NOTE — CP.PCM.PN
Subjective





- Date & Time of Evaluation


Date of Evaluation: 04/16/18


Time of Evaluation: 14:33





- Subjective


Subjective: 





ID note-





Pt. seen and examined today.


pt. in good spirits and smiling and states feels better and that her back pain 

is less.





got call from oncologist  who states pt. needs to resume her chemo for 

her MM and as per  this particular regimen should not cause neutropenia.





Objective





- Vital Signs/Intake and Output


Vital Signs (last 24 hours): 


 











Temp Pulse Resp BP Pulse Ox


 


 97.7 F   93 H  18   98/62 L  97 


 


 04/16/18 12:44  04/16/18 12:44  04/16/18 12:44  04/16/18 12:44  04/16/18 12:44








Intake and Output: 


 











 04/16/18 04/16/18





 06:59 18:59


 


Intake Total 990 


 


Balance 990 














- Medications


Medications: 


 Current Medications





Allopurinol (Zyloprim)  [facility] mg [facility]ILY [facility]


   Last Admin: 04/16/18 08:32 Dose:  100 mg


Carvedilol (Coreg)  25 mg PO Q12 [facility]


   Last Admin: 04/16/18 08:32 Dose:  25 mg


Digoxin (Digoxin)  0.125 mg PO DAILY [facility]


   Last Admin: 04/16/18 08:32 Dose:  0.125 mg


Docusate Sodium (Colace)  200 mg PO BID [facility]


   Last Admin: 04/16/18 08:33 Dose:  200 mg


Famotidine (Pepcid)  20 mg PO DAILY [facility]


   Last Admin: 04/16/18 08:31 Dose:  20 mg


Furosemide (Lasix)  20 mg PO BID [facility]


   Last Admin: 04/16/18 08:34 Dose:  20 mg


Guaifenesin/Dextromethorphan (Robitussin Dm)  10 ml PO Q4 PRN


   PRN Reason: Cough


   Last Admin: 04/11/18 21:30 Dose:  10 ml


Home Med (Lenalidomide [Revlimid])  15 mg PO DAILY [facility]


   Last Admin: 04/16/18 08:33 Dose:  15 mg


Vancomycin HCl 1 gm/ Sodium (Chloride)  250 mls @ 166.667 mls/hr IVPB Q12 [facility]


   PRN Reason: Protocol


   Last Admin: 04/16/18 08:38 Dose:  166.667 mls/hr


Carfilzomib 54 mg/ Dextrose  50 mls @ 100 mls/hr IV ONCE ONE


   PRN Reason: As Directed


   Stop: 04/16/18 15:14


Lisinopril (Zestril)  10 mg PO DAILY [facility]


   Last Admin: 04/16/18 08:33 Dose:  10 mg


Morphine Sulfate (Morphine)  2 mg IVP Q4 PRN


   PRN Reason: Pain, moderate (4-7)


   Last Admin: 04/16/18 09:32 Dose:  2 mg


Potassium Chloride (K-Dur 20 Meq Er Tab)  20 meq PO BID [facility]


   Last Admin: 04/16/18 08:34 Dose:  20 meq


Pregabalin (Lyrica)  75 mg PO BID [facility]


   Last Admin: 04/16/18 11:53 Dose:  Not Given


Prochlorperazine (Compazine)  10 mg IVP Q8H [facility]


   PRN Reason: Nausea/Vomiting


Rivaroxaban (Xarelto)  20 mg PO DAILY [facility]


   PRN Reason: Protocol


   Last Admin: 04/16/18 08:32 Dose:  20 mg











- Labs


Labs: 


 





 





- Additional Findings


Additional findings: 








- Constitutional


Appears: No Acute Distress





- Head Exam


Head Exam: ATRAUMATIC





- Eye Exam


Eye Exam: EOMI, PERRL





- ENT Exam


ENT Exam: Normal Oropharynx





- Neck Exam


Neck exam: Positive for: Full Rom





- Respiratory Exam


Respiratory Exam: Clear to Auscultation Bilateral, NORMAL BREATHING PATTERN





- Cardiovascular Exam


Cardiovascular Exam: Irregular Rhythm


Additional comments: 





Irregularly Irregular


rate controlled





- GI/Abdominal Exam


GI & Abdominal Exam: Normal Bowel Sounds, Soft


Additional comments: 





NT, ND





- Extremities Exam


Extremities exam: Positive for: normal inspection


Additional comments: 





no edema B/L LE





- Back Exam


Back exam: tenderness


Additional comments: 





positive tenderness in mid lower lumbar and thoracic region


no lesions


no erythema





- Neurological Exam


Neurological exam: Alert, Oriented x 3





 Laboratory Results - last 72 hr











  04/14/18 04/14/18 04/14/18





  06:12 06:12 06:12


 


WBC   9.0 


 


RBC   4.35 


 


Hgb   12.9 


 


Hct   38.8 


 


MCV   89.2 


 


MCH   29.7 


 


MCHC   33.3 


 


RDW   16.1 H 


 


Plt Count   308 


 


MPV   9.0 


 


Neut % (Auto)   87.9 H 


 


Lymph % (Auto)   4.0 L 


 


Mono % (Auto)   7.9 


 


Eos % (Auto)   0.0 


 


Baso % (Auto)   0.2 


 


Neut # (Auto)   7.9 H 


 


Lymph # (Auto)   0.4 L 


 


Mono # (Auto)   0.7 


 


Eos # (Auto)   0.0 


 


Baso # (Auto)   0.0 


 


Neutrophils % (Manual)   81 H 


 


Band Neutrophils %   3 H 


 


Lymphocytes % (Manual)   9 L 


 


Monocytes % (Manual)   7 


 


Platelet Estimate   Normal 


 


Hypochromasia (manual)   Slight 


 


Anisocytosis (manual)   Slight 


 


Sodium    141


 


Potassium    4.2


 


Chloride    103


 


Carbon Dioxide    24


 


Anion Gap    18


 


BUN    15


 


Creatinine    0.4 L


 


Est GFR ( Amer)    > 60


 


Est GFR (Non-Af Amer)    > 60


 


POC Glucose (mg/dL)   


 


Random Glucose    201 H


 


Calcium    8.2 L


 


Total Bilirubin    1.0


 


AST    61 H D


 


ALT    308 H D


 


Alkaline Phosphatase    187 H D


 


Total Protein    6.1 L


 


Albumin    2.9 L D


 


Globulin    3.2


 


Albumin/Globulin Ratio    0.9 L


 


Vancomycin Trough  < 5.0 L  














  04/16/18





  11:35


 


WBC 


 


RBC 


 


Hgb 


 


Hct 


 


MCV 


 


MCH 


 


MCHC 


 


RDW 


 


Plt Count 


 


MPV 


 


Neut % (Auto) 


 


Lymph % (Auto) 


 


Mono % (Auto) 


 


Eos % (Auto) 


 


Baso % (Auto) 


 


Neut # (Auto) 


 


Lymph # (Auto) 


 


Mono # (Auto) 


 


Eos # (Auto) 


 


Baso # (Auto) 


 


Neutrophils % (Manual) 


 


Band Neutrophils % 


 


Lymphocytes % (Manual) 


 


Monocytes % (Manual) 


 


Platelet Estimate 


 


Hypochromasia (manual) 


 


Anisocytosis (manual) 


 


Sodium 


 


Potassium 


 


Chloride 


 


Carbon Dioxide 


 


Anion Gap 


 


BUN 


 


Creatinine 


 


Est GFR ( Amer) 


 


Est GFR (Non-Af Amer) 


 


POC Glucose (mg/dL)  156 H


 


Random Glucose 


 


Calcium 


 


Total Bilirubin 


 


AST 


 


ALT 


 


Alkaline Phosphatase 


 


Total Protein 


 


Albumin 


 


Globulin 


 


Albumin/Globulin Ratio 


 


Vancomycin Trough 








 Microbiology





04/14/18 13:30   Urine   Urine Culture - Final


                            Yeast Species


04/14/18 06:12   Blood   Blood Culture - Preliminary


                            NO GROWTH AFTER 48 HOURS


04/12/18 18:44   Blood-Venous   Blood Culture - Preliminary


                            NO GROWTH AFTER 3 DAYS


04/11/18 17:38   Urine,Sharma   Urine Culture - Final


                            No Growth (<1,000 CFU/ML)


04/11/18 01:20   Blood-Venous   Blood Culture - Final


                            Staphylococcus Aureus


04/11/18 01:20   Blood-Venous   Gram Stain - Final


04/11/18 01:40   Blood-Venous   S.aureus & Coag-Neg Staph PNA FISH - Final


04/11/18 01:40   Blood-Venous   Blood Culture - Final


                            Staphylococcus Aureus


04/11/18 01:40   Blood-Venous   Gram Stain - Final





 





 











Assessment and Plan


(1) Back pain


Status: Acute   





(2) Atrial fib/flutter, transient


Status: Acute   





(3) Fever


Status: Acute   





(4) Bacteremia due to Staphylococcus aureus


Status: Acute   





- Assessment and Plan (Free Text)


Assessment: 








A/P-


62 year old female with CHF, A,fib, Multiple Myeloma with mets and undergoing 

chemo and radiation  admitted with severe back pian and urinary retention, low 

garde fever and mild leukocytosis.





afebrile 


leukocytosis has resolved


blood cx- MSSA x 2


urine cx- neg


repeat blood cx- neg x 2


TTE- no vegetation as per report








plan-


continue with IV vancomycin for staph bacteremia.


keep vanco trough <20.


day #6 today.


check 2 more blood cx .


since TTE negative source of bacteremia could be the epidural region/ perhaps 

epidural abscess.


no LP as per anesthesia since pos blood cx.


hence pt. would be assumed to have epidural abscess based on MRI findings 

pending further results.


would need at least 3-4 weeks of IV abx for this MSSA bacteremia/ ? epidural 

abscess.





 all above d/w  the hospitalist and with

## 2018-04-16 NOTE — CP.PCM.PN
Subjective





- Date & Time of Evaluation


Date of Evaluation: 04/16/18


Time of Evaluation: 10:30





- Subjective


Subjective: 





Pt is in better spirit


no fever


Low back pain better controlled


+ BM


denies CP


no SOB


no abd pain





Objective





- Vital Signs/Intake and Output


Vital Signs (last 24 hours): 


 











Temp Pulse Resp BP Pulse Ox


 


 99.0 F   94 H  18   104/50 L  98 


 


 04/16/18 10:31  04/16/18 10:31  04/16/18 10:31  04/16/18 10:31  04/16/18 10:31








Intake and Output: 


 











 04/16/18 04/16/18





 06:59 18:59


 


Intake Total 990 


 


Balance 990 














- Medications


Medications: 


 Current Medications





Allopurinol (Zyloprim)  100 mg PO DAILY [facility]


   Last Admin: 04/16/18 08:32 Dose:  100 mg


Carvedilol (Coreg)  25 mg PO Q12 [facility]


   Last Admin: 04/16/18 08:32 Dose:  25 mg


Digoxin (Digoxin)  0.125 mg PO DAILY [facility]


   Last Admin: 04/16/18 08:32 Dose:  0.125 mg


Diphenhydramine HCl (Benadryl)  25 mg PO ONCE ONE


   Stop: 04/16/18 10:23


Docusate Sodium (Colace)  200 mg PO BID [facility]


   Last Admin: 04/16/18 08:33 Dose:  200 mg


Famotidine (Pepcid)  20 mg PO DAILY [facility]


   Last Admin: 04/16/18 08:31 Dose:  20 mg


Furosemide (Lasix)  20 mg PO BID [facility]


   Last Admin: 04/16/18 08:34 Dose:  20 mg


Granisetron HCl (Kytril)  1 mg PO ONCE ONE


   Stop: 04/16/18 10:24


Guaifenesin/Dextromethorphan (Robitussin Dm)  10 ml PO Q4 PRN


   PRN Reason: Cough


   Last Admin: 04/11/18 21:30 Dose:  10 ml


Home Med (Lenalidomide [Revlimid])  15 mg PO DAILY [facility]


   Last Admin: 04/16/18 08:33 Dose:  15 mg


Vancomycin HCl 1 gm/ Sodium (Chloride)  250 mls @ 166.667 mls/hr IVPB Q12 [facility]


   PRN Reason: Protocol


   Last Admin: 04/16/18 08:38 Dose:  166.667 mls/hr


Carfilzomib 54 mg/ Dextrose  50 mls @ 0 mls/hr IV ONCE ONE


   PRN Reason: As Directed


   Stop: 04/16/18 10:24


Lisinopril (Zestril)  10 mg PO DAILY [facility]


   Last Admin: 04/16/18 08:33 Dose:  10 mg


Morphine Sulfate (Morphine)  2 mg IVP Q4 PRN


   PRN Reason: Pain, moderate (4-7)


   Last Admin: 04/16/18 09:32 Dose:  2 mg


Potassium Chloride (K-Dur 20 Meq Er Tab)  20 meq PO BID [facility]


   Last Admin: 04/16/18 08:34 Dose:  20 meq


Pregabalin (Lyrica)  75 mg PO BID [facility]


   Last Admin: 04/15/18 17:35 Dose:  75 mg


Prochlorperazine (Compazine)  10 mg IVP Q8H [facility]


   PRN Reason: Nausea/Vomiting


Rivaroxaban (Xarelto)  20 mg PO DAILY [facility]


   PRN Reason: Protocol


   Last Admin: 04/16/18 08:32 Dose:  20 mg











- Labs


Labs: 


 





 04/14/18 06:12 





 04/14/18 06:12 





 











PT  13.8 Seconds (9.8-13.1)  H  04/12/18  12:05    


 


INR  1.2  (0.9-1.2)   04/12/18  12:05    


 


APTT  29.6 Seconds (25.6-37.1)   04/12/18  12:05    











- Constitutional


Appears: No Acute Distress





- Head Exam


Head Exam: ATRAUMATIC, NORMAL INSPECTION, NORMOCEPHALIC





- Eye Exam


Eye Exam: EOMI, Normal appearance


Pupil Exam: NORMAL ACCOMODATION





- ENT Exam


ENT Exam: Mucous Membranes Moist, Normal External Ear Exam





- Neck Exam


Neck Exam: Full ROM.  absent: Meningismus





- Respiratory Exam


Respiratory Exam: NORMAL BREATHING PATTERN.  absent: Rales, Wheezes, 

Respiratory Distress





- Cardiovascular Exam


Cardiovascular Exam: Irregular Rhythm, +S1, +S2





- GI/Abdominal Exam


GI & Abdominal Exam: Soft, Normal Bowel Sounds.  absent: Tenderness





- Extremities Exam


Extremities Exam: Normal Capillary Refill.  absent: Calf Tenderness, Pedal Edema





- Back Exam


Back Exam: paraspinal tenderness, vertebral tenderness.  absent: CVA tenderness 

(L), CVA tenderness (R)





- Neurological Exam


Neurological Exam: Alert, Awake, CN II-XII Intact, Oriented x3


Neuro motor strength exam: Left Upper Extremity: 5, Right Upper Extremity: 5, 

Left Lower Extremity: 5, Right Lower Extremity: 5





- Psychiatric Exam


Psychiatric exam: Normal Affect, Normal Mood





- Skin


Skin Exam: Dry, Normal Color, Warm





Assessment and Plan





- Assessment and Plan (Free Text)


Assessment: 





This is a 62 year old female with a past medical history significant for Atrial 

fibrillation anticoagulated on Xarelto, essential hypertension, Multiple myeloma

, and CHF, who presented to the ED complaining of worsening back pain. The 

patient has chronic back pain due to the multiple myeloma, however today the 

pain became much worse. In addition the patient was found to have urinary 

retention  x 1 day ,  prompting her to come to the ED. In the ED, the patient 

was found to be hemodynamically stable although in severe pain not relieved by 

Percocet. Sharma catheter was placed and drained large amount of urine.   

Patient had no motor or sensory deficits, no fecal or urinary incontinence, and 

no saddle anesthesia 





On 4/11, MRI of the thoracic/lumbar spine report showed no spinal cord 

compression, however it does show diffuse leptomeningeal thickening of the 

spinal cord suspicious for malignancy versus inflammation. There was originally 

concern for infection as well; however this is seen as much less likely as the 

patient has no hx of spinal surgery or signs of infection. As per discussion 

with consultants, they recommend the patient to have biopsy of the cord in 

order to ascertain if the thickening is due to metastatic disease versus 

inflammatory changes.  Neurosurgery was consulted ( Dr Javier- did not rec 

biopsy)





4/13: Susansufrida didnt rec biopsy. Anesthesia consulted for Lumbar puncture  

with Cytology for  further evaluation of the leptominengeal thickening.  

However since pt's Blood c/s came back + for  Staph aureus, anesthesia did not 

do LP.





1) Severe acute on chronic back pain with urinary retention, spinal cord 

compression ruled out; however now with diffuse leptomeningeal thickening on 

MRI concerning for metastatic disease vs. inflammatory changes/ Infection


- Continue telemetry monitoring


- Decadron 8 mg IVP q 8 hours - will slowly taper 


- Dr. ASHER Otto oncology consultant- plan to start Chemotherapy for MM today


- Dr. Lynn, neurology on consultation- rec steroids


- Dr. Javier, neurosurgery- no workup


- Pain better today








2) Staph aureus in blood culture x 2 bottles- ? etiology 


unclear etiology - ? Epidural abscess


- Staph bacteremia


- Dr. Zarate on consultation


- Echocardiogram : no vegetation


- Continue Vancomycin  x  4 wks  as rec by ID- plan for NEMESIO placement


-rpt Blood c/s neg ,  PICC line  placed 


- Urine c/s :  yeast





3) Afib with RVR- now controlled


- cont Digoxin 0.125 mg po daily and  Coreg


- telemetry monitoring


- cont Xarelto 








4) Hypokalemia,- resolved


- continue po repletion








5) Essential htn


- chronic


- restarted home po medications





6) Multiple myeloma


- chronic


- Dr. ASHER Otto on consultation for heme/onc- discussed case - plan to start 

Chemotherapy - today and tomorrow 





7. Abn LFT , ? mets vs sec to meds 


abd Sonogram





7. DVT prophylaxis


-  Xarelto

## 2018-04-16 NOTE — [id number].[id number]
Surgeon's Initial Post Op Note





- Surgeon's Notes


Surgeon: Tomas Russo MD


Assistant: None


Type of Anesthesia: Local


Pre-Operative Diagnosis: infection


Operative Findings: patent right basilic vein.  catheter length: 35 cm.  

catheter tip: cavoatrial junction


Post-Operative Diagnosis: same


Operation Performed: RUE PICC Insertion


Specimen/Specimens Removed: n/a


Estimated Blood Loss: EBL {In ML}: 3


Date of Surgery/Procedure: 04/16/18


Time of Surgery/Procedure: 10:30

## 2018-04-16 NOTE — VASCULAR
PROCEDURE:  PERIPHERALLY INSERTED CENTRAL VENOUS CATHETER INSERTION



CLINICAL HISTORY:  62-year-old female requiring long term intravenous 

antibiotics is referred to Interventional Radiology for PICC 

insertion.



COMPARISON:

None.



PROCEDURE:  1. Focused ultrasound of the right upper extremity 

vasculature.



2. Ultrasound-guided access.



3. Insertion of peripherally inserted central venous catheter.



4. Fluoroscopic localization of catheter tip.



PRE-PROCEDURE FINDINGS:  1. Patent right basilic vein.



POST-PROCEDURE FINDINGS:  1. Placement of 4 [place] single-lumen PICC.



2. Catheter length: 35 cm.



3. Catheter tip at cavoatrial junction.



INTERVENTIONAL RADIOLOGIST:  Tomas Russo M.D. (the attending was 

present for the entire procedure)



ANESTHESIA:  None.



MEDICATION:  Lidocaine 1% for local subcutaneous analgesia.



COMPLICATIONS:  None.



RADIATION DOSE:  Fluoroscopy Time: 7.6 seconds



Cumulative Dose: 1.44 mGy



PROCEDURE DESCRIPTION AND FINDINGS:  The risks, benefits, 

alternatives and possible complications of the procedure were fully 

discussed; all questions were answered and informed consent was 

obtained. The patient was brought into the interventional suite and a 

pre-procedure 'time-out' was performed. 



The patient was placed on the fluoroscopy table in the supine 

position. The right upper extremity was prepped and draped in the 

usual sterile fashion. Maximum sterile barrier precautions were 

maintained throughout the entire procedure. Preliminary ultrasound 

images of the right upper extremity vasculature demonstrate patency 

of the right basilic vein. Following subcutaneous infiltration of 1% 

lidocaine for local analgesia, under ultrasound guidance, a 21-gauge 

needle was advanced into the right basilic vein with real-time 

visualization of needle entry. The ultrasound images were permanently 

recorded and submitted to the PACS. A 0.018 guidewire was advanced 

centrally to the cavoatrial junction. A 4.5 [place] peel-away sheath 

was advanced over the guidewire. After obtaining length measurement, 

a 4 [place] single-lumen PICC was placed with the tip of the catheter 

at the cavoatrial junction. The total length of the catheter is 35 

cm. The hub of the PICC was secured to the skin using a sterile 

adhesive bandage.



The patient tolerated the procedure well without immediate 

post-procedure complications and was transferred back to the floor in 

stable condition.



IMPRESSION:

SUCCESSFUL INSERTION OF RIGHT UPPER EXTREMITY PICC.



PICC OK TO USE.

## 2018-04-16 NOTE — CP.PCM.PN
Subjective





- Date & Time of Evaluation


Date of Evaluation: 04/16/18


Time of Evaluation: 09:49





- Subjective


Subjective: 





Pt is afebrile in no acute distress. She is going to be on antibiotics for the 

next 2 weeks. She has already missed her chemo for 2 weeks because of hospital 

admissions. Considering the rate of progress of her myeloma, it is imperative 

that we start her chemo as soon as possible. It does not cause significant 

neutropenia , so it should not interfere with the infection and antibiotics.


Will start her on chemo today with Kyprolis,decadron and revlimid.





Objective





- Vital Signs/Intake and Output


Vital Signs (last 24 hours): 


 











Temp Pulse Resp BP Pulse Ox


 


 97.7 F   67   20   107/68   97 


 


 04/16/18 08:30  04/16/18 08:33  04/16/18 08:30  04/16/18 08:34  04/16/18 08:30








Intake and Output: 


 











 04/16/18 04/16/18





 06:59 18:59


 


Intake Total 990 


 


Balance 990 














- Medications


Medications: 


 Current Medications





Allopurinol (Zyloprim)  100 mg PO DAILY [facility]


   Last Admin: 04/16/18 08:32 Dose:  100 mg


Carvedilol (Coreg)  25 mg PO Q12 [facility]


   Last Admin: 04/16/18 08:32 Dose:  25 mg


Digoxin (Digoxin)  0.125 mg PO DAILY [facility]


   Last Admin: 04/16/18 08:32 Dose:  0.125 mg


Docusate Sodium (Colace)  200 mg PO BID [facility]


   Last Admin: 04/16/18 08:33 Dose:  200 mg


Famotidine (Pepcid)  20 mg PO DAILY [facility]


   Last Admin: 04/16/18 08:31 Dose:  20 mg


Furosemide (Lasix)  20 mg PO BID [facility]


   Last Admin: 04/16/18 08:34 Dose:  20 mg


Guaifenesin/Dextromethorphan (Robitussin Dm)  10 ml PO Q4 PRN


   PRN Reason: Cough


   Last Admin: 04/11/18 21:30 Dose:  10 ml


Home Med (Lenalidomide [Revlimid])  15 mg PO DAILY [facility]


   Last Admin: 04/16/18 08:33 Dose:  15 mg


Vancomycin HCl 1 gm/ Sodium (Chloride)  250 mls @ 166.667 mls/hr IVPB Q12 [facility]


   PRN Reason: Protocol


   Last Admin: 04/16/18 08:38 Dose:  166.667 mls/hr


Lisinopril (Zestril)  10 mg PO DAILY [facility]


   Last Admin: 04/16/18 08:33 Dose:  10 mg


Morphine Sulfate (Morphine)  2 mg IVP Q4 PRN


   PRN Reason: Pain, moderate (4-7)


   Last Admin: 04/16/18 09:32 Dose:  2 mg


Potassium Chloride (K-Dur 20 Meq Er Tab)  20 meq PO BID [facility]


   Last Admin: 04/16/18 08:34 Dose:  20 meq


Pregabalin (Lyrica)  75 mg PO BID [facility]


   Last Admin: 04/15/18 17:35 Dose:  75 mg


Prochlorperazine (Compazine)  10 mg IVP Q8H PRN


   PRN Reason: Nausea/Vomiting


Rivaroxaban (Xarelto)  20 mg PO DAILY [facility]


   PRN Reason: Protocol


   Last Admin: 04/16/18 08:32 Dose:  20 mg











- Labs


Labs: 


 





 04/14/18 06:12 





 04/14/18 06:12 





 











PT  13.8 Seconds (9.8-13.1)  H  04/12/18  12:05    


 


INR  1.2  (0.9-1.2)   04/12/18  12:05    


 


APTT  29.6 Seconds (25.6-37.1)   04/12/18  12:05

## 2018-04-17 VITALS — RESPIRATION RATE: 18 BRPM

## 2018-04-17 LAB
ALBUMIN SERPL-MCNC: 2.8 G/DL (ref 3.5–5)
ALBUMIN/GLOB SERPL: 0.9 {RATIO} (ref 1–2.1)
ALT SERPL-CCNC: 129 U/L (ref 9–52)
ANISOCYTOSIS BLD QL SMEAR: SLIGHT
AST SERPL-CCNC: 23 U/L (ref 14–36)
BASOPHILS # BLD AUTO: 0 K/UL (ref 0–0.2)
BASOPHILS NFR BLD: 0.1 % (ref 0–2)
BUN SERPL-MCNC: 16 MG/DL (ref 7–17)
CALCIUM SERPL-MCNC: 8.6 MG/DL (ref 8.4–10.2)
EOSINOPHIL # BLD AUTO: 0 K/UL (ref 0–0.7)
EOSINOPHIL NFR BLD: 0.1 % (ref 0–4)
ERYTHROCYTE [DISTWIDTH] IN BLOOD BY AUTOMATED COUNT: 15.7 % (ref 11.5–14.5)
GFR NON-AFRICAN AMERICAN: > 60
HGB BLD-MCNC: 13.2 G/DL (ref 12–16)
LYMPHOCYTE: 3 % (ref 20–50)
LYMPHOCYTES # BLD AUTO: 0.3 K/UL (ref 1–4.3)
LYMPHOCYTES NFR BLD AUTO: 1.6 % (ref 20–40)
MCH RBC QN AUTO: 29.4 PG (ref 27–31)
MCHC RBC AUTO-ENTMCNC: 32.7 G/DL (ref 33–37)
MCV RBC AUTO: 90.2 FL (ref 81–99)
MONOCYTE: 3 % (ref 0–10)
MONOCYTES # BLD: 0.5 K/UL (ref 0–0.8)
MONOCYTES NFR BLD: 3.1 % (ref 0–10)
NEUTROPHILS # BLD: 15.5 K/UL (ref 1.8–7)
NEUTROPHILS NFR BLD AUTO: 91 % (ref 42–75)
NEUTROPHILS NFR BLD AUTO: 95.1 % (ref 50–75)
NEUTS BAND NFR BLD: 3 % (ref 0–2)
NRBC BLD AUTO-RTO: 0 % (ref 0–0)
PLATELET # BLD EST: NORMAL 10*3/UL
PLATELET # BLD: 306 K/UL (ref 130–400)
PMV BLD AUTO: 8.6 FL (ref 7.2–11.7)
RBC # BLD AUTO: 4.48 MIL/UL (ref 3.8–5.2)
TOTAL CELLS COUNTED BLD: 100
WBC # BLD AUTO: 16.3 K/UL (ref 4.8–10.8)

## 2018-04-17 RX ADMIN — POTASSIUM CHLORIDE SCH MEQ: 20 TABLET, EXTENDED RELEASE ORAL at 16:35

## 2018-04-17 RX ADMIN — DIGOXIN SCH MG: 0.12 TABLET ORAL at 09:39

## 2018-04-17 RX ADMIN — POTASSIUM CHLORIDE SCH MEQ: 20 TABLET, EXTENDED RELEASE ORAL at 09:44

## 2018-04-17 RX ADMIN — OXYCODONE HYDROCHLORIDE AND ACETAMINOPHEN PRN TAB: 5; 325 TABLET ORAL at 10:12

## 2018-04-17 RX ADMIN — DEXAMETHASONE SODIUM PHOSPHATE SCH MG: 4 INJECTION, SOLUTION INTRAMUSCULAR; INTRAVENOUS at 14:33

## 2018-04-17 RX ADMIN — DEXAMETHASONE SODIUM PHOSPHATE SCH MG: 4 INJECTION, SOLUTION INTRAMUSCULAR; INTRAVENOUS at 06:09

## 2018-04-17 NOTE — CP.PCM.PN
Subjective





- Date & Time of Evaluation


Date of Evaluation: 04/17/18


Time of Evaluation: 09:25





- Subjective


Subjective: 





Pt still c/o pain in the right shoulder and upper back ,but improving.lower 

back pain is no longer present. She received chemotherapy yesterday and did 

well 


Second dose of chemotherapy today. Pt will be transferred to a sub acute rehab 

or TCU. tomorrow.





Objective





- Vital Signs/Intake and Output


Vital Signs (last 24 hours): 


 











Temp Pulse Resp BP Pulse Ox


 


 97.7 F   107 H  18   101/65   98 


 


 04/17/18 08:35  04/17/18 08:35  04/17/18 08:35  04/17/18 08:35  04/17/18 08:35











- Medications


Medications: 


 Current Medications





Allopurinol (Zyloprim)  100 mg PO DAILY [facility]


   Last Admin: 04/16/18 08:32 Dose:  100 mg


Carvedilol (Coreg)  25 mg PO Q12 [facility]


   Last Admin: 04/16/18 21:03 Dose:  Not Given


Dexamethasone (Decadron Inj)  8 mg IVP Q8H [facility]


   Last Admin: 04/17/18 06:09 Dose:  8 mg


Digoxin (Digoxin)  0.125 mg PO DAILY [facility]


   Last Admin: 04/16/18 08:32 Dose:  0.125 mg


Diphenhydramine HCl (Benadryl)  25 mg PO ONCE ONE


   Stop: 04/17/18 08:06


Docusate Sodium (Colace)  200 mg PO BID [facility]


   Last Admin: 04/16/18 16:57 Dose:  200 mg


Famotidine (Pepcid)  20 mg PO DAILY [facility]


   Last Admin: 04/16/18 08:31 Dose:  20 mg


Furosemide (Lasix)  20 mg PO BID [facility]


   Last Admin: 04/16/18 16:56 Dose:  20 mg


Granisetron HCl (Kytril)  1 mg PO ONCE ONE


   Stop: 04/17/18 08:06


Guaifenesin/Dextromethorphan (Robitussin Dm)  10 ml PO Q4 PRN


   PRN Reason: Cough


   Last Admin: 04/11/18 21:30 Dose:  10 ml


Home Med (Lenalidomide [Revlimid])  15 mg PO DAILY [facility]


   Last Admin: 04/16/18 08:33 Dose:  15 mg


Vancomycin HCl 1 gm/ Sodium (Chloride)  250 mls @ 166.667 mls/hr IVPB Q12 [facility]


   PRN Reason: Protocol


   Last Admin: 04/16/18 20:50 Dose:  166.667 mls/hr


Carfilzomib 54 mg/ Dextrose  50 mls @ 0 mls/hr IV ONCE ONE


   PRN Reason: As Directed


   Stop: 04/17/18 08:07


Lisinopril (Zestril)  10 mg PO DAILY [facility]


   Last Admin: 04/16/18 08:33 Dose:  10 mg


Oxycodone/Acetaminophen (Percocet 5/325 Mg Tab)  1 tab PO Q4 PRN


   PRN Reason: Other


   Stop: 04/19/18 19:25


   Last Admin: 04/16/18 21:05 Dose:  1 tab


Potassium Chloride (K-Dur 20 Meq Er Tab)  20 meq PO BID [facility]


   Last Admin: 04/16/18 16:56 Dose:  20 meq


Pregabalin (Lyrica)  75 mg PO BID [facility]


   Last Admin: 04/16/18 16:56 Dose:  75 mg


Prochlorperazine (Compazine)  10 mg IVP Q8H PRN


   PRN Reason: Nausea/Vomiting


Rivaroxaban (Xarelto)  20 mg PO DAILY [facility]


   PRN Reason: Protocol


   Last Admin: 04/16/18 08:32 Dose:  20 mg











- Labs


Labs: 


 





 04/17/18 04:25 





 04/17/18 04:25 





 











PT  13.8 Seconds (9.8-13.1)  H  04/12/18  12:05    


 


INR  1.2  (0.9-1.2)   04/12/18  12:05    


 


APTT  29.6 Seconds (25.6-37.1)   04/12/18  12:05

## 2018-04-17 NOTE — CP.PCM.PN
Subjective





- Date & Time of Evaluation


Date of Evaluation: 04/17/18


Time of Evaluation: 13:00





- Subjective


Subjective: 





ID note-





pt. seen and examined today.


in good spirits.


receiving her CTX day #2.


denies any fever or chills.


denies any diarrhea


denies any dysurea








Objective





- Vital Signs/Intake and Output


Vital Signs (last 24 hours): 


 











Temp Pulse Resp BP Pulse Ox


 


 97.3 F L  77   18   93/62 L  98 


 


 04/17/18 12:38  04/17/18 12:38  04/17/18 12:38  04/17/18 12:38  04/17/18 12:38











- Medications


Medications: 


 Current Medications





Allopurinol (Zyloprim)  100 mg PO DAILY [facility]


   Last Admin: 04/17/18 09:38 Dose:  100 mg


Carvedilol (Coreg)  25 mg PO Q12 [facility]


   Last Admin: 04/17/18 09:37 Dose:  25 mg


Dexamethasone (Decadron Inj)  8 mg IVP Q8H [facility]


   Last Admin: 04/17/18 14:33 Dose:  8 mg


Digoxin (Digoxin)  0.125 mg PO DAILY [facility]


   Last Admin: 04/17/18 09:39 Dose:  0.125 mg


Docusate Sodium (Colace)  200 mg PO BID [facility]


   Last Admin: 04/17/18 09:37 Dose:  200 mg


Famotidine (Pepcid)  20 mg PO DAILY [facility]


   Last Admin: 04/17/18 09:38 Dose:  20 mg


Furosemide (Lasix)  20 mg PO BID [facility]


   Last Admin: 04/17/18 09:38 Dose:  20 mg


Guaifenesin/Dextromethorphan (Robitussin Dm)  10 ml PO Q4 PRN


   PRN Reason: Cough


   Last Admin: 04/11/18 21:30 Dose:  10 ml


Home Med (Lenalidomide [Revlimid])  15 mg PO DAILY [facility]


   Last Admin: 04/17/18 09:38 Dose:  15 mg


Vancomycin HCl 1 gm/ Sodium (Chloride)  250 mls @ 166.667 mls/hr IVPB Q12 [facility]


   PRN Reason: Protocol


   Last Admin: 04/17/18 09:42 Dose:  166.667 mls/hr


Sodium Chloride (Sodium Chloride 0.9%)  500 mls @ 50 mls/hr IV .Q10H [facility]


   Stop: 04/17/18 22:14


Lisinopril (Zestril)  10 mg PO DAILY [facility]


   Last Admin: 04/17/18 09:43 Dose:  10 mg


Oxycodone/Acetaminophen (Percocet 5/325 Mg Tab)  1 tab PO Q4 PRN


   PRN Reason: Other


   Stop: 04/19/18 19:25


   Last Admin: 04/17/18 10:12 Dose:  1 tab


Potassium Chloride (K-Dur 20 Meq Er Tab)  20 meq PO BID [facility]


   Last Admin: 04/17/18 09:44 Dose:  20 meq


Pregabalin (Lyrica)  75 mg PO BID [facility]


   Last Admin: 04/17/18 09:42 Dose:  75 mg


Prochlorperazine (Compazine)  10 mg IVP Q8H PRN


   PRN Reason: Nausea/Vomiting


Rivaroxaban (Xarelto)  20 mg PO DAILY [facility]


   PRN Reason: Protocol


   Last Admin: 04/17/18 09:37 Dose:  20 mg











- Labs


Labs: 


 





 





- Additional Findings


Additional findings: 











- Constitutional


Appears: No Acute Distress





- Head Exam


Head Exam: ATRAUMATIC





- Eye Exam


Eye Exam: EOMI, PERRL





- ENT Exam


ENT Exam: Normal Oropharynx





- Neck Exam


Neck exam: Positive for: Full Rom





- Respiratory Exam


Respiratory Exam: Clear to Auscultation Bilateral, NORMAL BREATHING PATTERN





- Cardiovascular Exam


Cardiovascular Exam: Irregular Rhythm


Additional comments: 





Irregularly Irregular


rate controlled





- GI/Abdominal Exam


GI & Abdominal Exam: Normal Bowel Sounds, Soft


Additional comments: 





NT, ND





- Extremities Exam


Extremities exam: Positive for: normal inspection


Additional comments: 





no edema B/L LE





- Back Exam


Back exam: tenderness


Additional comments: 





positive tenderness in mid lower lumbar and thoracic region


no lesions


no erythema





- Neurological Exam


Neurological exam: Alert, Oriented x 3





 Laboratory Results - last 72 hr











  04/16/18 04/17/18 04/17/18





  11:35 04:25 04:25


 


WBC   16.3 H D 


 


RBC   4.48 


 


Hgb   13.2 


 


Hct   40.4 


 


MCV   90.2 


 


MCH   29.4 


 


MCHC   32.7 L 


 


RDW   15.7 H 


 


Plt Count   306 


 


MPV   8.6 


 


Neut % (Auto)   95.1 H 


 


Lymph % (Auto)   1.6 L 


 


Mono % (Auto)   3.1 


 


Eos % (Auto)   0.1 


 


Baso % (Auto)   0.1 


 


Neut # (Auto)   15.5 H 


 


Lymph # (Auto)   0.3 L 


 


Mono # (Auto)   0.5 


 


Eos # (Auto)   0.0 


 


Baso # (Auto)   0.0 


 


Neutrophils % (Manual)   91 H 


 


Band Neutrophils %   3 H 


 


Lymphocytes % (Manual)   3 L 


 


Monocytes % (Manual)   3 


 


Platelet Estimate   Normal 


 


Anisocytosis (manual)   Slight 


 


Sodium    140


 


Potassium    4.5


 


Chloride    103


 


Carbon Dioxide    27


 


Anion Gap    15


 


BUN    16


 


Creatinine    0.4 L


 


Est GFR ( Amer)    > 60


 


Est GFR (Non-Af Amer)    > 60


 


POC Glucose (mg/dL)  156 H  


 


Random Glucose    156 H


 


Calcium    8.6


 


Total Bilirubin    1.0


 


AST    23


 


ALT    129 H D


 


Alkaline Phosphatase    129 H D


 


Total Protein    5.8 L


 


Albumin    2.8 L


 


Globulin    3.0


 


Albumin/Globulin Ratio    0.9 L


 


Vancomycin Trough   














  04/17/18





  04:30


 


WBC 


 


RBC 


 


Hgb 


 


Hct 


 


MCV 


 


MCH 


 


MCHC 


 


RDW 


 


Plt Count 


 


MPV 


 


Neut % (Auto) 


 


Lymph % (Auto) 


 


Mono % (Auto) 


 


Eos % (Auto) 


 


Baso % (Auto) 


 


Neut # (Auto) 


 


Lymph # (Auto) 


 


Mono # (Auto) 


 


Eos # (Auto) 


 


Baso # (Auto) 


 


Neutrophils % (Manual) 


 


Band Neutrophils % 


 


Lymphocytes % (Manual) 


 


Monocytes % (Manual) 


 


Platelet Estimate 


 


Anisocytosis (manual) 


 


Sodium 


 


Potassium 


 


Chloride 


 


Carbon Dioxide 


 


Anion Gap 


 


BUN 


 


Creatinine 


 


Est GFR ( Amer) 


 


Est GFR (Non-Af Amer) 


 


POC Glucose (mg/dL) 


 


Random Glucose 


 


Calcium 


 


Total Bilirubin 


 


AST 


 


ALT 


 


Alkaline Phosphatase 


 


Total Protein 


 


Albumin 


 


Globulin 


 


Albumin/Globulin Ratio 


 


Vancomycin Trough  5.8








 Microbiology





04/12/18 18:44   Blood-Venous   Blood Culture - Final


                            NO GROWTH AFTER 5 DAYS


04/16/18 16:32   Blood-Venous   Blood Culture - Preliminary


                            NO GROWTH AFTER 24 HOURS


04/16/18 16:22   Blood-Venous   Blood Culture - Preliminary


                            NO GROWTH AFTER 24 HOURS


04/14/18 06:12   Blood   Blood Culture - Preliminary


                            NO GROWTH AFTER 3 DAYS


04/14/18 13:30   Urine   Urine Culture - Final


                            Yeast Species


04/11/18 17:38   Urine,Sharma   Urine Culture - Final


                            No Growth (<1,000 CFU/ML)


04/11/18 01:20   Blood-Venous   Blood Culture - Final


                            Staphylococcus Aureus


04/11/18 01:20   Blood-Venous   Gram Stain - Final


04/11/18 01:40   Blood-Venous   S.aureus & Coag-Neg Staph PNA FISH - Final


04/11/18 01:40   Blood-Venous   Blood Culture - Final


                            Staphylococcus Aureus


04/11/18 01:40   Blood-Venous   Gram Stain - Final





 





 











Assessment and Plan


(1) Back pain


Status: Acute   





(2) Atrial fib/flutter, transient


Status: Acute   





(3) Fever


Status: Acute   





(4) Bacteremia due to Staphylococcus aureus


Status: Acute   





- Assessment and Plan (Free Text)


Assessment: 





A/P-


62 year old female with CHF, A,fib, Multiple Myeloma with mets and undergoing 

chemo and radiation  admitted with severe back pian and urinary retention, low 

garde fever and mild leukocytosis.





afebrile 


new leukocytoiss most likley from combination of decadron and granix ( guven by 

onc)


blood cx- MSSA x 2


urine cx- neg


repeat blood cx- neg x 4


TTE- no vegetation as per report








plan-


continue with IV vancomycin for staph bacteremia.


keep vanco trough <20.


day #7 today.


would need at least 3-4 weeks of IV abx for this MSSA bacteremia/ ? epidural 

abscess.





 all above d/w  the hospitalist and with

## 2018-04-17 NOTE — CP.PCM.PN
Subjective





- Date & Time of Evaluation


Date of Evaluation: 04/17/18


Time of Evaluation: 10:00





- Subjective


Subjective: 





On Day 2 of chemotherapy


Pt is afebrile


her back pain is very much better


no CP


no SOB


no abd pain








Objective





- Vital Signs/Intake and Output


Vital Signs (last 24 hours): 


 











Temp Pulse Resp BP Pulse Ox


 


 97.1 F L  93 H  20   94/65 L  97 


 


 04/17/18 15:41  04/17/18 15:41  04/17/18 15:41  04/17/18 15:41  04/17/18 15:41











- Medications


Medications: 


 Current Medications





Allopurinol (Zyloprim)  100 mg PO DAILY [facility]


   Last Admin: 04/17/18 09:38 Dose:  100 mg


Carvedilol (Coreg)  25 mg PO Q12 [facility]


   Last Admin: 04/17/18 09:37 Dose:  25 mg


Dexamethasone (Decadron Inj)  8 mg IVP Q8H [facility]


   Last Admin: 04/17/18 14:33 Dose:  8 mg


Digoxin (Digoxin)  0.125 mg PO DAILY [facility]


   Last Admin: 04/17/18 09:39 Dose:  0.125 mg


Docusate Sodium (Colace)  200 mg PO BID [facility]


   Last Admin: 04/17/18 09:37 Dose:  200 mg


Famotidine (Pepcid)  20 mg PO DAILY [facility]


   Last Admin: 04/17/18 09:38 Dose:  20 mg


Furosemide (Lasix)  20 mg PO BID [facility]


   Last Admin: 04/17/18 09:38 Dose:  20 mg


Guaifenesin/Dextromethorphan (Robitussin Dm)  10 ml PO Q4 PRN


   PRN Reason: Cough


   Last Admin: 04/11/18 21:30 Dose:  10 ml


Home Med (Lenalidomide [Revlimid])  15 mg PO DAILY [facility]


   Last Admin: 04/17/18 09:38 Dose:  15 mg


Vancomycin HCl 1 gm/ Sodium (Chloride)  250 mls @ 166.667 mls/hr IVPB Q12 [facility]


   PRN Reason: Protocol


   Last Admin: 04/17/18 09:42 Dose:  166.667 mls/hr


Sodium Chloride (Sodium Chloride 0.9%)  500 mls @ 50 mls/hr IV .Q10H [facility]


   Stop: 04/17/18 22:14


   Last Admin: 04/17/18 12:30 Dose:  50 mls/hr


Lisinopril (Zestril)  10 mg PO DAILY [facility]


   Last Admin: 04/17/18 09:43 Dose:  10 mg


Oxycodone/Acetaminophen (Percocet 5/325 Mg Tab)  1 tab PO Q4 PRN


   PRN Reason: Other


   Stop: 04/19/18 19:25


   Last Admin: 04/17/18 10:12 Dose:  1 tab


Potassium Chloride (K-Dur 20 Meq Er Tab)  20 meq PO BID [facility]


   Last Admin: 04/17/18 09:44 Dose:  20 meq


Pregabalin (Lyrica)  75 mg PO BID [facility]


   Last Admin: 04/17/18 09:42 Dose:  75 mg


Prochlorperazine (Compazine)  10 mg IVP Q8H PRN


   PRN Reason: Nausea/Vomiting


Rivaroxaban (Xarelto)  20 mg PO DAILY [facility]


   PRN Reason: Protocol


   Last Admin: 04/17/18 09:37 Dose:  20 mg











- Labs


Labs: 


 





 04/17/18 04:25 





 04/17/18 04:25 





 











PT  13.8 Seconds (9.8-13.1)  H  04/12/18  12:05    


 


INR  1.2  (0.9-1.2)   04/12/18  12:05    


 


APTT  29.6 Seconds (25.6-37.1)   04/12/18  12:05    














- Constitutional


Appears: No Acute Distress





- Head Exam


Head Exam: ATRAUMATIC, NORMAL INSPECTION, NORMOCEPHALIC





- Eye Exam


Eye Exam: EOMI, Normal appearance


Pupil Exam: NORMAL ACCOMMODATION





- ENT Exam


ENT Exam: Mucous Membranes Moist, Normal External Ear Exam





- Neck Exam


Neck Exam: Full ROM.  absent: Meningismus





- Respiratory Exam


Respiratory Exam: NORMAL BREATHING PATTERN.  absent: Rales, Wheezes, 

Respiratory Distress





- Cardiovascular Exam


Cardiovascular Exam: Irregular Rhythm, +S1, +S2





- GI/Abdominal Exam


GI & Abdominal Exam: Soft, Normal Bowel Sounds.  absent: Tenderness





- Extremities Exam


Extremities Exam: Normal Capillary Refill.  absent: Calf Tenderness, Pedal Edema





- Back Exam


Back Exam: No paraspinal tenderness,  No vertebral tenderness.  absent: CVA 

tenderness (L), CVA tenderness (R)





- Neurological Exam


Neurological Exam: Alert, Awake, CN II-XII Intact, Oriented x3


Neuro motor strength exam: Left Upper Extremity: 5, Right Upper Extremity: 5, 

Left Lower Extremity: 5, Right Lower Extremity: 5





- Psychiatric Exam


Psychiatric exam: Normal Affect, Normal Mood





- Skin


Skin Exam: Dry, Normal Color, Warm





Assessment and Plan





- Assessment and Plan (Free Text)


Assessment: 








This is a 62 year old female with a past medical history significant for Atrial 

fibrillation anticoagulated on Xarelto, essential hypertension, Multiple myeloma

, and CHF, who presented to the ED complaining of worsening back pain. The 

patient has chronic back pain due to the multiple myeloma, however today the 

pain became much worse. In addition the patient was found to have urinary 

retention  x 1 day ,  prompting her to come to the ED. In the ED, the patient 

was found to be hemodynamically stable although in severe pain not relieved by 

Percocet. Sharma catheter was placed and drained large amount of urine.   

Patient had no motor or sensory deficits, no fecal or urinary incontinence, and 

no saddle anesthesia 





On 4/11, MRI of the thoracic/lumbar spine report showed no spinal cord 

compression, however it does show diffuse leptomeningeal thickening of the 

spinal cord suspicious for malignancy versus inflammation. There was originally 

concern for infection as well; however this is seen as much less likely as the 

patient has no hx of spinal surgery or signs of infection. As per discussion 

with consultants, they recommend the patient to have biopsy of the cord in 

order to ascertain if the thickening is due to metastatic disease versus 

inflammatory changes.  Neurosurgery was consulted ( Dr Javier- did not rec 

biopsy)





4/13: Neurosurg didnt rec biopsy. Anesthesia consulted for Lumbar puncture  

with Cytology for  further evaluation of the leptominengeal thickening.  

However since pt's Blood c/s came back + for  Staph aureus, anesthesia did not 

do LP.





4/16 : Started on Chemotherapy





1) Severe acute on chronic back pain with urinary retention, spinal cord 

compression ruled out; however now with diffuse leptomeningeal thickening on 

MRI concerning for metastatic disease vs. inflammatory changes/ Infection


- Continue telemetry monitoring


- Decadron 8 mg IVP q 8 hours - will taper to 4 mg q 8 in am


- Dr. ASHER Otto oncology consultant-  started Chemotherapy for MM  - 


- Dr. Lynn, neurology on consultation- rec steroids


- Dr. Javier, neurosurgery- no workup


- Pain better today








2) Bacteremia : Staph aureus  (blood culture x 2 bottles)- ? etiology 


unclear etiology - ? Epidural abscess


- Staph bacteremia


- Dr. Zarate on consult


- Echocardiogram : no vegetation


- Continue Vancomycin  x  3-4 wks total  as rec by ID- plan for NEMESIO placement


-rpt Blood c/s neg ,  PICC line  placed 


- Urine c/s :  yeast





3) Afib with RVR- now controlled


- cont Digoxin 0.125 mg po daily and  Coreg


- telemetry monitoring


- cont Xarelto 








4) Hypokalemia,- resolved


- continue po repletion








5) Essential htn


- chronic


- restarted home po medications





6) Multiple myeloma


- chronic


- Dr. ASHER Otto on consultation for heme/onc- discussed case - started  

Chemotherapy yesterday - will complete chemotx today


- Plan for Chemotx again nect Monday and Tuesday





7. Abn LFT , ? mets vs sec to meds 


abd Sonogram: Hepatic cyst  ( no change from previous)





7. DVT prophylaxis


-  Xarelto

## 2018-04-18 VITALS
TEMPERATURE: 97.9 F | SYSTOLIC BLOOD PRESSURE: 96 MMHG | HEART RATE: 86 BPM | DIASTOLIC BLOOD PRESSURE: 63 MMHG | OXYGEN SATURATION: 97 %

## 2018-04-18 VITALS — HEART RATE: 75 BPM

## 2018-04-18 LAB
BUN SERPL-MCNC: 24 MG/DL (ref 7–17)
CALCIUM SERPL-MCNC: 8.8 MG/DL (ref 8.4–10.2)
ERYTHROCYTE [DISTWIDTH] IN BLOOD BY AUTOMATED COUNT: 16 % (ref 11.5–14.5)
GFR NON-AFRICAN AMERICAN: > 60
HGB BLD-MCNC: 12.7 G/DL (ref 12–16)
MCH RBC QN AUTO: 30 PG (ref 27–31)
MCHC RBC AUTO-ENTMCNC: 33.2 G/DL (ref 33–37)
MCV RBC AUTO: 90.6 FL (ref 81–99)
PLATELET # BLD: 248 K/UL (ref 130–400)
RBC # BLD AUTO: 4.24 MIL/UL (ref 3.8–5.2)
WBC # BLD AUTO: 10 K/UL (ref 4.8–10.8)

## 2018-04-18 RX ADMIN — DEXAMETHASONE SODIUM PHOSPHATE SCH MG: 4 INJECTION, SOLUTION INTRAMUSCULAR; INTRAVENOUS at 08:32

## 2018-04-18 RX ADMIN — OXYCODONE HYDROCHLORIDE AND ACETAMINOPHEN PRN TAB: 5; 325 TABLET ORAL at 08:43

## 2018-04-18 RX ADMIN — OXYCODONE HYDROCHLORIDE AND ACETAMINOPHEN PRN TAB: 5; 325 TABLET ORAL at 00:07

## 2018-04-18 RX ADMIN — DIGOXIN SCH MG: 0.12 TABLET ORAL at 08:34

## 2018-04-18 RX ADMIN — DEXAMETHASONE SODIUM PHOSPHATE SCH MG: 4 INJECTION, SOLUTION INTRAMUSCULAR; INTRAVENOUS at 00:10

## 2018-04-18 RX ADMIN — POTASSIUM CHLORIDE SCH MEQ: 20 TABLET, EXTENDED RELEASE ORAL at 08:34

## 2018-04-18 NOTE — CP.PCM.PN
Subjective





- Date & Time of Evaluation


Date of Evaluation: 04/18/18


Time of Evaluation: 09:55





- Subjective


Subjective: 





Pt is afebrile in no acute distress. She is able to ambulate with help. Back 

pain is better, with the present analgesic regimen. She will be discharged to a 

sub acute rehab for continuation of antibiotics and physical therapy





Objective





- Vital Signs/Intake and Output


Vital Signs (last 24 hours): 


 











Temp Pulse Resp BP Pulse Ox


 


 97.8 F   75   18   123/77   98 


 


 04/18/18 07:50  04/18/18 08:31  04/18/18 07:50  04/18/18 08:32  04/18/18 07:50











- Medications


Medications: 


 Current Medications





Allopurinol (Zyloprim)  100 mg PO DAILY [facility]


   Last Admin: 04/18/18 08:34 Dose:  100 mg


Carvedilol (Coreg)  25 mg PO Q12 [facility]


   Last Admin: 04/18/18 08:31 Dose:  25 mg


Dexamethasone (Decadron Inj)  4 mg IV Q8H [facility]


   Last Admin: 04/18/18 08:32 Dose:  4 mg


Digoxin (Digoxin)  0.125 mg PO DAILY [facility]


   Last Admin: 04/18/18 08:34 Dose:  0.125 mg


Docusate Sodium (Colace)  200 mg PO BID [facility]


   Last Admin: 04/18/18 08:30 Dose:  200 mg


Famotidine (Pepcid)  20 mg PO DAILY [facility]


   Last Admin: 04/18/18 08:33 Dose:  20 mg


Furosemide (Lasix)  20 mg PO BID [facility]


   Last Admin: 04/18/18 08:32 Dose:  20 mg


Guaifenesin/Dextromethorphan (Robitussin Dm)  10 ml PO Q4 PRN


   PRN Reason: Cough


   Last Admin: 04/11/18 21:30 Dose:  10 ml


Home Med (Lenalidomide [Revlimid])  15 mg PO DAILY [facility]


   Last Admin: 04/18/18 08:33 Dose:  15 mg


Vancomycin HCl 1 gm/ Sodium (Chloride)  250 mls @ 166.667 mls/hr IVPB Q12 [facility]


   PRN Reason: Protocol


   Last Admin: 04/18/18 08:33 Dose:  166.667 mls/hr


Lisinopril (Zestril)  10 mg PO DAILY [facility]


   Last Admin: 04/18/18 08:31 Dose:  10 mg


Oxycodone/Acetaminophen (Percocet 5/325 Mg Tab)  1 tab PO Q4 PRN


   PRN Reason: Other


   Stop: 04/19/18 19:25


   Last Admin: 04/18/18 08:43 Dose:  1 tab


Potassium Chloride (K-Dur 20 Meq Er Tab)  20 meq PO BID [facility]


   Last Admin: 04/18/18 08:34 Dose:  20 meq


Pregabalin (Lyrica)  75 mg PO BID [facility]


   Last Admin: 04/18/18 08:36 Dose:  75 mg


Prochlorperazine (Compazine)  10 mg IVP Q8H PRN


   PRN Reason: Nausea/Vomiting


Rivaroxaban (Xarelto)  20 mg PO DAILY [facility]


   PRN Reason: Protocol


   Last Admin: 04/18/18 08:34 Dose:  20 mg











- Labs


Labs: 


 





 04/18/18 04:20 





 04/18/18 04:20 





 











PT  13.8 Seconds (9.8-13.1)  H  04/12/18  12:05    


 


INR  1.2  (0.9-1.2)   04/12/18  12:05    


 


APTT  29.6 Seconds (25.6-37.1)   04/12/18  12:05

## 2018-04-18 NOTE — CP.PCM.DIS
Provider





- Provider


Date of Admission: 


04/10/18 16:24





Attending physician: 


Hermann Middleton DO





Primary care physician: 





Dr. Pickering


Consults: 


ID consult 


Hem/onc consult 


IR consult 


Neurology consult 


Neurosurgery consult 


Anesthesiology consult


Time Spent in preparation of Discharge (in minutes): 15





Hospital Course





- Lab Results


Lab Results: 


 Micro Results





04/14/18 06:12   Blood   Blood Culture - Preliminary


                            NO GROWTH AFTER 4 DAYS


04/12/18 18:44   Blood-Venous   Blood Culture - Final


                            NO GROWTH AFTER 5 DAYS


04/16/18 16:32   Blood-Venous   Blood Culture - Preliminary


                            NO GROWTH AFTER 24 HOURS


04/16/18 16:22   Blood-Venous   Blood Culture - Preliminary


                            NO GROWTH AFTER 24 HOURS


04/14/18 13:30   Urine   Urine Culture - Final


                            Yeast Species


04/11/18 17:38   Urine,Sharma   Urine Culture - Final


                            No Growth (<1,000 CFU/ML)


04/11/18 01:20   Blood-Venous   Blood Culture - Final


                            Staphylococcus Aureus


04/11/18 01:20   Blood-Venous   Gram Stain - Final


04/11/18 01:40   Blood-Venous   S.aureus & Coag-Neg Staph PNA FISH - Final


04/11/18 01:40   Blood-Venous   Blood Culture - Final


                            Staphylococcus Aureus


04/11/18 01:40   Blood-Venous   Gram Stain - Final





 Most Recent Lab Values











WBC  10.0 K/uL (4.8-10.8)   04/18/18  04:20    


 


RBC  4.24 Mil/uL (3.80-5.20)   04/18/18  04:20    


 


Hgb  12.7 g/dL (12.0-16.0)   04/18/18  04:20    


 


Hct  38.4 % (34.0-47.0)   04/18/18  04:20    


 


MCV  90.6 fl (81.0-99.0)   04/18/18  04:20    


 


MCH  30.0 pg (27.0-31.0)   04/18/18  04:20    


 


MCHC  33.2 g/dL (33.0-37.0)   04/18/18  04:20    


 


RDW  16.0 % (11.5-14.5)  H  04/18/18  04:20    


 


Plt Count  248 K/uL (130-400)   04/18/18  04:20    


 


MPV  8.6 fl (7.2-11.7)   04/17/18  04:25    


 


Neut % (Auto)  95.1 % (50.0-75.0)  H  04/17/18  04:25    


 


Lymph % (Auto)  1.6 % (20.0-40.0)  L  04/17/18  04:25    


 


Mono % (Auto)  3.1 % (0.0-10.0)   04/17/18  04:25    


 


Eos % (Auto)  0.1 % (0.0-4.0)   04/17/18  04:25    


 


Baso % (Auto)  0.1 % (0.0-2.0)   04/17/18  04:25    


 


Neut # (Auto)  15.5 K/uL (1.8-7.0)  H  04/17/18  04:25    


 


Lymph # (Auto)  0.3 K/uL (1.0-4.3)  L  04/17/18  04:25    


 


Mono # (Auto)  0.5 K/uL (0.0-0.8)   04/17/18  04:25    


 


Eos # (Auto)  0.0 K/uL (0.0-0.7)   04/17/18  04:25    


 


Baso # (Auto)  0.0 K/uL (0.0-0.2)   04/17/18  04:25    


 


Neutrophils % (Manual)  91 % (42-75)  H  04/17/18  04:25    


 


Band Neutrophils %  3 % (0-2)  H  04/17/18  04:25    


 


Lymphocytes % (Manual)  3 % (20-50)  L  04/17/18  04:25    


 


Monocytes % (Manual)  3 % (0-10)   04/17/18  04:25    


 


Platelet Estimate  Normal  (NORMAL)   04/17/18  04:25    


 


Large Platelets  Present   04/10/18  12:05    


 


Giant Platelets  Present   04/10/18  12:05    


 


Hypochromasia (manual)  Slight   04/14/18  06:12    


 


Anisocytosis (manual)  Slight   04/17/18  04:25    


 


ESR  93 mm/hr (0-30)  H  04/11/18  19:00    


 


PT  13.8 Seconds (9.8-13.1)  H  04/12/18  12:05    


 


INR  1.2  (0.9-1.2)   04/12/18  12:05    


 


APTT  29.6 Seconds (25.6-37.1)   04/12/18  12:05    


 


Sodium  138 mmol/l (132-148)   04/18/18  04:20    


 


Potassium  5.0 MMOL/L (3.6-5.0)   04/18/18  04:20    


 


Chloride  104 mmol/L ()   04/18/18  04:20    


 


Carbon Dioxide  24 mmol/L (22-30)   04/18/18  04:20    


 


Anion Gap  15  (10-20)   04/18/18  04:20    


 


BUN  24 mg/dl (7-17)  H  04/18/18  04:20    


 


Creatinine  0.4 mg/dl (0.7-1.2)  L  04/18/18  04:20    


 


Est GFR ( Amer)  > 60   04/18/18  04:20    


 


Est GFR (Non-Af Amer)  > 60   04/18/18  04:20    


 


POC Glucose (mg/dL)  156 mg/dL ()  H  04/16/18  11:35    


 


Random Glucose  205 mg/dL ()  H  04/18/18  04:20    


 


Uric Acid  2.1 mg/Dl (2.2-7.5)  L  04/10/18  12:05    


 


Calcium  8.8 mg/dL (8.4-10.2)   04/18/18  04:20    


 


Total Bilirubin  1.0 mg/dl (0.2-1.3)   04/17/18  04:25    


 


AST  23 U/L (14-36)   04/17/18  04:25    


 


ALT  129 U/L (9-52)  H D 04/17/18  04:25    


 


Alkaline Phosphatase  129 U/L ()  H D 04/17/18  04:25    


 


NT-Pro-B Natriuret Pep  898 pg/ml (0-900)   04/10/18  14:21    


 


Total Protein  5.8 G/DL (6.3-8.2)  L  04/17/18  04:25    


 


Albumin  2.8 g/dL (3.5-5.0)  L  04/17/18  04:25    


 


Globulin  3.0 gm/dL (2.2-3.9)   04/17/18  04:25    


 


Albumin/Globulin Ratio  0.9  (1.0-2.1)  L  04/17/18  04:25    


 


Procalcitonin  0.41 NG/ML (0.19-0.49)   04/11/18  18:44    


 


Urine Color  Yellow  (YELLOW)   04/11/18  17:38    


 


Urine Clarity  Slighty-cloudy  (Clear)   04/11/18  17:38    


 


Urine pH  6.0  (5.0-8.0)   04/11/18  17:38    


 


Ur Specific Gravity  1.029  (1.003-1.030)   04/11/18  17:38    


 


Urine Protein  100 mg/dL (NEGATIVE)   04/11/18  17:38    


 


Urine Glucose (UA)  150 mg/dL (Normal)   04/11/18  17:38    


 


Urine Ketones  Trace mg/dL (NEGATIVE)   04/11/18  17:38    


 


Urine Blood  Moderate  (NEGATIVE)   04/11/18  17:38    


 


Urine Nitrate  Negative  (NEGATIVE)   04/11/18  17:38    


 


Urine Bilirubin  Negative  (NEGATIVE)   04/11/18  17:38    


 


Urine Urobilinogen  0.2-1.0 mg/dL (0.2-1.0)   04/11/18  17:38    


 


Ur Leukocyte Esterase  Neg Ely/uL (Negative)   04/11/18  17:38    


 


Urine RBC (Auto)  84 /hpf (0-3)  H  04/11/18  17:38    


 


Urine Microscopic WBC  7 /hpf (0-5)  H  04/11/18  17:38    


 


Ur Squamous Epith Cells  < 1 /hpf (0-5)   04/11/18  17:38    


 


Vancomycin Trough  5.8 ug/mL (5.0-10.0)   04/17/18  04:30    


 


Digoxin  < 0.4 ng/mL (0.8-2.0)  L  04/10/18  14:21    














- Hospital Course


Hospital Course: 


This is a 62 year old female with a past medical history significant for Atrial 

fibrillation anticoagulated on Xarelto, essential hypertension, Multiple myeloma

, and CHF, who presented to the ED complaining of worsening back pain. The 

patient has chronic back pain due to the multiple myeloma, however today the 

pain became much worse. In addition the patient was found to have urinary 

retention  x 1 day ,  prompting her to come to the ED. In the ED, the patient 

was found to be hemodynamically stable although in severe pain not relieved by 

Percocet. Sharma catheter was placed and drained large amount of urine.   

Patient had no motor or sensory deficits, no fecal or urinary incontinence, and 

no saddle anesthesia 


On 4/11, MRI of the thoracic/lumbar spine report showed no spinal cord 

compression, however it does show diffuse leptomeningeal thickening of the 

spinal cord suspicious for malignancy versus inflammation. There was originally 

concern for infection as well; however this is seen as much less likely as the 

patient has no hx of spinal surgery or signs of infection. As per discussion 

with consultants, they recommend the patient to have biopsy of the cord in 

order to ascertain if the thickening is due to metastatic disease versus 

inflammatory changes.  Neurosurgery was consulted ( Dr Javier- did not rec 

biopsy)


Anesthesia consulted for Lumbar puncture  with Cytology for  further evaluation 

of the leptominengeal thickening.  However since pt's Blood c/s came back + for

  Staph aureus, anesthesia opted not to perform the LP.After discussion with ID 

decided to continue treatment with Vancomycin IV for total 4 weeks for her 

bacteremia.Hem/onc consulted and Chemotherapy was started on 4/16. 


Patient is hemodynamically stable, afebrile. Participating with PT and pain is 

better controlled. Will discharge her to [facility] for continuation of her PT and IV 

antibiotics. She will need to continue with her Chemotherapy infusion every 

week on Monday and Tuesday and need to return to infusion center for that.


Will need repeat blood tests weekly .


D/C to [facility] 








1. Severe acute on chronic back pain with urinary retention, spinal cord 

compression ruled out; however now with diffuse leptomeningeal thickening on 

MRI concerning for metastatic disease vs. inflammatory changes/ Infection


Started on Decadron tapering dose. Will d/c on 4 mg PO 8 hours 


Neuro, neurosurgery , Hem/onc ,anesthesia/pain management and PT were consulted 


Started on Lyrica, decadron and Percoset PRN for pain control


Participated with PT


will d/c to [facility] 








2. Bacteremia : Staph aureus  (blood culture x 2 bottles)- ? etiology 


unclear etiology 


Staph bacteremia


ID consulted and recommended 4 weeks of IV vancomycin 


PICC line placed 


Will d/c to [facility] for continuation of treatment 


Echocardiogram : no vegetation


Repeat blood culture showed no growth 


Check BMP weekly. Check vanco levels weekly 





3.Afib with RVR


 now controlled


cont Digoxin 0.125 mg po daily and  Coreg


cont Xarelto 








4. Hypokalemia


resolved


continue po repletion





5.Essential htn


chronic, controlled 





6.Multiple myeloma


chronic


hem/onc ,Dr. ASHER Otto on consultation for heme/onc. Started  Chemotherapy . will 

continue chemotherapy every week . arrangewments made by  for patient to 

return to infusion center for chemotherapy every Monday and Tuesday 





7. Abnormal  LFT 


possible  sec to meds 


abd Sonogram showed Hepatic cyst  ( no change from previous)











Discharge Exam





- Head Exam


Head Exam: ATRAUMATIC, NORMOCEPHALIC





- Eye Exam


Eye Exam: EOMI, Normal appearance, PERRL


Pupil Exam: NORMAL ACCOMODATION





- ENT Exam


ENT Exam: Mucous Membranes Moist, Normal Exam





- Neck Exam


Neck exam: Full Rom, Normal Inspection





- Respiratory Exam


Respiratory Exam: Clear to PA & Lateral, NORMAL BREATHING PATTERN.  absent: 

Rales, Rhonchi, Wheezes





- Cardiovascular Exam


Cardiovascular Exam: REGULAR RHYTHM, RRR, +S1, +S2.  absent: JVD





- GI/Abdominal Exam


GI & Abdominal Exam: Normal Bowel Sounds, Soft.  absent: Distended, Guarding, 

Rebound, Tenderness





- Rectal Exam


Rectal Exam: Deferred





- Extremities Exam


Extremities exam: normal capillary refill, normal inspection, pedal pulses 

present





- Back Exam


Back exam: NORMAL INSPECTION





- Neurological Exam


Neurological exam: Alert, CN II-XII Intact, Reflexes Normal





- Psychiatric Exam


Psychiatric exam: Normal Affect





- Skin


Skin Exam: Dry, Warm





Discharge Plan





- Follow Up Plan


Condition: STABLE


Disposition: REHAB FACILITY/REHAB UNIT


Patient education suggested?: Yes


Instructions:  Multiple Myeloma (DC)


Referrals: 


Tong Pickering MD [Family Provider] - 


Eliu Otto MD [Staff Provider] -

## 2018-05-21 ENCOUNTER — HOSPITAL ENCOUNTER (INPATIENT)
Dept: HOSPITAL 14 - H.ER | Age: 63
LOS: 8 days | DRG: 531 | End: 2018-05-29
Attending: INTERNAL MEDICINE | Admitting: INTERNAL MEDICINE
Payer: MEDICAID

## 2018-05-21 VITALS — BODY MASS INDEX: 32.3 KG/M2

## 2018-05-21 DIAGNOSIS — J18.9: ICD-10-CM

## 2018-05-21 DIAGNOSIS — G06.1: Primary | ICD-10-CM

## 2018-05-21 DIAGNOSIS — B95.61: ICD-10-CM

## 2018-05-21 DIAGNOSIS — I11.0: ICD-10-CM

## 2018-05-21 DIAGNOSIS — I50.32: ICD-10-CM

## 2018-05-21 DIAGNOSIS — C90.00: ICD-10-CM

## 2018-05-21 DIAGNOSIS — B96.20: ICD-10-CM

## 2018-05-21 DIAGNOSIS — Z79.01: ICD-10-CM

## 2018-05-21 DIAGNOSIS — N39.0: ICD-10-CM

## 2018-05-21 DIAGNOSIS — I48.2: ICD-10-CM

## 2018-05-21 DIAGNOSIS — R79.1: ICD-10-CM

## 2018-05-21 DIAGNOSIS — G95.29: ICD-10-CM

## 2018-05-21 DIAGNOSIS — E87.6: ICD-10-CM

## 2018-05-21 DIAGNOSIS — M47.816: ICD-10-CM

## 2018-05-21 DIAGNOSIS — G82.20: ICD-10-CM

## 2018-05-21 LAB
ALBUMIN SERPL-MCNC: 3.2 G/DL (ref 3.5–5)
ALBUMIN/GLOB SERPL: 1 {RATIO} (ref 1–2.1)
ALT SERPL-CCNC: 60 U/L (ref 9–52)
APTT BLD: 41.4 SECONDS (ref 25.6–37.1)
AST SERPL-CCNC: 24 U/L (ref 14–36)
BACTERIA #/AREA URNS HPF: (no result) /[HPF]
BASOPHILS # BLD AUTO: 0 K/UL (ref 0–0.2)
BASOPHILS NFR BLD: 0.1 % (ref 0–2)
BILIRUB UR-MCNC: NEGATIVE MG/DL
BUN SERPL-MCNC: 15 MG/DL (ref 7–17)
CALCIUM SERPL-MCNC: 8.7 MG/DL (ref 8.4–10.2)
COLOR UR: YELLOW
EOSINOPHIL # BLD AUTO: 0.1 K/UL (ref 0–0.7)
EOSINOPHIL NFR BLD: 1.2 % (ref 0–4)
ERYTHROCYTE [DISTWIDTH] IN BLOOD BY AUTOMATED COUNT: 18.4 % (ref 11.5–14.5)
GFR NON-AFRICAN AMERICAN: > 60
GLUCOSE UR STRIP-MCNC: (no result) MG/DL
HGB BLD-MCNC: 13.1 G/DL (ref 12–16)
INR PPP: 2.6 (ref 0.9–1.2)
LEUKOCYTE ESTERASE UR-ACNC: (no result) LEU/UL
LYMPHOCYTES # BLD AUTO: 0.8 K/UL (ref 1–4.3)
LYMPHOCYTES NFR BLD AUTO: 13.3 % (ref 20–40)
MCH RBC QN AUTO: 30.2 PG (ref 27–31)
MCHC RBC AUTO-ENTMCNC: 33.6 G/DL (ref 33–37)
MCV RBC AUTO: 89.8 FL (ref 81–99)
MONOCYTES # BLD: 0.4 K/UL (ref 0–0.8)
MONOCYTES NFR BLD: 6.7 % (ref 0–10)
NEUTROPHILS # BLD: 4.7 K/UL (ref 1.8–7)
NEUTROPHILS NFR BLD AUTO: 78.7 % (ref 50–75)
NRBC BLD AUTO-RTO: 0.2 % (ref 0–0)
PH UR STRIP: 6 [PH] (ref 5–8)
PLATELET # BLD: 183 K/UL (ref 130–400)
PMV BLD AUTO: 9 FL (ref 7.2–11.7)
PROT UR STRIP-MCNC: 30 MG/DL
PROTHROMBIN TIME: 29.7 SECONDS (ref 9.8–13.1)
RBC # BLD AUTO: 4.32 MIL/UL (ref 3.8–5.2)
RBC # UR STRIP: (no result) /UL
SP GR UR STRIP: 1.01 (ref 1–1.03)
SQUAMOUS EPITHIAL: 4 /HPF (ref 0–5)
URINE CLARITY: (no result)
UROBILINOGEN UR-MCNC: (no result) MG/DL (ref 0.2–1)
WBC # BLD AUTO: 6 K/UL (ref 4.8–10.8)

## 2018-05-21 NOTE — MRI
PROCEDURE:  MR LUMBAR SPINE WITH AND WITHOUT CONTRAST



HISTORY:

BLE weakness, multiple myeloma



COMPARISON:

Lumbar spine MRI without contrast 04/10/2018. 



TECHNIQUE:

Multiecho multiplanar sequences were performed through the lumbar 

spine with and without the use of intravenous contrast.



FINDINGS:

Normal lumbar curvature appreciate without fracture or 

spondylolisthesis identified. Mildly inhomogeneous comparison is 

appreciate without suspicious focal finding.  A small Schmorl's node 

is noted at the upper endplate of as L1. Multilevel diffuse disc 

bulging is appreciated including at the inferior visualized thoracic 

levels of T11-12 and T12-L1. Conus medullaris appears normal in 

intrinsic signal terminating at L2 mid vertebral body level. 



Evaluation of the prevertebral paraspinal soft tissues reveals no 

prevertebral findings with paraspinal soft tissues unremarkable at 

the lumbar level. However, at T11-12, there are persistent edematous 

soft tissue changes suspicious for simple cyst development or even 

synovial cyst rupture related to the facet joints at T11-12 

posteriorly toward the left but not at the right. An increased amount 

of fluid is seen at the posterior epidural space at T11 and T12 

measuring 1.1 x 3.2 cm at the right greater than left. This fluid 

collection now appears to impinge the right greater than left lower 

thoracic spinal cord.  No definite cord reaction is appreciate.  Mild 

contrast enhancement is appreciated surrounding this epidural 

collection which is suspicious for an abscess though an other 

inflammatory processes possible as well. Subtle enhancement is seen 

in the left paraspinal soft tissues posteriorly. Improved imaging can 

provided by dedicated thoracic spine MRI as no axial imaging was 

carried out through this level. 



Stable subtle chronic compression fractures of L1 and L3 are seen 

once again. 







T12-L1:

Stable mild central canal stenosis without definite disc herniation 

appreciable. 



L1-2:

A disc osteophyte complex combines with facet arthropathy resulting 

in a moderate central canal stenosis posterior epidural fluid less 

apparent currently. Mild-to-moderate bilateral neural foraminal 

stenosis. No disc herniation. 



L2-3:

Disc osteophyte complex combines with facet arthropathy to cause 

moderate central canal stenosis and moderate bilateral neural 

foraminal stenosis. No disc herniation. 



L3-4:

A mild central stenosis results from a disc bulge and facet joint 

degenerative arthropathy with mild bilateral neural foraminal 

stenosis again evident.  The previously suggested anterior left 

epidural finding is not identified currently. 



L4-5:

A stable mild degenerative central canal stenosis appreciated based 

on gross facet joint arthropathy and a mild disc osteophyte complex 

without disc herniation. Mild bilateral degenerative neural foraminal 

stenoses are stable as well. 



L5-S1:

Gross facet joint degenerative changes are appreciated once again 

encroachment lateral recesses without significant central stenosis.  

No disc herniation. Mild bilateral degenerative neural foraminal 

stenoses are stable. 



OTHER FINDINGS:

None. 



IMPRESSION:

1. Multilevel degenerative lumbar spondylosis, facet joint 

arthropathy and variable degenerative spinal stenosis is reiterated 

without interval disc herniation. Findings seen worst at the upper 

lumbar spine L1-2 and L2-3 where moderate central canal stenoses are 

appreciated. 



2. Developing posterior epidural fluid collection is increased at the 

T11 and T12 posterior epidural space (1.3 x 3.2 cm) with left 

paraspinal soft tissue edema/fluid also identified with limited 

enhancement. Cord compression results without definite cord reaction 

at this time. Consider infectious or inflammatory process including 

epidural abscess. Spine surgical consultation advised. 







Findings discussed with Dr. Krause with written down and read back 

verification 05/21/2018 3:15 p.m..

## 2018-05-21 NOTE — RAD
PROCEDURE:  Bilateral Knee Radiographs.



HISTORY:

Fall



COMPARISON:

None.



FINDINGS:



BONES:

Right Knee: No acute fracture. 



Left Knee: No acute fracture. 



JOINTS:

Right Knee: Mild medial compartment narrowing. 



Left knee: Moderate medial compartment narrowing. 



SOFT TISSUES:

Right Knee: Normal.



Left Knee: Normal.



JOINT EFFUSION:

Right Knee: None. 



Left Knee: None.



OTHER FINDINGS:

None.



IMPRESSION:

No acute findings related to/accounting  for the clinical 

presentation.

## 2018-05-21 NOTE — CP.PCM.PN
Subjective





- Date & Time of Evaluation


Date of Evaluation: 05/21/18


Time of Evaluation: 17:00





- Subjective


Subjective: 





63yo femal with mult myeloma


had acute onset weakness in legs


MRI shows progression t11/12 lesion


Pt on Xeralto


ordered MRI of T and C spine as had previous disease in T spine


As pt is on xeralto it is unsafe to operate at this time, need at least 24 hrs 

off for spine surgery


Dr Chappell will follow, awaiting MRI studies 





Objective





- Vital Signs/Intake and Output


Vital Signs (last 24 hours): 


 











Temp Pulse Resp BP Pulse Ox


 


 98.1 F   86   18   119/72   98 


 


 05/21/18 15:56  05/21/18 15:56  05/21/18 15:56  05/21/18 15:56  05/21/18 16:47











- Labs


Labs: 


 





 05/21/18 12:25 





 05/21/18 12:25 





 











PT  29.7 Seconds (9.8-13.1)  H  05/21/18  12:25    


 


INR  2.6  (0.9-1.2)  H  05/21/18  12:25    


 


APTT  41.4 Seconds (25.6-37.1)  H  05/21/18  12:25

## 2018-05-21 NOTE — ED PDOC
Lower Extremity Pain/Injury


Time Seen by Provider: 05/21/18 11:57


Chief Complaint (Nursing): Lower Extremity Problem/Injury


Chief Complaint (Provider): Lower Extremity Problem/Injury


History Per: Patient


History/Exam Limitations: no limitations


Onset/Duration Of Symptoms: Days (x 1)


Current Symptoms Are (Timing): Still Present


Additional Complaint(s): 


62 year old female undergoing chemotherapy for multiple myeloma presents to the 

ED with increased leg weakness since yesterday. Patient reports she tried to 

get up from bed to use her bedside commode last night. She was unable to get 

back up from her knees and slept sitting on the floor. Patient usually 

ambulates with a walker. Both legs are weak, but the left is weaker than the 

right. She denies saddle anesthesia, trauma, head injury, loss of consciousness

, incontinence, and paraesthesias. 








PMD: Dr. Yousuf Campo 








Past Medical History


Reviewed: Historical Data, Nursing Documentation, Vital Signs


Vital Signs: 





 Last Vital Signs











Temp  98.2 F   05/21/18 11:16


 


Pulse  90   05/21/18 11:16


 


Resp  16   05/21/18 11:16


 


BP  92/65 L  05/21/18 11:16


 


Pulse Ox  98   05/21/18 11:20














- Medical History


PMH: Atrial Fibrillation, Back Problems, Cardia Arrhythmia, CHF, Fractures (rib)

, HTN, Pneumonia


   Denies: HIV, Chronic Kidney Disease


Other PMH: multiple myeloma





- Surgical History


Surgical History: Appendectomy, Cholecystectomy





- Family History


Family History: States: Unknown Family Hx





- Home Medications


Home Medications: 


 Ambulatory Orders











 Medication  Instructions  Recorded


 


Rivaroxaban [Xarelto] 20 mg PO DAILY 02/28/17


 


Lenalidomide [Revlimid] 15 mg PO DAILY 05/23/17


 


Albuterol HFA [Ventolin HFA 90 2 puff IH Q4 PRN 04/02/18





mcg/actuation (8 g)]  


 


Allopurinol [Zyloprim] 100 mg PO DAILY 04/02/18


 


Aspirin [Ecotrin] 81 mg PO DAILY 04/02/18


 


Carvedilol [Coreg] 25 mg PO Q12 04/02/18


 


Digoxin [Digitek] 125 mcg PO DAILY 04/02/18


 


Famotidine [Pepcid] 20 mg PO BID 04/02/18


 


Lisinopril [Zestril] 10 mg PO DAILY 04/02/18


 


Dexamethasone [Decadron] 20 mg PO DAILY 04/23/18


 


Furosemide [Lasix] 20 mg PO DAILY 05/21/18














- Allergies


Allergies/Adverse Reactions: 


 Allergies











Allergy/AdvReac Type Severity Reaction Status Date / Time


 


No Known Allergies Allergy   Verified 05/21/18 11:20














Review of Systems


ROS Statement: Except As Marked, All Systems Reviewed And Found Negative


Musculoskeletal: Positive for: Other (b/l leg weakness)





Physical Exam





- Reviewed


Nursing Documentation Reviewed: Yes


Vital Signs Reviewed: Yes





- Physical Exam


Appears: Positive for: Non-toxic, No Acute Distress


Head Exam: Positive for: ATRAUMATIC, NORMOCEPHALIC


Skin: Positive for: Normal Color, Warm, Dry


Eye Exam: Positive for: EOMI, Normal appearance, PERRL


Neck: Positive for: Normal, Painless ROM


Cardiovascular/Chest: Positive for: Regular Rate, Rhythm


Respiratory: Positive for: CNT, Normal Breath Sounds


Gastrointestinal/Abdominal: Positive for: Normal Exam, Soft, Other (pelvis is 

stable; no rocking)


Back: Positive for: Other (tenderness to lumbar spine (chronic) with sensation 

intact in saddle area )


Extremity: Positive for: Normal ROM (sensation intact b/l lower extremities; 

moving right LE more than left), Other (RLE: No effort against gravity, LLE: + 

lateral movement)


Neurologic/Psych: Positive for: Alert, CNs II-XII, Oriented (x 3), Motor/

Sensory Deficits.  Negative for: Aphasia, Facial Droop





- Laboratory Results


Result Diagrams: 


 05/26/18 06:30





 05/27/18 05:30





- ECG


O2 Sat by Pulse Oximetry: 98 (RA)


Pulse Ox Interpretation: Normal





- Critical Care


Total Time (In Min): 45





Medical Decision Making


Medical Decision Making: 


Time: 12:16 


Initial Plan: 


--CMP 


--Urine dip 


--CBC with diff


--PTT 


--Prothrombin time


--Knee x-ray b/l 


--K-Dur 40 meq PO 


--Urinalysis 


--Lumbar spine MRI 





Time: 12:15 


--Discussed patient with Dr. ASHER Otto who agrees with repeat MRI and recommends 

admission. 





Knee x-ray is unremarkable. 





Lumbar Spine MRI 


FINDINGS:


Normal lumbar curvature appreciate without fracture or spondylolisthesis 

identified. Mildly inhomogeneous comparison is appreciate without suspicious 

focal finding.  A small Schmorl's node is noted at the upper endplate of as L1. 

Multilevel diffuse disc bulging is appreciated including at the inferior 

visualized thoracic levels of T11-12 and T12-L1. Conus medullaris appears 

normal in intrinsic signal terminating at L2 mid vertebral body level. 





Evaluation of the prevertebral paraspinal soft tissues reveals no prevertebral 

findings with paraspinal soft tissues unremarkable at the lumbar level. However

, at T11-12, there are persistent edematous soft tissue changes suspicious for 

simple cyst development or even synovial cyst rupture related to the facet 

joints at T11-12 posteriorly toward the left but not at the right. An increased 

amount of fluid is seen at the posterior epidural space at T11 and T12 

measuring 1.1 x 3.2 cm at the right greater than left. This fluid collection 

now appears to impinge the right greater than left lower thoracic spinal cord.  

No definite cord reaction is appreciate.  Mild contrast enhancement is 

appreciated surrounding this epidural collection which is suspicious for an 

abscess though an other inflammatory processes possible as well. Subtle 

enhancement is seen in the left paraspinal soft tissues posteriorly. Improved 

imaging can provided by dedicated thoracic spine MRI as no axial imaging was 

carried out through this level. 





Stable subtle chronic compression fractures of L1 and L3 are seen once again. 








T12-L1:


Stable mild central canal stenosis without definite disc herniation 

appreciable. 





L1-2:


A disc osteophyte complex combines with facet arthropathy resulting in a 

moderate central canal stenosis posterior epidural fluid less apparent 

currently. Mild-to-moderate bilateral neural foraminal stenosis. No disc 

herniation. 





L2-3:


Disc osteophyte complex combines with facet arthropathy to cause moderate 

central canal stenosis and moderate bilateral neural foraminal stenosis. No 

disc herniation. 





L3-4:


A mild central stenosis results from a disc bulge and facet joint degenerative 

arthropathy with mild bilateral neural foraminal stenosis again evident.  The 

previously suggested anterior left epidural finding is not identified 

currently. 





L4-5:


A stable mild degenerative central canal stenosis appreciated based on gross 

facet joint arthropathy and a mild disc osteophyte complex without disc 

herniation. Mild bilateral degenerative neural foraminal stenoses are stable as 

well. 





L5-S1:


Gross facet joint degenerative changes are appreciated once again encroachment 

lateral recesses without significant central stenosis.  No disc herniation. 

Mild bilateral degenerative neural foraminal stenoses are stable. 





OTHER FINDINGS:


None. 





IMPRESSION:


1. Multilevel degenerative lumbar spondylosis, facet joint arthropathy and 

variable degenerative spinal stenosis is reiterated without interval disc 

herniation. Findings seen worst at the upper lumbar spine L1-2 and L2-3 where 

moderate central canal stenoses are appreciated. 





2. Developing posterior epidural fluid collection is increased at the T11 and 

T12 posterior epidural space (1.3 x 3.2 cm) with left paraspinal soft tissue 

edema/fluid also identified with limited enhancement. Cord compression results 

without definite cord reaction at this time. Consider infectious or 

inflammatory process including epidural abscess. Spine surgical consultation 

advised. 








Time; 15:15


--Patient will be admitted to the service of Dr. Campo. Admitting diagnosis is 

BLE weakness, UTI and multiple myeloma. 





16:30


Case discussed with Dr. Stafford (Neurosurg), recommends MRI C and T-spine, 

will consult. 








--------------------------------------------------------------------------------

-----------------


Scribe Attestation:


Documented by Belem Stoll, acting as a scribe for Leah Krause MD 





Provider Scribe Attestation:


All medical record entries made by the Scribe were at my direction and 

personally dictated by me. I have reviewed the chart and agree that the record 

accurately reflects my personal performance of the history, physical exam, 

medical decision making, and the department course for this patient. I have 

also personally directed, reviewed, and agree with the discharge instructions 

and disposition.





Disposition





- Clinical Impression


Clinical Impression: 


 Mass in epidural space, UTI (urinary tract infection), Bilateral leg weakness, 

Multiple myeloma








- Patient ED Disposition


Is Patient to be Admitted: Yes





- Disposition


Disposition Time: 16:15


Condition: GUARDED





- Pt Status Changed To:


Hospital Disposition Of: Inpatient





- Admit Certification


Admit to Inpatient:: After my assessment, the patient will require 

hospitalization for at least two midnights.  This is because of the severity of 

symptoms shown, intensity of services needed, and/or the medical risk in this 

patient being treated as an outpatient.





- POA


Present On Arrival: None

## 2018-05-22 LAB
ALBUMIN SERPL-MCNC: 2.9 G/DL (ref 3.5–5)
ALBUMIN SERPL-MCNC: 3.4 G/DL (ref 3.5–5)
ALBUMIN/GLOB SERPL: 0.9 {RATIO} (ref 1–2.1)
ALBUMIN/GLOB SERPL: 1 {RATIO} (ref 1–2.1)
ALT SERPL-CCNC: 65 U/L (ref 9–52)
ALT SERPL-CCNC: 95 U/L (ref 9–52)
APTT BLD: 32.7 SECONDS (ref 25.6–37.1)
AST SERPL-CCNC: 78 U/L (ref 14–36)
AST SERPL-CCNC: 86 U/L (ref 14–36)
BUN SERPL-MCNC: 12 MG/DL (ref 7–17)
BUN SERPL-MCNC: 13 MG/DL (ref 7–17)
CALCIUM SERPL-MCNC: 8.7 MG/DL (ref 8.4–10.2)
CALCIUM SERPL-MCNC: 8.9 MG/DL (ref 8.4–10.2)
ERYTHROCYTE [DISTWIDTH] IN BLOOD BY AUTOMATED COUNT: 18.5 % (ref 11.5–14.5)
ERYTHROCYTE [DISTWIDTH] IN BLOOD BY AUTOMATED COUNT: 18.9 % (ref 11.5–14.5)
GFR NON-AFRICAN AMERICAN: > 60
GFR NON-AFRICAN AMERICAN: > 60
HGB BLD-MCNC: 11.7 G/DL (ref 12–16)
HGB BLD-MCNC: 12.3 G/DL (ref 12–16)
INR PPP: 1.2 (ref 0.9–1.2)
MCH RBC QN AUTO: 30.4 PG (ref 27–31)
MCH RBC QN AUTO: 30.6 PG (ref 27–31)
MCHC RBC AUTO-ENTMCNC: 33.9 G/DL (ref 33–37)
MCHC RBC AUTO-ENTMCNC: 34.1 G/DL (ref 33–37)
MCV RBC AUTO: 89.6 FL (ref 81–99)
MCV RBC AUTO: 89.8 FL (ref 81–99)
PLATELET # BLD: 183 K/UL (ref 130–400)
PLATELET # BLD: 207 K/UL (ref 130–400)
PROTHROMBIN TIME: 13.8 SECONDS (ref 9.8–13.1)
RBC # BLD AUTO: 3.84 MIL/UL (ref 3.8–5.2)
RBC # BLD AUTO: 4.02 MIL/UL (ref 3.8–5.2)
WBC # BLD AUTO: 5.9 K/UL (ref 4.8–10.8)
WBC # BLD AUTO: 6.5 K/UL (ref 4.8–10.8)

## 2018-05-22 PROCEDURE — 5A0955Z ASSISTANCE WITH RESPIRATORY VENTILATION, GREATER THAN 96 CONSECUTIVE HOURS: ICD-10-PCS | Performed by: INTERNAL MEDICINE

## 2018-05-22 PROCEDURE — 30233K1 TRANSFUSION OF NONAUTOLOGOUS FROZEN PLASMA INTO PERIPHERAL VEIN, PERCUTANEOUS APPROACH: ICD-10-PCS | Performed by: INTERNAL MEDICINE

## 2018-05-22 RX ADMIN — LEVALBUTEROL SCH MG: 0.63 SOLUTION RESPIRATORY (INHALATION) at 19:07

## 2018-05-22 RX ADMIN — DIGOXIN SCH MG: 0.12 TABLET ORAL at 09:07

## 2018-05-22 RX ADMIN — IPRATROPIUM BROMIDE SCH: 0.5 SOLUTION RESPIRATORY (INHALATION) at 12:08

## 2018-05-22 RX ADMIN — POTASSIUM PHOSPHATE, MONOBASIC AND POTASSIUM PHOSPHATE, DIBASIC SCH: 224; 236 INJECTION, SOLUTION, CONCENTRATE INTRAVENOUS at 20:30

## 2018-05-22 RX ADMIN — IPRATROPIUM BROMIDE SCH MG: 0.5 SOLUTION RESPIRATORY (INHALATION) at 19:06

## 2018-05-22 RX ADMIN — LEVALBUTEROL SCH: 0.63 SOLUTION RESPIRATORY (INHALATION) at 14:07

## 2018-05-22 RX ADMIN — IPRATROPIUM BROMIDE SCH MG: 0.5 SOLUTION RESPIRATORY (INHALATION) at 14:06

## 2018-05-22 RX ADMIN — POTASSIUM PHOSPHATE, MONOBASIC AND POTASSIUM PHOSPHATE, DIBASIC SCH MLS/HR: 224; 236 INJECTION, SOLUTION, CONCENTRATE INTRAVENOUS at 20:29

## 2018-05-22 RX ADMIN — LEVALBUTEROL SCH MG: 0.63 SOLUTION RESPIRATORY (INHALATION) at 12:47

## 2018-05-22 NOTE — CP.PCM.HP
History of Present Illness





- History of Present Illness


History of Present Illness: 





62  yrs  old  female PMHx  AFib , anticoagulated  with Xarelto , HTN , CHF , 

Multiple  Myeloma , AD  on  4-10  [facility] for severe  low  back pain, not  

relieved  by Percocet , Patient was  with no motor or  sensory  deficit , no 

fecal or  urinary  incontinence, no saddle anesthesia , MRI 4-11 no spinal cord

  compresion , diffuse  leptomeningeal thickening, malignancy vs inflammation ,

Neurosurgery  did not  recommend , Bx , Anesthesia did not recommend Lumbar  

Spinal tap , Patient  blood  C-S  came  back  Staph Aureus , ID recommended 

Vanco  IV  for   4 weeks , , Hematology started  Chemotherapy 4-16-18 and  

Patient   was  transferred to [facility]  to continue ATB treatment and  PT,  

Patient  was  DD [facility] 5-12 , Patient   was  having  leg  weakness , yesterday 

5-21 -18 at  aprox.  1am Patient went  out  of  bed  to the  commode and  fell 

down , there after  Patient was  unable  to move Left  L/E also weakness  Left L

/E , Patient  was  in the  floor   aprox   to 8 am , Family came  and  Patient  

was  brought  by EMS  to ER.


Lumbar  Spine  MRI multilevel lumbar  spondylosis , spinal stenosis , no 

interval disc herniation , possible  epidural fluid  collection increased T-11, 

T 12 posterior  epidural space , Left  paraspinal soft  tissue edema-fluid ,  

cord  compression result without  definive  cord  reaction , consider  

infectious , inflammatory process including epidural abcess.


Neurosurgoical consult :  MRI  progressive  T11 , T 12 lesion , Patient  on 

Xarelto , last  dosis earlier  yesterday , need  24 hs  off for spine  surgery


Hematology consult : Multiple  Myeloma , spinal cord  compression , transfuse  

FFP before  Surgery








Present on Admission





- Present on Admission


Any Indicators Present on Admission: No





Review of Systems





- Constitutional


Constitutional: Other (neg)





- EENT


Eyes: Other (neg)


Nose/Mouth/Throat: Other (neg)





- Cardiovascular


Cardiovascular: Irregular Heart Rhythm





- Respiratory


Respiratory: Cough





- Gastrointestinal


Gastrointestinal: Other (neg)





- Genitourinary


Genitourinary: Other (neg)





- Musculoskeletal


Musculoskeletal: Arthralgias, Back Pain, Muscle Weakness





- Integumentary


Integumentary: Other





- Neurological


Neurological: Weakness (L/E L>R)





- Endocrine


Endocrine: Other (neg)





- Hematologic/Lymphatic


Hematologic: Other (neg)





Past Patient History





- Infectious Disease


Hx of Infectious Diseases: None





- Tetanus Immunizations


Tetanus Immunization: Unknown





- Past Medical History & Family History


Past Medical History?: Yes





- Past Social History


Smoking Status: Never Smoked





- CARDIAC


Hx Cardiac Disorders: Yes


Hx Atrial Fibrillation: Yes


Hx Congestive Heart Failure: Yes


Hx Hypertension: Yes





- PULMONARY


Hx Respiratory Disorders: Yes


Hx Pneumonia: Yes





- NEUROLOGICAL


Hx Neurological Disorder: No





- HEENT


Hx HEENT Problems: No





- RENAL


Hx Chronic Kidney Disease: No





- ENDOCRINE/METABOLIC


Hx Endocrine Disorders: No





- HEMATOLOGICAL/ONCOLOGICAL


Hx AIDS: No


Hx Human Immunodeficiency Virus (HIV): No


Other/Comment: Multiple  Myeloma





- INTEGUMENTARY


Hx Dermatological Problems: No





- MUSCULOSKELETAL/RHEUMATOLOGICAL


Hx Musculoskeletal Disorders: Yes


Hx Falls: Yes


Hx Unsteady Gait: Yes





- GASTROINTESTINAL


Hx Gastrointestinal Disorders: No





- GENITOURINARY/GYNECOLOGICAL


Hx Genitourinary Disorders: Yes


Hx Urinary Tract Infection: Yes


Other/Comment: Uterine Fibroids





- PSYCHIATRIC


Hx Psychophysiologic Disorder: No


Hx Substance Use: No





- SURGICAL HISTORY


Hx Surgeries: Yes


Hx Appendectomy: Yes


Hx Cholecystectomy: Yes





- ANESTHESIA


Hx Anesthesia: Yes


Hx Anesthesia Reactions: No


Hx Malignant Hyperthermia: No





Meds


Allergies/Adverse Reactions: 


 Allergies











Allergy/AdvReac Type Severity Reaction Status Date / Time


 


No Known Allergies Allergy   Verified 05/21/18 11:20














Physical Exam





- Constitutional


Appears: No Acute Distress





- Head Exam


Head Exam: NORMAL INSPECTION





- Eye Exam


Eye Exam: PERRL





- ENT Exam


ENT Exam: Normal Exam





- Neck Exam


Neck exam: Positive for: Normal Inspection





- Respiratory Exam


Respiratory Exam: Rhonchi


Additional comments: 





feew  scattered





- Cardiovascular Exam


Cardiovascular Exam: Irregular Rhythm





- GI/Abdominal Exam


GI & Abdominal Exam: Normal Bowel Sounds, Soft





- Extremities Exam


Extremities exam: Positive for: tenderness (L knee , abrassion  L knee)





- Back Exam


Back exam: tenderness





- Neurological Exam


Neurological exam: Alert, CN II-XII Intact, Oriented x3


Additional comments: 





unable  to move  Left  lower  extremity, minimal movement   Right  lower 

extremity , sensory negative , Plantar  reflex  equivocal





- Psychiatric Exam


Psychiatric exam: Normal Affect





Results





- Vital Signs


Recent Vital Signs: 





 Last Vital Signs











Temp  98.9 F   05/22/18 07:43


 


Pulse  82   05/22/18 09:10


 


Resp  19   05/22/18 07:43


 


BP  130/84   05/22/18 09:10


 


Pulse Ox  98   05/22/18 07:43














- Labs


Result Diagrams: 


 05/24/18 04:30





 05/25/18 05:35


Labs: 





 Laboratory Results - last 24 hr











  05/21/18 05/21/18 05/21/18





  11:41 12:25 12:25


 


WBC   6.0 


 


RBC   4.32 


 


Hgb   13.1 


 


Hct   38.8 


 


MCV   89.8 


 


MCH   30.2 


 


MCHC   33.6 


 


RDW   18.4 H 


 


Plt Count   183 


 


MPV   9.0 


 


Neut % (Auto)   78.7 H 


 


Lymph % (Auto)   13.3 L 


 


Mono % (Auto)   6.7 


 


Eos % (Auto)   1.2 


 


Baso % (Auto)   0.1 


 


Neut # (Auto)   4.7 


 


Lymph # (Auto)   0.8 L 


 


Mono # (Auto)   0.4 


 


Eos # (Auto)   0.1 


 


Baso # (Auto)   0.0 


 


PT   


 


INR   


 


APTT   


 


Sodium    138


 


Potassium    3.1 L


 


Chloride    101


 


Carbon Dioxide    26


 


Anion Gap    14


 


BUN    15


 


Creatinine    0.3 L


 


Est GFR ( Amer)    > 60


 


Est GFR (Non-Af Amer)    > 60


 


POC Glucose (mg/dL)  109  


 


Random Glucose    141 H


 


Calcium    8.7


 


Total Bilirubin    0.9


 


AST    24


 


ALT    60 H


 


Alkaline Phosphatase    99


 


Total Protein    6.3


 


Albumin    3.2 L


 


Globulin    3.1


 


Albumin/Globulin Ratio    1.0


 


Urine Color   


 


Urine Clarity   


 


Urine pH   


 


Ur Specific Gravity   


 


Urine Protein   


 


Urine Glucose (UA)   


 


Urine Ketones   


 


Urine Blood   


 


Urine Nitrate   


 


Urine Bilirubin   


 


Urine Urobilinogen   


 


Ur Leukocyte Esterase   


 


Urine RBC (Auto)   


 


Urine Microscopic WBC   


 


Ur Squamous Epith Cells   


 


Urine Bacteria   


 


Crossmatch   


 


BBK History Checked   














  05/21/18 05/21/18 05/22/18





  12:25 13:45 04:00


 


WBC    5.9


 


RBC    4.02


 


Hgb    12.3


 


Hct    36.0


 


MCV    89.6


 


MCH    30.6


 


MCHC    34.1


 


RDW    18.5 H


 


Plt Count    183


 


MPV   


 


Neut % (Auto)   


 


Lymph % (Auto)   


 


Mono % (Auto)   


 


Eos % (Auto)   


 


Baso % (Auto)   


 


Neut # (Auto)   


 


Lymph # (Auto)   


 


Mono # (Auto)   


 


Eos # (Auto)   


 


Baso # (Auto)   


 


PT  29.7 H  


 


INR  2.6 H  


 


APTT  41.4 H  


 


Sodium   


 


Potassium   


 


Chloride   


 


Carbon Dioxide   


 


Anion Gap   


 


BUN   


 


Creatinine   


 


Est GFR ( Amer)   


 


Est GFR (Non-Af Amer)   


 


POC Glucose (mg/dL)   


 


Random Glucose   


 


Calcium   


 


Total Bilirubin   


 


AST   


 


ALT   


 


Alkaline Phosphatase   


 


Total Protein   


 


Albumin   


 


Globulin   


 


Albumin/Globulin Ratio   


 


Urine Color   Yellow 


 


Urine Clarity   Turbid 


 


Urine pH   6.0 


 


Ur Specific [place]   1.011 


 


Urine Protein   30 


 


Urine Glucose (UA)   Neg 


 


Urine Ketones   Negative 


 


Urine Blood   Large 


 


Urine Nitrate   Negative 


 


Urine Bilirubin   Negative 


 


Urine Urobilinogen   0.2-1.0 


 


Ur Leukocyte Esterase   Large 


 


Urine RBC (Auto)   793 H 


 


Urine Microscopic WBC   958 H 


 


Ur Squamous Epith Cells   4 


 


Urine Bacteria   Occ H 


 


Crossmatch   


 


BBK History Checked   














  05/22/18 05/22/18





  09:00 10:15


 


WBC  


 


RBC  


 


Hgb  


 


Hct  


 


MCV  


 


MCH  


 


MCHC  


 


RDW  


 


Plt Count  


 


MPV  


 


Neut % (Auto)  


 


Lymph % (Auto)  


 


Mono % (Auto)  


 


Eos % (Auto)  


 


Baso % (Auto)  


 


Neut # (Auto)  


 


Lymph # (Auto)  


 


Mono # (Auto)  


 


Eos # (Auto)  


 


Baso # (Auto)  


 


PT  


 


INR  


 


APTT  


 


Sodium  137 


 


Potassium  3.4 L 


 


Chloride  101 


 


Carbon Dioxide  25 


 


Anion Gap  14 


 


BUN  13 


 


Creatinine  0.3 L 


 


Est GFR ( Amer)  > 60 


 


Est GFR (Non-Af Amer)  > 60 


 


POC Glucose (mg/dL)  


 


Random Glucose  106 H 


 


Calcium  8.7 


 


Total Bilirubin  1.0 


 


AST  86 H D 


 


ALT  65 H 


 


Alkaline Phosphatase  103 


 


Total Protein  6.1 L 


 


Albumin  2.9 L 


 


Globulin  3.2 


 


Albumin/Globulin Ratio  0.9 L 


 


Urine Color  


 


Urine Clarity  


 


Urine pH  


 


Ur Specific Gravity  


 


Urine Protein  


 


Urine Glucose (UA)  


 


Urine Ketones  


 


Urine Blood  


 


Urine Nitrate  


 


Urine Bilirubin  


 


Urine Urobilinogen  


 


Ur Leukocyte Esterase  


 


Urine RBC (Auto)  


 


Urine Microscopic WBC  


 


Ur Squamous Epith Cells  


 


Urine Bacteria  


 


Crossmatch   See Detail


 


BBK History Checked   Patient has bt














Assessment & Plan


(1) Spinal cord compression


Status: Acute   Priority: High   





(2) Multiple myeloma


Status: Chronic   Priority: High   





(3) Afib


Status: Chronic   Priority: High   





(4) HTN (hypertension)


Status: Chronic   Priority: Medium   





(5) Hypokalemia


Status: Acute   Priority: High   





(6) Urinary tract infection


Status: Acute   





(7) Coagulopathy


Status: Acute   Priority: High   





- Assessment and Plan (Free Text)


Plan: 





Transfuse FFP, correction  of  PT , PTT . INR , correction of   Hypokalemia , 

Ceftriazone , Cardiology clearance prior  to  OR

## 2018-05-22 NOTE — CP.PCM.PN
Subjective





- Date & Time of Evaluation


Date of Evaluation: 05/22/18


Time of Evaluation: 09:14





- Subjective


Subjective: 





This is a 62 yrs old female who has had multiple myeloid diagnosed about 3 yrs 

ago. She had a left upper lobe mass, and skeletal metastasis even at that 

rakeLd0e was started on chemotherapy,with bortezomib and decadron therapy and 

pt was doing well.. The myeloma was responding but she was beginning to have 

some neuropathy, so she was changed over to kyprolis + decadron and she had a 

good response . She was started on revlimid as maintainence and she did fairly 

well until 6 weeks ago when she was admitted for some weakness of the lower 

extremities.  MRI showed no evidence of compression, but there was thickening 

of the spinal cord which was interpreted as either leptomeningeal involvement 

with the myeloma or infectoious  process. . She was given antibiotics for 4 

weeks via PICC line. After the antibiotics were done the picc line was removed 

and her chemo continued,. She was due to come back today for the chemo.but she 

fell in the bathroom and was unable to get up. She could not move her left leg 

at all, and the right leg is weak as well. She had a MRI in the Er and it 

showed that she had a fluid collection in the epidural space at the T11 ad T12 

level.This is causing compression of the cord. There is no reaction in the cord 

at this time , but  she cannot move her left leg at all and the right lower 

extremity is getting weaker.


She has a past h/o atrial fibrillation, but this has been stable, she also has 

chronic CHF, and DM.





Objective





- Vital Signs/Intake and Output


Vital Signs (last 24 hours): 


 











Temp Pulse Resp BP Pulse Ox


 


 98.9 F   82   19   130/84   98 


 


 05/22/18 07:43  05/22/18 09:10  05/22/18 07:43  05/22/18 09:10  05/22/18 07:43











- Medications


Medications: 


 Current Medications





Albuterol (Ventolin Hfa 90 Mcg/Actuation (8 G))  2 puff IH Q4 PRN


   PRN Reason: Shortness of Breath


Allopurinol (Zyloprim)  100 mg PO DAILY [facility]


Carvedilol (Coreg)  25 mg PO Q12 [facility]


   Last Admin: 05/22/18 09:09 Dose:  25 mg


Dexamethasone (Decadron)  20 mg PO DAILY [facility]


Digoxin (Digoxin)  0.125 mg PO DAILY [facility]


   Last Admin: 05/22/18 09:07 Dose:  0.125 mg


Famotidine (Pepcid)  20 mg PO BID [facility]


   Last Admin: 05/22/18 09:10 Dose:  20 mg


Furosemide (Lasix)  20 mg PO DAILY [facility]


Home Med (Lenalidomide [Revlimid])  15 mg PO DAILY [facility]


   Last Admin: 05/22/18 09:07 Dose:  15 mg


Ceftriaxone Sodium 1 gm/ (Sodium Chloride)  100 mls @ 100 mls/hr IVPB DAILY [facility]


   PRN Reason: Protocol


Lisinopril (Zestril)  10 mg PO DAILY [facility]


   Last Admin: 05/22/18 09:10 Dose:  10 mg


Morphine Sulfate (Morphine)  2 mg IVP Q4 PRN


   PRN Reason: Pain, moderate (4-7)


   Last Admin: 05/21/18 21:26 Dose:  2 mg


Nystatin (Nystop Topical Powder)  1 applic TOP TID [facility]











- Labs


Labs: 


 





 05/22/18 04:00 





 05/21/18 12:25 





 











PT  29.7 Seconds (9.8-13.1)  H  05/21/18  12:25    


 


INR  2.6  (0.9-1.2)  H  05/21/18  12:25    


 


APTT  41.4 Seconds (25.6-37.1)  H  05/21/18  12:25    














- Additional Findings


Additional findings: 





Physical exam; Alert,well oriented in no acute distress, obese


neck supple, no mass, no adenopathy


Chest; Air entry good, occ rales in the bases


Heart; RSR, no murmur


Abd; Soft, obese, no mass palpable.


EXT; Pt is unable to move her left extremity, right only a little.





Assessment and Plan





- Assessment and Plan (Free Text)


Assessment: 





Impression;  Multiple myeloma with skeletal metastasis, and now a collection of 

fluid ?ruptured disc, ? abscess.


               Spinal cord compression


Plan: 





Plan; Will give her another run of potassium so K can be in the normal range. 

She has been off the xarelto for more than 24 hrs


She will be given 2 units of fresh frozen plasma prior to the spine surgery 

which is scheduled for this afternoon. 


She will get a lifeport placed after the spine surgery to continue her 

chemotherapy

## 2018-05-22 NOTE — CP.PCM.CON
History of Present Illness





- History of Present Illness


History of Present Illness: 





SPINE CONSULT





Pt seen and examined. Full consult dictated. Rec decompressive laminectomy at 

T11-12. Have advised family this may not result in partial/complete 

neurological recovery, especially given delay secondary to her elevated INR 





Past Patient History





- Infectious Disease


Hx of Infectious Diseases: None





- Tetanus Immunizations


Tetanus Immunization: Unknown





- Past Medical History & Family History


Past Medical History?: Yes





- Past Social History


Smoking Status: Never Smoked





- CARDIAC


Hx Cardiac Disorders: Yes


Hx Atrial Fibrillation: Yes


Hx Congestive Heart Failure: Yes


Hx Hypertension: Yes





- PULMONARY


Hx Respiratory Disorders: Yes


Hx Pneumonia: Yes





- NEUROLOGICAL


Hx Neurological Disorder: No





- HEENT


Hx HEENT Problems: No





- RENAL


Hx Chronic Kidney Disease: No





- ENDOCRINE/METABOLIC


Hx Endocrine Disorders: No





- HEMATOLOGICAL/ONCOLOGICAL


Hx AIDS: No


Hx Human Immunodeficiency Virus (HIV): No


Other/Comment: Multiple  Myeloma





- INTEGUMENTARY


Hx Dermatological Problems: No





- MUSCULOSKELETAL/RHEUMATOLOGICAL


Hx Musculoskeletal Disorders: Yes


Hx Falls: Yes


Hx Unsteady Gait: Yes





- GASTROINTESTINAL


Hx Gastrointestinal Disorders: No





- GENITOURINARY/GYNECOLOGICAL


Hx Genitourinary Disorders: Yes


Hx Urinary Tract Infection: Yes


Other/Comment: Uterine Fibroids





- PSYCHIATRIC


Hx Psychophysiologic Disorder: No


Hx Substance Use: No





- SURGICAL HISTORY


Hx Surgeries: Yes


Hx Appendectomy: Yes


Hx Cholecystectomy: Yes





- ANESTHESIA


Hx Anesthesia: Yes


Hx Anesthesia Reactions: No


Hx Malignant Hyperthermia: No





Meds


Allergies/Adverse Reactions: 


 Allergies











Allergy/AdvReac Type Severity Reaction Status Date / Time


 


No Known Allergies Allergy   Verified 05/21/18 11:20














- Medications


Medications: 


 Current Medications





Albuterol (Ventolin Hfa 90 Mcg/Actuation (8 G))  2 puff IH Q4 PRN


   PRN Reason: Shortness of Breath


Allopurinol (Zyloprim)  100 mg PO DAILY [facility]


   Last Admin: 05/22/18 11:48 Dose:  100 mg


Carvedilol (Coreg)  25 mg PO Q12 [facility]


   Last Admin: 05/22/18 09:09 Dose:  25 mg


Dexamethasone (Decadron)  20 mg PO DAILY [facility]


   Last Admin: 05/22/18 14:37 Dose:  20 mg


Digoxin (Digoxin)  0.125 mg PO DAILY [facility]


   Last Admin: 05/22/18 09:07 Dose:  0.125 mg


Famotidine (Pepcid)  20 mg PO BID [facility]


   Last Admin: 05/22/18 09:10 Dose:  20 mg


Furosemide (Lasix)  20 mg PO DAILY [facility]


   Last Admin: 05/22/18 11:48 Dose:  20 mg


Home Med (Lenalidomide [Revlimid])  15 mg PO DAILY [facility]


   Last Admin: 05/22/18 09:07 Dose:  15 mg


Ceftriaxone Sodium 1 gm/ (Sodium Chloride)  100 mls @ 100 mls/hr IVPB DAILY [facility]


   PRN Reason: Protocol


Ipratropium Bromide (Atrovent)  0.5 mg IH RQ6 [facility]


Levalbuterol HCl (Xopenex)  0.63 mg INH RQ6 [facility]


   Last Admin: 05/22/18 12:47 Dose:  0.63 mg


Lisinopril (Zestril)  10 mg PO DAILY [facility]


   Last Admin: 05/22/18 09:10 Dose:  10 mg


Morphine Sulfate (Morphine)  2 mg IVP Q4 PRN


   PRN Reason: Pain, moderate (4-7)


   Last Admin: 05/21/18 21:26 Dose:  2 mg


Nystatin (Nystop Topical Powder)  1 applic TOP TID [facility]











Results





- Vital Signs


Recent Vital Signs: 


 Last Vital Signs











Temp  98.7 F   05/22/18 16:01


 


Pulse  98 H  05/22/18 16:01


 


Resp  20   05/22/18 16:01


 


BP  118/80   05/22/18 16:01


 


Pulse Ox  95   05/22/18 16:01














- Labs


Result Diagrams: 


 05/22/18 04:00





 05/22/18 09:00


Labs: 


 Laboratory Results - last 24 hr











  05/21/18 05/22/18 05/22/18





  11:41 04:00 09:00


 


WBC   5.9 


 


RBC   4.02 


 


Hgb   12.3 


 


Hct   36.0 


 


MCV   89.6 


 


MCH   30.6 


 


MCHC   34.1 


 


RDW   18.5 H 


 


Plt Count   183 


 


Sodium    137


 


Potassium    3.4 L


 


Chloride    101


 


Carbon Dioxide    25


 


Anion Gap    14


 


BUN    13


 


Creatinine    0.3 L


 


Est GFR ( Amer)    > 60


 


Est GFR (Non-Af Amer)    > 60


 


POC Glucose (mg/dL)  109  


 


Random Glucose    106 H


 


Calcium    8.7


 


Total Bilirubin    1.0


 


AST    86 H D


 


ALT    65 H


 


Alkaline Phosphatase    103


 


Total Protein    6.1 L


 


Albumin    2.9 L


 


Globulin    3.2


 


Albumin/Globulin Ratio    0.9 L


 


Blood Type   


 


Antibody Screen   


 


Crossmatch   


 


BBK History Checked   














  05/22/18





  10:15


 


WBC 


 


RBC 


 


Hgb 


 


Hct 


 


MCV 


 


MCH 


 


MCHC 


 


RDW 


 


Plt Count 


 


Sodium 


 


Potassium 


 


Chloride 


 


Carbon Dioxide 


 


Anion Gap 


 


BUN 


 


Creatinine 


 


Est GFR ( Amer) 


 


Est GFR (Non-Af Amer) 


 


POC Glucose (mg/dL) 


 


Random Glucose 


 


Calcium 


 


Total Bilirubin 


 


AST 


 


ALT 


 


Alkaline Phosphatase 


 


Total Protein 


 


Albumin 


 


Globulin 


 


Albumin/Globulin Ratio 


 


Blood Type  O POSITIVE


 


Antibody Screen  Negative


 


Crossmatch  See Detail


 


BBK History Checked  Patient has bt

## 2018-05-22 NOTE — CARD
--------------- APPROVED REPORT --------------





EKG Measurement

Heart [id number]

[id number]

[id number]

[id number]



<Conclusion>

Atrial fibrillation

Nonspecific ST and T wave abnormality

Abnormal ECG

## 2018-05-22 NOTE — RAD
HISTORY:

for OR @2pm  



COMPARISON:

04/12/2018.



TECHNIQUE:

Chest PA and lateral



FINDINGS:



LUNGS:

No active pulmonary disease.  Rotation accentuates pulmonary markings 

in the right lung.



PLEURA:

No significant pleural effusion identified. No pneumothorax apparent.



CARDIOVASCULAR:

 No radiographic findings to suggest acute or significant 

cardiovascular disease.



OSSEOUS STRUCTURES:

No significant abnormalities. Stable healed posterior lateral right 

rib fractures. 



VISUALIZED UPPER ABDOMEN:

Normal.



OTHER FINDINGS:

None.



IMPRESSION:

No active disease. No significant interval change compared to the 

prior examination(s).

## 2018-05-22 NOTE — CP.PCM.CON
History of Present Illness





- History of Present Illness


History of Present Illness: 





                                                                               

          This 62-year-old female with a long history of atrial fibrillation 

with congestive cardiac failure which was diastolic and left ventricular and 

chronic is hospitalized with cord compression and unable to move her left lower 

extremity. The patient has a long history of multiple myeloma for which she has 

been treated with various chemotherapeutic agents. She was recently 

hospitalized with acute pulmonary edema following a meal containing excessive 

salt. She is being managed with oral diabetics, rate controlled and 

anticoagulation.


She has no history of diabetes of smoking and has never suffered a myocardial 

infarction. A recent echocardiogram demonstrates preserved left ventricular 

systolic function with a markedly dilated left atrium as a consequence of left 

ventricular diastolic dysfunction.


Physical examination shows an elderly pleasant female who is in the process of 

receiving fresh frozen plasma to rectify coagulopathy. She is mildly dyspneic 

with a respiratory rate of 18-20 breaths per minute but can carry on a 

conversation. She had a heart rate off 92 bpm irregularly irregular and a blood 

pressure of 128/74 mmHg. Her jugular venous pressure was not elevated and there 

was no edema over lower extremities. Her extremities were warm and nailbeds 

were pink. There was no central or peripheral cyanosis. There was no clubbing. 

The apex was not palpable. The first heart sound had a variable intensity of 

the second heart sound was normal. There were few scattered rales at both 

bases. Her pulse oximetry was 96%. Abdomen was soft and liver and spleen were 

not palpable.


Her electrocardiogram showed atrial fibrillation with moderate heart rate. 

There were no Q waves on the electric cardiogram. Her echocardiogram off April 

hospitalization showed a preserved left ventricular systolic function with a 

markedly enlarged left atrium.


Her lab data was noted. Her BUN/creatinine were normal. Her serum potassium is 

mildly depressed and replacement has been ordered. Her hemoglobin and 

hematocrit were normal.


Her oral anticoagulation has been withheld since yesterday in anticipation of 

surgery tomorrow. Her INR is elevated and she is receiving fresh frozen plasma 

to rectify it.





Impression: Cord compression with paraparesis. Chronic atrial fibrillation with 

congestive cardiac failure which is left ventricular diastolic and chronic. She 

is receiving large volume in the form of FFP. She will receive an additional 

Diuretics to relieve her of her volume overload. I'm giving her an additional 

dose of digoxin to control her heart rate. Potassium replacement has been 

ordered.


The patient appears medically stable for her planned surgery. I will reevaluate 

her tomorrow morning prior to her surgery.





Past Patient History





- Infectious Disease


Hx of Infectious Diseases: None





- Tetanus Immunizations


Tetanus Immunization: Unknown





- Past Medical History & Family History


Past Medical History?: Yes





- Past Social History


Smoking Status: Never Smoked





- CARDIAC


Hx Cardiac Disorders: Yes


Hx Atrial Fibrillation: Yes


Hx Congestive Heart Failure: Yes


Hx Hypertension: Yes





- PULMONARY


Hx Respiratory Disorders: Yes


Hx Pneumonia: Yes





- NEUROLOGICAL


Hx Neurological Disorder: No





- HEENT


Hx HEENT Problems: No





- RENAL


Hx Chronic Kidney Disease: No





- ENDOCRINE/METABOLIC


Hx Endocrine Disorders: No





- HEMATOLOGICAL/ONCOLOGICAL


Hx AIDS: No


Hx Human Immunodeficiency Virus (HIV): No


Other/Comment: Multiple  Myeloma





- INTEGUMENTARY


Hx Dermatological Problems: No





- MUSCULOSKELETAL/RHEUMATOLOGICAL


Hx Musculoskeletal Disorders: Yes


Hx Falls: Yes


Hx Unsteady Gait: Yes





- GASTROINTESTINAL


Hx Gastrointestinal Disorders: No





- GENITOURINARY/GYNECOLOGICAL


Hx Genitourinary Disorders: Yes


Hx Urinary Tract Infection: Yes


Other/Comment: Uterine Fibroids





- PSYCHIATRIC


Hx Psychophysiologic Disorder: No


Hx Substance Use: No





- SURGICAL HISTORY


Hx Surgeries: Yes


Hx Appendectomy: Yes


Hx Cholecystectomy: Yes





- ANESTHESIA


Hx Anesthesia: Yes


Hx Anesthesia Reactions: No


Hx Malignant Hyperthermia: No





Meds


Allergies/Adverse Reactions: 


 Allergies











Allergy/AdvReac Type Severity Reaction Status Date / Time


 


No Known Allergies Allergy   Verified 05/21/18 11:20














- Medications


Medications: 


 Current Medications





Albuterol (Ventolin Hfa 90 Mcg/Actuation (8 G))  2 puff IH Q4 PRN


   PRN Reason: Shortness of Breath


Allopurinol (Zyloprim)  100 mg PO DAILY [facility]


   Last Admin: 05/22/18 11:48 Dose:  100 mg


Carvedilol (Coreg)  25 mg PO Q12 [facility]


   Last Admin: 05/22/18 09:09 Dose:  25 mg


Dexamethasone (Decadron)  20 mg PO DAILY [facility]


Digoxin (Digoxin)  0.125 mg PO DAILY [facility]


   Last Admin: 05/22/18 09:07 Dose:  0.125 mg


Digoxin (Digoxin)  0.125 mg PO ONCE ONE


   Stop: 05/22/18 14:28


Famotidine (Pepcid)  20 mg PO BID [facility]


   Last Admin: 05/22/18 09:10 Dose:  20 mg


Furosemide (Lasix)  20 mg PO DAILY [facility]


   Last Admin: 05/22/18 11:48 Dose:  20 mg


Home Med (Lenalidomide [Revlimid])  15 mg PO DAILY [facility]


   Last Admin: 05/22/18 09:07 Dose:  15 mg


Ceftriaxone Sodium 1 gm/ (Sodium Chloride)  100 mls @ 100 mls/hr IVPB DAILY [facility]


   PRN Reason: Protocol


Ipratropium Bromide (Atrovent)  0.5 mg IH RQ6 [facility]


Levalbuterol HCl (Xopenex)  0.63 mg INH RQ6 [facility]


   Last Admin: 05/22/18 12:47 Dose:  0.63 mg


Lisinopril (Zestril)  10 mg PO DAILY [facility]


   Last Admin: 05/22/18 09:10 Dose:  10 mg


Morphine Sulfate (Morphine)  2 mg IVP Q4 PRN


   PRN Reason: Pain, moderate (4-7)


   Last Admin: 05/21/18 21:26 Dose:  2 mg


Nystatin (Nystop Topical Powder)  1 applic TOP TID [facility]











Results





- Vital Signs


Recent Vital Signs: 


 Last Vital Signs











Temp  98.9 F   05/22/18 07:43


 


Pulse  82   05/22/18 12:52


 


Resp  19   05/22/18 07:43


 


BP  104/62   05/22/18 11:48


 


Pulse Ox  98   05/22/18 07:43














- Labs


Result Diagrams: 


 05/22/18 04:00





 05/22/18 09:00


Labs: 


 Laboratory Results - last 24 hr











  05/21/18 05/22/18 05/22/18





  11:41 04:00 09:00


 


WBC   5.9 


 


RBC   4.02 


 


Hgb   12.3 


 


Hct   36.0 


 


MCV   89.6 


 


MCH   30.6 


 


MCHC   34.1 


 


RDW   18.5 H 


 


Plt Count   183 


 


Sodium    137


 


Potassium    3.4 L


 


Chloride    101


 


Carbon Dioxide    25


 


Anion Gap    14


 


BUN    13


 


Creatinine    0.3 L


 


Est GFR ( Amer)    > 60


 


Est GFR (Non-Af Amer)    > 60


 


POC Glucose (mg/dL)  109  


 


Random Glucose    106 H


 


Calcium    8.7


 


Total Bilirubin    1.0


 


AST    86 H D


 


ALT    65 H


 


Alkaline Phosphatase    103


 


Total Protein    6.1 L


 


Albumin    2.9 L


 


Globulin    3.2


 


Albumin/Globulin Ratio    0.9 L


 


Blood Type   


 


Antibody Screen   


 


Crossmatch   


 


BBK History Checked   














  05/22/18





  10:15


 


WBC 


 


RBC 


 


Hgb 


 


Hct 


 


MCV 


 


MCH 


 


MCHC 


 


RDW 


 


Plt Count 


 


Sodium 


 


Potassium 


 


Chloride 


 


Carbon Dioxide 


 


Anion Gap 


 


BUN 


 


Creatinine 


 


Est GFR ( Amer) 


 


Est GFR (Non-Af Amer) 


 


POC Glucose (mg/dL) 


 


Random Glucose 


 


Calcium 


 


Total Bilirubin 


 


AST 


 


ALT 


 


Alkaline Phosphatase 


 


Total Protein 


 


Albumin 


 


Globulin 


 


Albumin/Globulin Ratio 


 


Blood Type  O POSITIVE


 


Antibody Screen  Negative


 


Crossmatch  See Detail


 


BBK History Checked  Patient has bt

## 2018-05-22 NOTE — MRI
PROCEDURE:  MR THORACIC SPINE WITHOUT CONTRAST



HISTORY:

BLE weakness



COMPARISON:

Thoracic spine MRI dated 04/10/2018 with and without contrast. 



TECHNIQUE:

Multiecho multiplanar sequences were performed through the thoracic 

spine without the use of intravenous contrast.



FINDINGS:



ALIGNMENT:

A kyphotic deformity from multiple mid to inferior thoracic spinal 

anterior wedge compression fractures is maintained with 2 worse 

fracture remaining at the T6 level appearing severe once again. No 

definite suspicious marrow signal changes are appreciated although 

diffuse disc desiccation is again evident.  There is no interval 

spondylolisthesis with the overall thoracic spinal cord signal 

normal. 



At the T12-L1 level, however, there is now cord compression caused by 

a complex cystic lesion with peripheral enhancement seen in the prior 

lumbar spine MRI also performed 05/21/2018 suspicious for an abscess 

or other proteinaceous lesion appears soft tissue lesion is not 

completely excluded. It measures approximately 1.7 x 81.1 x 4.4 cm 

While this is a significant interval finding, the overall appearance 

of the posterior epidural space sedated appears significantly 

improved in the interval with more normal appearing thin epidural 

pattern above and below the cord compression level.  Thickening of 

the epidural space leading to the collection spans from the inferior 

T11 to mid L1 level.  A small fluid collection is identified related 

to the T12-1 left facet joint potentially representing rupture 

synovial cyst.  Is difficult to differentiate between this fluid and 

the epidural fluid at the posterior epidural space however. 

Inhomogeneous enhancement is seen related to this posterior 

paraspinal soft tissue. 



No cord signal abnormality is appreciated throughout the thoracic 

spinal cord including at the T12-L1 disc interspace level despite 

moderate to severe compression the conus appearing unremarkable once 

again, terminating at L2-3 disc interspace level. Motion artifacts 

obscure evaluation in the axial view. Note, the L5 vertebral body is 

apparently a transitional lumbarized S1.



VERTEBRA:

As above.



MARROW:

As above.



PARASPINAL SOFT TISSUES:

As above.



CORD:

As above.



DISCS:

Disc bulges at T10-11, T11-12 and T12-L1 are reiterated and abut the 

ventral cord without significant stenosis although the disc bulges 

T12-L1 contributes to the compression from posterior epidural 

collection.



OTHER FINDINGS:

None.



IMPRESSION:

In general, there is marked improvement in the posterior epidural 

space throughout the thoracic spine as compared to the prior 

examination 04/10/2018 demonstrating irregular, inhomogeneous fluid 

in the posterior epidural space throughout the majority if not 

entirety of the thoracic spine suspicious for abscess or other 

infiltrative process. However, prominent focal collection at the 

T12-L1 posterior epidural space is identified combined with disc 

bulge resulting in significant compression of the inferior thoracic 

cord but no cord reaction or enhancement at this time. This may 

communicate with left paraspinal fluid collection/edema at the 

posterior left paraspinal space at T12-L1 as discussed above. 



Other lesser findings as discussed above.



Concordant preliminary report from [facility], 05/21/2018.

## 2018-05-22 NOTE — MRI
PROCEDURE:  MR CERVICAL SPINE WITHOUT CONTRAST



HISTORY:

BLE weakness



COMPARISON:

None available. 



TECHNIQUE:

Multiecho multiplanar sequences were performed through the cervical 

spine without the use of intravenous contrast.



FINDINGS:

Normal lordotic curvature. 



Craniocervical junction unremarkable.



Vertebral body heights preserved.



No marrow signal abnormality. Diffuse multilevel disc desiccation is 

identified.



Normal cervical cord.



No prevertebral abnormality. Note is made of a heterogeneous signal 

intensity mass 5.0 x 4.0 cm at the left neck/ lateral paraspinal soft 

tissues best seen in axial images at the level of the C6 through T1 

vertebral bodies well defined in prior chest CT 01/14/2018. Motion 

artifacts limit this study somewhat. 



C2-C3:

No disc herniation, spinal canal stenosis or neural foraminal 

narrowing. 



C3-C4:

No disc herniation is identified although limited disc bulging 

encroaches the ventral nerve roots and causes borderline central 

canal stenosis.  No neural foraminal stenosis bilaterally. 



C4-C5:

No disc herniation or severe central canal foraminal stenosis 

although an additional limited disc bulge encroaches ventral nerve 

roots at this level as well resulting in borderline central stenosis. 



C5-C6:

Again, limited disc bulging encroaches the ventral nerve roots with a 

borderline central stenosis. No significant neural foraminal 

stenosis. 



C6-C7:

No disc herniation, spinal canal stenosis or neural foraminal 

narrowing. 



C7-T1:

No  disc herniation, spinal canal stenosis or neural foraminal 

narrowing. 



OTHER FINDINGS:

None. 



IMPRESSION:

Degenerative borderline central stenoses are identified at C3-4, C4-5 

and C5-6.  No disc herniation throughout the exam.  Normal curvature 

evident.



Incidental note is made of a mass at the left neck base/ left lateral 

paraspinal soft tissues once again, better defined in separate 

unenhanced chest CT 01/14/2018. Follow-up neck or chest CT can be 

performed for additional interval characterization of this finding.

## 2018-05-23 LAB
ALBUMIN SERPL-MCNC: 3.3 G/DL (ref 3.5–5)
ALBUMIN SERPL-MCNC: 3.3 G/DL (ref 3.5–5)
ALBUMIN/GLOB SERPL: 0.9 {RATIO} (ref 1–2.1)
ALBUMIN/GLOB SERPL: 1 {RATIO} (ref 1–2.1)
ALT SERPL-CCNC: 68 U/L (ref 9–52)
ALT SERPL-CCNC: 86 U/L (ref 9–52)
APTT BLD: 29.4 SECONDS (ref 25.6–37.1)
ARTERIAL BLOOD GAS HEMOGLOBIN: 10.2 G/DL (ref 11.7–17.4)
ARTERIAL BLOOD GAS O2 SAT: 97.6 % (ref 95–98)
ARTERIAL BLOOD GAS PCO2: 40 MM/HG (ref 35–45)
ARTERIAL BLOOD GAS TCO2: 35.4 MMOL/L (ref 22–28)
ARTERIAL PATENCY WRIST A: YES
AST SERPL-CCNC: 39 U/L (ref 14–36)
AST SERPL-CCNC: 43 U/L (ref 14–36)
BUN SERPL-MCNC: 12 MG/DL (ref 7–17)
BUN SERPL-MCNC: 13 MG/DL (ref 7–17)
BUN SERPL-MCNC: 14 MG/DL (ref 7–17)
CALCIUM SERPL-MCNC: 8.5 MG/DL (ref 8.4–10.2)
CALCIUM SERPL-MCNC: 8.7 MG/DL (ref 8.4–10.2)
CALCIUM SERPL-MCNC: 8.9 MG/DL (ref 8.4–10.2)
GFR NON-AFRICAN AMERICAN: > 60
HCO3 BLDA-SCNC: 33.2 MMOL/L (ref 21–28)
HEPATITIS B CORE AB: NEGATIVE
HEPATITIS C ANTIBODY: NEGATIVE
INHALED O2 CONCENTRATION: 21 %
INR PPP: 1.2 (ref 0.9–1.2)
O2 CAP BLDA-SCNC: 13.8 ML/DL (ref 16–24)
O2 CT BLDA-SCNC: 13.5 ML/DL (ref 15–23)
PH BLDA: 7.54 [PH] (ref 7.35–7.45)
PO2 BLDA: 67 MM/HG (ref 80–100)
PROTHROMBIN TIME: 13.4 SECONDS (ref 9.8–13.1)

## 2018-05-23 PROCEDURE — 3E1R38Z IRRIGATION OF SPINAL CANAL USING IRRIGATING SUBSTANCE, PERCUTANEOUS APPROACH: ICD-10-PCS

## 2018-05-23 PROCEDURE — 009U0ZZ DRAINAGE OF SPINAL CANAL, OPEN APPROACH: ICD-10-PCS

## 2018-05-23 RX ADMIN — HYDROMORPHONE HYDROCHLORIDE PRN MG: 1 INJECTION, SOLUTION INTRAMUSCULAR; INTRAVENOUS; SUBCUTANEOUS at 16:54

## 2018-05-23 RX ADMIN — LEVALBUTEROL SCH MG: 0.63 SOLUTION RESPIRATORY (INHALATION) at 01:23

## 2018-05-23 RX ADMIN — DIGOXIN SCH MG: 0.12 TABLET ORAL at 10:18

## 2018-05-23 RX ADMIN — LEVALBUTEROL SCH: 0.63 SOLUTION RESPIRATORY (INHALATION) at 13:51

## 2018-05-23 RX ADMIN — IPRATROPIUM BROMIDE SCH: 0.5 SOLUTION RESPIRATORY (INHALATION) at 13:50

## 2018-05-23 RX ADMIN — IPRATROPIUM BROMIDE SCH MG: 0.5 SOLUTION RESPIRATORY (INHALATION) at 22:05

## 2018-05-23 RX ADMIN — IPRATROPIUM BROMIDE SCH MG: 0.5 SOLUTION RESPIRATORY (INHALATION) at 01:23

## 2018-05-23 RX ADMIN — HYDROMORPHONE HYDROCHLORIDE PRN MG: 1 INJECTION, SOLUTION INTRAMUSCULAR; INTRAVENOUS; SUBCUTANEOUS at 16:05

## 2018-05-23 RX ADMIN — LEVALBUTEROL SCH MG: 0.63 SOLUTION RESPIRATORY (INHALATION) at 07:52

## 2018-05-23 RX ADMIN — HYDROMORPHONE HYDROCHLORIDE PRN MG: 1 INJECTION, SOLUTION INTRAMUSCULAR; INTRAVENOUS; SUBCUTANEOUS at 16:20

## 2018-05-23 RX ADMIN — HYDROMORPHONE HYDROCHLORIDE PRN MG: 1 INJECTION, SOLUTION INTRAMUSCULAR; INTRAVENOUS; SUBCUTANEOUS at 16:36

## 2018-05-23 RX ADMIN — IPRATROPIUM BROMIDE SCH MG: 0.5 SOLUTION RESPIRATORY (INHALATION) at 07:51

## 2018-05-23 RX ADMIN — LEVALBUTEROL SCH MG: 0.63 SOLUTION RESPIRATORY (INHALATION) at 22:04

## 2018-05-23 NOTE — CON
DATE:  05/22/2018



REASON FOR CONSULTATION:  Inability to move legs.



HISTORY OF PRESENT ILLNESS:  The patient is a 62-year-old young woman with

history of multiple myeloma.  She was recently hospitalized with a Staph

infection and was placed on IV vancomycin for four weeks.  She also has

chemotherapy started.  She was transferred to [place] to continue her

treatments and then discharged on 05/12/2018.  The son reports her having

some weakness at that time, but she was ambulatory with the walker. 

However, yesterday around 1 a.m., she went to get out of bed and fell down

and could not get herself up off the floor.  The family came and brought

her to the emergency room.  It has been noted that she is unable to move

her left leg at all.  She has some motion just down around the right ankle.

An MRI was done of the lumbar spine from the emergency room, which was

reviewed and we asked them to do MRIs of the entire spinal column to

review.  She states she is having some difficulty of voiding and that she

always feels like she still has to void.  According to nursing, she has

voided a lot, but bladder scan after a large void still reveal a residual

500 mL, so she is going to have placement of an indwelling Sharma.



PAST MEDICAL HISTORY:  Significant for atrial fibrillation for which she

takes Xarelto.  She has also hypertension, CHF, and multiple myelomas we

mentioned.



PAST SURGICAL HISTORY:  Significant for placement of a port for her chemo,

which is now out as well as many years ago hysterectomy.



MEDICATIONS:  As listed on the chart.



ALLERGIES:  SHE IS NOT ALLERGIC TO ANY MEDICATION SHE KNOWS OF.



SOCIAL HISTORY:  No history of smoking or alcohol intake with all the

medications she is on.



PHYSICAL EXAMINATION:

EXTREMITIES:  She complains of pain in the lower thoracic as well as the

lumbosacral junction.  She has no obvious active movement or muscle

contraction of her left lower extremity.  She has good inversion, eversion,

and dorsiflexion of her right ankle with good strength.  She has good

strength of her right EHL as well.  She states her sensation is intact

everywhere to light touch.  Reflexes are intact and symmetrical.  No clonus

is present.  She has good distal pulses.  Toes appeared to be downgoing on

the right side, but upgoing on the left.  No active hip flexion for quad

function either leg.



LABORATORY DATA:  MRIs reviewed and they still show some thickening either

from the malignancy or inflammation diffusely through an epidural space. 

However, there appears to be much larger collection at T11-T12 that is much

more significant than on the April MRI studies.



IMPRESSION:  Spinal cord compression with resulted neurologic dysfunction. 

The only significant change looking at comparing her MRIs from April to May

is that of this collection at T11-T12.



RECOMMENDATIONS:  Our recommendation would be to do a decompressive

laminectomy at that level.  Her complicating factor when she first came in

was that she had already taken Xarelto or been given it Monday morning, so

that the recommendation is to wait at least 24, if not 48 hours before

proceeding with surgery.  The Xarelto was stopped after that dose on Monday

morning.  However, her INR came back at 2.6.  There is no known reason for

this coagulation dysfunction.  She is being given units of fresh frozen

plasma now.  Retesting will be done after the completion of the second unit

to see if it is having any effect at all.  If it seems to be lowering it

some, then the recommendation would be to continue to keep giving the unit

to try and get her below 1.5 would be relatively safe to operate.  The fact

that at that point she will already be 48 hours with no motion, presuming

there is no spontaneous recovery, would indicate that the chances of her

regaining any functional ability are very small, but the family would like

something done if there is any chance whatsoever.  Again, it only depend on

what happens with her coagulopathy because if the fresh frozen has no

effect, there is really no way of doing anything else.



She is tentatively scheduled for surgery tomorrow around noontime, again

pending on her lab results.



Thank you for allowing me to participate in the care of your patient.





__________________________________________

Dale Chappell MD





DD:  05/22/2018 17:15:24

DT:  05/22/2018 21:25:31

Job # [id number]

## 2018-05-23 NOTE — CARD
--------------- APPROVED REPORT --------------





EKG Measurement

Heart [id number]

[id number]

[id number]

[id number]



<Conclusion>

Atrial fibrillation with a competing junctional pacemaker

Minimal voltage criteria for LVH, may be normal variant

Nonspecific T wave abnormality

Abnormal ECG

## 2018-05-23 NOTE — CP.PCM.PN
Subjective





- Date & Time of Evaluation


Date of Evaluation: 05/23/18


Time of Evaluation: 10:30





- Subjective


Subjective: 





Pt has received 3 units of ffpand the INR is 1.2 today. She was given lasix 

after the 2nd unit of plasma and her potassium dropped to 3.2 this morning. She 

was given 40 meq kcl po and is now cleared for surgery.





Objective





- Vital Signs/Intake and Output


Vital Signs (last 24 hours): 


 











Temp Pulse Resp BP Pulse Ox


 


 98.0 F   81   18   143/89   96 


 


 05/23/18 07:51  05/23/18 07:51  05/23/18 07:51  05/23/18 07:51  05/23/18 07:51











- Medications


Medications: 


 Current Medications





Albuterol (Ventolin Hfa 90 Mcg/Actuation (8 G))  2 puff IH Q4 PRN


   PRN Reason: Shortness of Breath


Allopurinol (Zyloprim)  100 mg PO DAILY [facility]


   Last Admin: 05/22/18 11:48 Dose:  100 mg


Carvedilol (Coreg)  25 mg PO Q12 [facility]


   Last Admin: 05/22/18 21:40 Dose:  25 mg


Dexamethasone (Decadron)  20 mg PO DAILY [facility]


   Last Admin: 05/22/18 14:37 Dose:  20 mg


Digoxin (Digoxin)  0.125 mg PO DAILY [facility]


   Last Admin: 05/22/18 09:07 Dose:  0.125 mg


Famotidine (Pepcid)  20 mg PO BID [facility]


   Last Admin: 05/22/18 17:26 Dose:  20 mg


Furosemide (Lasix)  20 mg PO DAILY [facility]


   Last Admin: 05/22/18 11:48 Dose:  20 mg


Home Med (Lenalidomide [Revlimid])  15 mg PO DAILY [facility]


   Last Admin: 05/22/18 09:07 Dose:  15 mg


Potassium Chloride (Potassium Chloride 20 Meq/100 Ml)  100 mls @ 50 mls/hr IVPB 

Q2 [facility]


   Stop: 05/23/18 13:59


Cefepime HCl 1 gm/ Sodium (Chloride)  100 mls @ 100 mls/hr IVPB Q12 [facility]


   PRN Reason: Protocol


Ipratropium Bromide (Atrovent)  0.5 mg IH RQ6 [facility]


   Last Admin: 05/23/18 07:51 Dose:  0.5 mg


Levalbuterol HCl (Xopenex)  0.63 mg INH RQ6 [facility]


   Last Admin: 05/23/18 07:52 Dose:  0.63 mg


Lisinopril (Zestril)  10 mg PO DAILY [facility]


   Last Admin: 05/22/18 09:10 Dose:  10 mg


Morphine Sulfate (Morphine)  2 mg IVP Q4 PRN


   PRN Reason: Pain, moderate (4-7)


   Last Admin: 05/22/18 16:47 Dose:  2 mg


Nystatin (Nystop Topical Powder)  1 applic TOP TID [facility]


   Last Admin: 05/22/18 17:26 Dose:  1 applic











- Labs


Labs: 


 





 05/22/18 20:35 





 05/23/18 05:40 





 











PT  13.4 Seconds (9.8-13.1)  H  05/23/18  05:40    


 


INR  1.2  (0.9-1.2)   05/23/18  05:40    


 


APTT  29.4 Seconds (25.6-37.1)   05/23/18  05:40

## 2018-05-23 NOTE — CP.CCUPN
CCU Subjective





- Physician Review


Subjective (Free Text): 


66F with chronic A fib on Xarelto; metastatic Multiple Myeloma, on Chemotx, 

admitted 2 days ago for progressive LLE weakness / flaccidity, and concomitant 

weakness of RLE too; falls were reported. Further w/u with MRIs showed SPC 

compression of T11-12 and an epidural abscess. After adequate time  off Xarelto

, she underwent surgery today for T11-12 Laminectomy. Intra-op fluids given 

include 400ml , and urine output of 200+ml. EBL was very minimal. She was able 

to be extubated post-op. Awake, alert and appropriately responsive upon arrival 

to ICU.





Other vitals and I/O's reviewed. 





ROS:  No other pertinent negs or positives on 10+  system review. 





ALLERGIES:  NKDA





Home Meds:  Ventolin HFA, ASA, Xarelto, Allopurinol, Coreg, Decadron, Digoxin, 

Pepcid, Lasix, Revlimid, Zestril. 





PMSFH:   All other Nursing and physician documentation reviewed to date; no new 

pertinent info noted relevant to current medical problems.











EXAM- 


HEENT: no icterus, no gaze preference, pupils equal and reactive


NECK: No JVD, supple, carotids equal upstroke bilat/no bruits


CHEST: decreased BS bases, no wheezes audible now, no accessory mm use. 


HEART:  regular, distant, S1S2, no rubs.


ABD:  soft, no distention, no tympany, no palp tenderness, BS hypoactive


EXT: No mottling. No edema. No peripheral/ digital cyanosis, no calf tenderness 

or palpable cords, distal pulses intact and symmetrical. 


NEURO:  moves RLE  +2-3/5; LLE motor is 0-1/5 manifest by slight wiggle of 

entire foot


SKIN:   no rashes,  warm and dry.











LABS:  Pre-op





WBC= 6.5


HGB= 11.7   


PLTs=  207K





Zx=488


K= 3.4


NY=419


HCO3= 29


BUN/Cr= 13/0.3


BS= 127





7.54/40/67





INR= 1.2





CXR: mild haziness  R lung, elevated Left hemidiaphragm (my interp).














IMPRESSION / MAJOR PROBLEMS NOW:


1.   Post-op T-Spine Laminectomy


2.   Epidural Abscess with SPC compression


3.   s/p COPD Exacerb


4.    Bilateral pneumonia as seen on CT 


5.    Chronic A Fib with medication-induced coagulopathy 


6.   Metastatic Multiple Myeloma








PLAN:





1.    Presently stable post-op status, for observation overnight in ICU.


2.    Monitor Neurochecks Q1-2H.


3.    Cefepime empiric abx coverage.


4.    Continuation of Decadron.


5.    Resume AC  for chronic AFib when cleared by NeuroSurg. 


6.    Additional Lasix prn.


7.    SCDs





CCU Objective





- Vital Signs / Intake & Output


Vital Signs (Last 4 hours): 


Vital Signs











  Temp Pulse Resp BP Pulse Ox


 


 05/23/18 17:20  97.8 F  81  19  142/88  96


 


 05/23/18 17:05  97.9 F  80  19  146/88  97


 


 05/23/18 16:50   75  18  135/98 H  95


 


 05/23/18 16:35  98.1 F  76  19  135/89  95


 


 05/23/18 16:20   73  19  120/61  97


 


 05/23/18 16:05  98 F  74  19  146/75  98


 


 05/23/18 15:50   78  20  130/67  100


 


 05/23/18 15:35   80  20  125/72  100


 


 05/23/18 15:20   87  18  128/64  100


 


 05/23/18 15:07  98.9 F  90  18  140/57 L  100











Intake and Output (Last 8hrs): 


 Intake & Output











 05/23/18 05/23/18 05/23/18





 06:59 14:59 22:59


 


Intake Total  400 150


 


Output Total   450


 


Balance  400 -300


 


Intake:   


 


  IV  400 150


 


Output:   


 


  Urine   450

## 2018-05-23 NOTE — CP.PCM.PN
Subjective





- Date & Time of Evaluation


Date of Evaluation: 05/23/18


Time of Evaluation: 09:00





- Subjective


Subjective: 





                                                                     Tolerated 

large quantities of IV volume with FFP infusion (Balanced by diuresis with IV 

Lasix)


                                                                     This AM 

denies any dyspnoea (Can converse effortlessly)


                                                                     A Fib at 

74 BPM


                                                                     /70 

mm Hg


                                                                     JVP flat, 

no rales


                                                                     HypoK+ 

this AM (Oral 40mEq tab given with a sip of water)


                                                                     Discussed 

this with anesthesia


                                                                     Pt 

medically stable for her Sx today





Objective





- Vital Signs/Intake and Output


Vital Signs (last 24 hours): 


 











Temp Pulse Resp BP Pulse Ox


 


 98.0 F   81   18   143/89   96 


 


 05/23/18 07:51  05/23/18 07:51  05/23/18 07:51  05/23/18 07:51  05/23/18 07:51











- Medications


Medications: 


 Current Medications





Albuterol (Ventolin Hfa 90 Mcg/Actuation (8 G))  2 puff IH Q4 PRN


   PRN Reason: Shortness of Breath


Allopurinol (Zyloprim)  100 mg PO DAILY [facility]


   Last Admin: 05/22/18 11:48 Dose:  100 mg


Carvedilol (Coreg)  25 mg PO Q12 [facility]


   Last Admin: 05/22/18 21:40 Dose:  25 mg


Dexamethasone (Decadron)  20 mg PO DAILY [facility]


   Last Admin: 05/22/18 14:37 Dose:  20 mg


Digoxin (Digoxin)  0.125 mg PO DAILY [facility]


   Last Admin: 05/22/18 09:07 Dose:  0.125 mg


Famotidine (Pepcid)  20 mg PO BID [facility]


   Last Admin: 05/22/18 17:26 Dose:  20 mg


Furosemide (Lasix)  20 mg PO DAILY [facility]


   Last Admin: 05/22/18 11:48 Dose:  20 mg


Home Med (Lenalidomide [Revlimid])  15 mg PO DAILY [facility]


   Last Admin: 05/22/18 09:07 Dose:  15 mg


Potassium Chloride (Potassium Chloride 20 Meq/100 Ml)  100 mls @ 50 mls/hr IVPB 

Q2 [facility]


   Stop: 05/23/18 13:59


Cefepime HCl 1 gm/ Sodium (Chloride)  100 mls @ 100 mls/hr IVPB Q12 [facility]


   PRN Reason: Protocol


Ipratropium Bromide (Atrovent)  0.5 mg IH RQ6 [facility]


   Last Admin: 05/23/18 07:51 Dose:  0.5 mg


Levalbuterol HCl (Xopenex)  0.63 mg INH RQ6 [facility]


   Last Admin: 05/23/18 07:52 Dose:  0.63 mg


Lisinopril (Zestril)  10 mg PO DAILY [facility]


   Last Admin: 05/22/18 09:10 Dose:  10 mg


Morphine Sulfate (Morphine)  2 mg IVP Q4 PRN


   PRN Reason: Pain, moderate (4-7)


   Last Admin: 05/22/18 16:47 Dose:  2 mg


Nystatin (Nystop Topical Powder)  1 applic TOP TID [facility]


   Last Admin: 05/22/18 17:26 Dose:  1 applic











- Labs


Labs: 


 





 05/22/18 20:35 





 05/23/18 05:40 





 











PT  13.4 Seconds (9.8-13.1)  H  05/23/18  05:40    


 


INR  1.2  (0.9-1.2)   05/23/18  05:40    


 


APTT  29.4 Seconds (25.6-37.1)   05/23/18  05:40

## 2018-05-23 NOTE — CT
EXAM:

  CT Chest With Intravenous Contrast

  CT Abdomen and Pelvis With Intravenous Contrast



EXAM DATE/TIME:

  5/22/2018 6:38 PM



CLINICAL HISTORY:

  62 years old, female; Signs and symptoms; Other: Weakness, epidural fluid 

collection; Prior surgery; Surgery date: 6+ months; Surgery type: Append. 

Cholecyst. Hysterectomy; Patient HX: HX of multiple myloma; Additional info: Md 

order



TECHNIQUE:

  Axial computed tomography images of the chest, abdomen and pelvis with 

intravenous contrast.  All CT scans at this facility use one or more dose 

reduction techniques, viz.: automated exposure control; ma/kV adjustment per 

patient size (including targeted exams where dose is matched to indication; 

i.e. head); or iterative reconstruction technique.  All CT scans at this 

facility use one or more dose reduction techniques, viz.: automated exposure 

control; ma/kV adjustment per patient size (including targeted exams where dose 

is matched to indication; i.e. head); or iterative reconstruction technique.

  Coronal and sagittal reformatted images were created and reviewed.



CONTRAST:

  95 mL of [id number] administered intravenously.



COMPARISON:

  Prior CT chest of 2018-01-14



FINDINGS:

  LIMITATIONS:  Mild to moderate motion artifact.

 CHEST:

  LUNGS:  Findings suspicious for a scattered bilateral acute pneumonia. There 

are focal areas of dense consolidation in the medial lower lobes bilaterally, 

greater on the left. There is also patchy consolidation in the left upper lobe. 

Subtle, patchy ground glass densities also noted in the lungs bilaterally.  

Stable appearance of a 6 mm noncalcified pulmonary nodule in the left lung 

base.  No evidence of diffuse pulmonary vascular congestion.

  PLEURAL SPACE:  No pneumothorax or significant pleural effusions seen.

  HEART:  Heart appears mildly enlarged.

  MEDIASTINUM:  Again seen is a 6.2 x 4.5 cm well-defined probable cystic mass 

in the left superior mediastinum, abutting the trachea and esophagus on the 

left. This does not appear significantly changed compared to the previous exam. 

A vessel is noted to course within this cystic mass inferiorly. The trachea and 

esophagus are displaced rightward by the mass, unchanged. No associated gas or 

adjacent inflammation.



 ABDOMEN:

  LIVER:  4 cm low density liver lesion, most likely a cyst.  Fatty 

infiltration of the liver.

  GALLBLADDER AND BILE DUCTS:   Cholecystectomy clips.  

  PANCREAS:  No CT evidence of acute pancreatitis.

  SPLEEN:  No acute abnormality of the spleen identified.

  ADRENALS:  No acute abnormality of the adrenal glands.

  KIDNEYS AND URETERS:  Incidental fluid density probable cystic right renal 

lesion, measuring less than 10 mm. Consistent with the [employer] College of 

Radiology?s Incidental Findings Committee Report, unless the patient?s specific 

circumstances suggest otherwise, any cystic kidney lesion less than 1.0 cm not 

otherwise characterized in this report as possessing suspicious or 

indeterminate imaging features is/are highly likely to be benign and do not 

require follow-up imaging or biopsy.  No acute abnormality of the kidneys 

identified.

  STOMACH AND BOWEL:  No acute abnormality of the stomach, small bowel or colon 

identified. No evidence of bowel obstruction.



 PELVIS:

  APPENDIX:  Normal appendix is not seen, and there is a reported history of 

previous appendectomy. 

  BLADDER:  Catheter noted within the bladder lumen.

  REPRODUCTIVE:  Uterus is surgically absent.  No evidence of large adnexal 

masses.



 CHEST, ABDOMEN and PELVIS:

  INTRAPERITONEAL SPACE:  No evidence of free intraperitoneal air or fluid.

  BONES/JOINTS:  Extensive lytic bony lesions, compatible with the given 

diagnosis of multiple myeloma. The largest lesion is a large, destructive, 

expansile lytic lesion involving the left first rib, grossly stable in 

appearance. There is also a large lesion in the right sacrum, measuring 5 cm, 

which does break through the cortex. A second lytic lesion of the lower sacrum 

on the left also breaks through the cortex.  Multiple chronic vertebral 

compression fractures again seen, greatest at the T6 level, where there is 

associated marked vertebral flattening. Bilateral healed rib fractures also 

seen.  No acute fractures visualized.

  SOFT TISSUES:  No acute abnormality of the visualized soft tissues is seen.

  VASCULATURE:  Main pulmonary artery segment is enlarged, a finding which can 

be seen with pulmonary hypertension. Recommend clinical correlation. No 

evidence of pulmonary embolism involving the large/central pulmonary arteries. 

The exam is nondiagnostic for small pulmonary emboli, however.  No evidence of 

aortic dissection or periaortic hemorrhage.

  LYMPH NODES:  No evidence of diffuse lymphadenopathy.



IMPRESSION:     

- Findings highly suspicious for scattered, bilateral acute pneumonia, greatest 

in the left lower lobe medially.

- Otherwise, no evidence of significant acute process.

- Extensive lytic bony lesions, compatible with the given diagnosis of multiple 

myeloma. The largest lesion is a destructive, expansile lesion involving the 

left first rib, grossly stable compared to a 1/14/2018 CT. There are also 

bilateral sacral lytic lesions.

- Stable appearance of a well-defined 6.2 cm cystic mass in the left upper 

mediastinum, of uncertain etiology.

- See above for remaining findings.

## 2018-05-24 LAB
ALBUMIN SERPL-MCNC: 3.2 G/DL (ref 3.5–5)
ALBUMIN/GLOB SERPL: 0.9 {RATIO} (ref 1–2.1)
ALT SERPL-CCNC: 76 U/L (ref 9–52)
APTT BLD: 26.4 SECONDS (ref 25.6–37.1)
AST SERPL-CCNC: 34 U/L (ref 14–36)
BUN SERPL-MCNC: 17 MG/DL (ref 7–17)
CALCIUM SERPL-MCNC: 8.5 MG/DL (ref 8.4–10.2)
ERYTHROCYTE [DISTWIDTH] IN BLOOD BY AUTOMATED COUNT: 18.4 % (ref 11.5–14.5)
GFR NON-AFRICAN AMERICAN: > 60
HGB BLD-MCNC: 10.6 G/DL (ref 12–16)
INR PPP: 1.2 (ref 0.9–1.2)
MCH RBC QN AUTO: 29.9 PG (ref 27–31)
MCHC RBC AUTO-ENTMCNC: 33 G/DL (ref 33–37)
MCV RBC AUTO: 90.6 FL (ref 81–99)
PLATELET # BLD: 238 K/UL (ref 130–400)
PROTHROMBIN TIME: 13.2 SECONDS (ref 9.8–13.1)
RBC # BLD AUTO: 3.55 MIL/UL (ref 3.8–5.2)
WBC # BLD AUTO: 8.6 K/UL (ref 4.8–10.8)

## 2018-05-24 RX ADMIN — IPRATROPIUM BROMIDE SCH MG: 0.5 SOLUTION RESPIRATORY (INHALATION) at 19:31

## 2018-05-24 RX ADMIN — IPRATROPIUM BROMIDE SCH MG: 0.5 SOLUTION RESPIRATORY (INHALATION) at 02:29

## 2018-05-24 RX ADMIN — IPRATROPIUM BROMIDE SCH MG: 0.5 SOLUTION RESPIRATORY (INHALATION) at 13:41

## 2018-05-24 RX ADMIN — LEVALBUTEROL SCH MG: 0.63 SOLUTION RESPIRATORY (INHALATION) at 02:29

## 2018-05-24 RX ADMIN — LEVALBUTEROL SCH MG: 0.63 SOLUTION RESPIRATORY (INHALATION) at 13:41

## 2018-05-24 RX ADMIN — LEVALBUTEROL SCH MG: 0.63 SOLUTION RESPIRATORY (INHALATION) at 19:32

## 2018-05-24 RX ADMIN — LEVALBUTEROL SCH MG: 0.63 SOLUTION RESPIRATORY (INHALATION) at 07:34

## 2018-05-24 RX ADMIN — IPRATROPIUM BROMIDE SCH MG: 0.5 SOLUTION RESPIRATORY (INHALATION) at 07:34

## 2018-05-24 RX ADMIN — DIGOXIN SCH: 0.12 TABLET ORAL at 09:11

## 2018-05-24 NOTE — CP.PCM.PN
Subjective





- Date & Time of Evaluation


Date of Evaluation: 05/24/18


Time of Evaluation: 10:45





- Subjective


Subjective: 





                                                                Drowsy, easily 

arousable


                                                                Sleepy after 

Morphine given for surgical pain


                                                                A Fib at 78 BPM


                                                                /70 mm Hg


                                                                Chest clear, no 

rales


                                                                Labs show mild 

drop in Hb/HCT post op


                                                                INR 1.2


                                                                K+ and Bun/

Creatinin stable


                                                                Pt taking oral 

fluid readily (Have D/Blayne IV fluids)





Objective





- Vital Signs/Intake and Output


Vital Signs (last 24 hours): 


 











Temp Pulse Resp BP Pulse Ox


 


 98.2 F   83   19   129/89   97 


 


 05/24/18 08:00  05/24/18 09:37  05/24/18 08:00  05/24/18 09:37  05/24/18 08:00








Intake and Output: 


 











 05/24/18 05/24/18





 06:59 18:59


 


Intake Total 840 200


 


Output Total 450 


 


Balance 390 200














- Medications


Medications: 


 Current Medications





Albuterol (Ventolin Hfa 90 Mcg/Actuation (8 G))  2 puff IH Q4 PRN


   PRN Reason: Shortness of Breath


   Last Admin: 05/24/18 06:27 Dose:  2 puff


Allopurinol (Zyloprim)  [facility]ILY [facility]


   Last Admin: 05/24/18 09:12 Dose:  Not Given


Carvedilol (Coreg)  25 mg PO Q12 [facility]


   Last Admin: 05/24/18 09:37 Dose:  25 mg


Dexamethasone (Decadron)  20 mg PO DAILY [facility]


   Last Admin: 05/24/18 09:11 Dose:  Not Given


Digoxin (Digoxin)  0.125 mg PO DAILY [facility]


   Last Admin: 05/24/18 09:11 Dose:  Not Given


Famotidine (Pepcid)  20 mg PO BID [facility]


   Last Admin: 05/24/18 09:11 Dose:  Not Given


Furosemide (Lasix)  20 mg PO DAILY [facility]


   Last Admin: 05/24/18 09:11 Dose:  Not Given


Cefepime HCl 1 gm/ Sodium (Chloride)  100 mls @ 100 mls/hr IVPB Q12 [facility]


   PRN Reason: Protocol


   Last Admin: 05/23/18 21:49 Dose:  100 mls/hr


Ipratropium Bromide (Atrovent)  0.5 mg IH RQ6 [facility]


   Last Admin: 05/24/18 07:34 Dose:  0.5 mg


Levalbuterol HCl (Xopenex)  0.63 mg INH RQ6 [facility]


   Last Admin: 05/24/18 07:34 Dose:  0.63 mg


Lisinopril (Zestril)  10 mg PO DAILY [facility]


   Last Admin: 05/24/18 09:12 Dose:  Not Given


Morphine Sulfate (Morphine)  2 mg IVP Q4 PRN


   PRN Reason: Pain, moderate (4-7)


   Last Admin: 05/24/18 09:42 Dose:  2 mg


Morphine Sulfate (Morphine)  1 mg IVP Q4 PRN


   PRN Reason: Pain, severe (8-10)


Nystatin (Nystop Topical Powder)  1 applic TOP TID [facility]


   Last Admin: 05/24/18 09:38 Dose:  1 applic


Oxycodone/Acetaminophen (Percocet 5/325 Mg Tab)  1 tab PO Q4 PRN


   PRN Reason: Pain, Mild (1-3)


   Stop: 05/26/18 14:33











- Labs


Labs: 


 





 05/24/18 04:30 





 05/24/18 04:30 





 











PT  13.2 Seconds (9.8-13.1)  H  05/24/18  04:30    


 


INR  1.2  (0.9-1.2)   05/24/18  04:30    


 


APTT  26.4 Seconds (25.6-37.1)   05/24/18  04:30

## 2018-05-24 NOTE — CP.PCM.PN
Subjective





- Date & Time of Evaluation


Date of Evaluation: 05/24/18


Time of Evaluation: 10:15





- Subjective


Subjective: 





F/U  S/P  T-Spine Laminectomy


minimal post  surgical pain , no SOB , no cough , no chest  congestion





Objective





- Vital Signs/Intake and Output


Vital Signs (last 24 hours): 


 











Temp Pulse Resp BP Pulse Ox


 


 97.7 F   84   20   124/86   92 L


 


 05/24/18 12:00  05/24/18 12:00  05/24/18 12:00  05/24/18 12:00  05/24/18 12:00








Intake and Output: 


 











 05/24/18 05/24/18





 06:59 18:59


 


Intake Total 840 200


 


Output Total 450 


 


Balance 390 200














- Medications


Medications: 


 Current Medications





Albuterol (Ventolin Hfa 90 Mcg/Actuation (8 G))  2 puff IH Q4 PRN


   PRN Reason: Shortness of Breath


   Last Admin: 05/24/18 06:27 Dose:  2 puff


Allopurinol (Zyloprim)  100 mg PO DAILY [facility]


   Last Admin: 05/24/18 09:12 Dose:  Not Given


Bacitracin (Bacitracin Oint)  1 applic TOP DAILY [facility]


Carvedilol (Coreg)  25 mg PO Q12 [facility]


   Last Admin: 05/24/18 09:37 Dose:  25 mg


Dexamethasone (Decadron)  20 mg PO DAILY [facility]


   Last Admin: 05/24/18 09:11 Dose:  Not Given


Digoxin (Digoxin)  0.125 mg PO DAILY [facility]


   Last Admin: 05/24/18 09:11 Dose:  Not Given


Famotidine (Pepcid)  20 mg PO BID [facility]


   Last Admin: 05/24/18 09:11 Dose:  Not Given


Furosemide (Lasix)  20 mg PO DAILY [facility]


   Last Admin: 05/24/18 09:11 Dose:  Not Given


Cefepime HCl 1 gm/ Sodium (Chloride)  100 mls @ 100 mls/hr IVPB Q12 [facility]


   PRN Reason: Protocol


   Last Admin: 05/24/18 11:00 Dose:  100 mls/hr


Ipratropium Bromide (Atrovent)  0.5 mg IH RQ6 [facility]


   Last Admin: 05/24/18 13:41 Dose:  0.5 mg


Levalbuterol HCl (Xopenex)  0.63 mg INH RQ6 [facility]


   Last Admin: 05/24/18 13:41 Dose:  0.63 mg


Lisinopril (Zestril)  10 mg PO DAILY [facility]


   Last Admin: 05/24/18 09:12 Dose:  Not Given


Morphine Sulfate (Morphine)  2 mg IVP Q4 PRN


   PRN Reason: Pain, moderate (4-7)


   Last Admin: 05/24/18 09:42 Dose:  2 mg


Morphine Sulfate (Morphine)  1 mg IVP Q4 PRN


   PRN Reason: Pain, severe (8-10)


Nystatin (Nystop Topical Powder)  1 applic TOP TID [facility]


   Last Admin: 05/24/18 13:12 Dose:  1 applic


Oxycodone/Acetaminophen (Percocet 5/325 Mg Tab)  1 tab PO Q4 [facility]


   PRN Reason: Pain, Mild (1-3)


   Stop: 05/26/18 14:33











- Labs


Labs: 


 





 05/24/18 04:30 





 05/24/18 04:30 





 











PT  13.2 Seconds (9.8-13.1)  H  05/24/18  04:30    


 


INR  1.2  (0.9-1.2)   05/24/18  04:30    


 


APTT  26.4 Seconds (25.6-37.1)   05/24/18  04:30    














- Constitutional


Appears: No Acute Distress





- Head Exam


Head Exam: NORMAL INSPECTION





- Eye Exam


Eye Exam: PERRL





- ENT Exam


ENT Exam: Normal Exam





- Neck Exam


Neck Exam: Normal Inspection





- Respiratory Exam


Respiratory Exam: Rhonchi (few scattered)





- Cardiovascular Exam


Cardiovascular Exam: Irregular Rhythm





- GI/Abdominal Exam


GI & Abdominal Exam: Soft, Normal Bowel Sounds





- Extremities Exam


Extremities Exam: Tenderness (L knee, abrasion L knee)





- Back Exam


Back Exam: tenderness (mild , dressing  in place)





- Neurological Exam


Neurological Exam: Alert, CN II-XII Intact, Oriented x3


Additional comments: 


Able to move LLE with limitations , RLE full flexion  and  etension





- Psychiatric Exam


Psychiatric exam: Normal Affect





- Skin


Skin Exam: Warm





Assessment and Plan


(1) Status post laminectomy


Status: Acute   





(2) Spinal cord compression


Status: Acute   





(3) Multiple myeloma


Status: Chronic   





(4) Afib


Status: Chronic   





(5) HTN (hypertension)


Status: Chronic   





(6) Hypokalemia


Status: Acute   





(7) Urinary tract infection


Status: Acute   





(8) Coagulopathy


Status: Acute   





(9) Pneumonia


Status: Acute   





- Assessment and Plan (Free Text)


Plan: 





stable  post-op , walked  with PT  and  walker , off Xarelto ,  to have  [facility]  

Port  for  Chemo , continue  Cefepime for  Tx UTI and PNA

## 2018-05-24 NOTE — CP.PCM.PN
Subjective





- Date & Time of Evaluation


Date of Evaluation: 05/24/18


Time of Evaluation: 13:25





- Subjective


Subjective: 





SPINE - POD #1





Pt seen in ICU. Resting in bed, but now has active movement of both LE's! 

Taking po. Voiding via block. 





VSS. Afebrile.Has at least 4/5 strength in all groups R LE. 4/5 L ant tib, EHL 

but nothing proximal.  sensation intact. Dressing clean and dry.





Plan: PT for transfers, strengthening. Consider trial of d/c block in am. OK 

from our viewpoint for transfer to med-surg floor.





Objective





- Vital Signs/Intake and Output


Vital Signs (last 24 hours): 


 











Temp Pulse Resp BP Pulse Ox


 


 97.7 F   84   20   124/86   92 L


 


 05/24/18 12:00  05/24/18 12:00  05/24/18 12:00  05/24/18 12:00  05/24/18 12:00








Intake and Output: 


 











 05/24/18 05/24/18





 06:59 18:59


 


Intake Total 840 200


 


Output Total 450 


 


Balance 390 200














- Medications


Medications: 


 Current Medications





Albuterol (Ventolin Hfa 90 Mcg/Actuation (8 G))  2 puff IH Q4 PRN


   PRN Reason: Shortness of Breath


   Last Admin: 05/24/18 06:27 Dose:  2 puff


Allopurinol (Zyloprim)  100 mg PO DAILY [facility]


   Last Admin: 05/24/18 09:12 Dose:  Not Given


Carvedilol (Coreg)  25 mg PO Q12 [facility]


   Last Admin: 05/24/18 09:37 Dose:  25 mg


Dexamethasone (Decadron)  20 mg PO DAILY [facility]


   Last Admin: 05/24/18 09:11 Dose:  Not Given


Digoxin (Digoxin)  0.125 mg PO DAILY [facility]


   Last Admin: 05/24/18 09:11 Dose:  Not Given


Famotidine (Pepcid)  20 mg PO BID [facility]


   Last Admin: 05/24/18 09:11 Dose:  Not Given


Furosemide (Lasix)  20 mg PO DAILY [facility]


   Last Admin: 05/24/18 09:11 Dose:  Not Given


Cefepime HCl 1 gm/ Sodium (Chloride)  100 mls @ 100 mls/hr IVPB Q12 [facility]


   PRN Reason: Protocol


   Last Admin: 05/24/18 11:00 Dose:  100 mls/hr


Ipratropium Bromide (Atrovent)  0.5 mg IH RQ6 [facility]


   Last Admin: 05/24/18 07:34 Dose:  0.5 mg


Levalbuterol HCl (Xopenex)  0.63 mg INH RQ6 [facility]


   Last Admin: 05/24/18 07:34 Dose:  0.63 mg


Lisinopril (Zestril)  10 mg PO DAILY [facility]


   Last Admin: 05/24/18 09:12 Dose:  Not Given


Morphine Sulfate (Morphine)  2 mg IVP Q4 PRN


   PRN Reason: Pain, moderate (4-7)


   Last Admin: 05/24/18 09:42 Dose:  2 mg


Morphine Sulfate (Morphine)  1 mg IVP Q4 PRN


   PRN Reason: Pain, severe (8-10)


Nystatin (Nystop Topical Powder)  1 applic TOP TID [facility]


   Last Admin: 05/24/18 13:12 Dose:  1 applic


Oxycodone/Acetaminophen (Percocet 5/325 Mg Tab)  1 tab PO Q4 PRN


   PRN Reason: Pain, Mild (1-3)


   Stop: 05/26/18 14:33











- Labs


Labs: 


 





 05/24/18 04:30 





 05/24/18 04:30 





 











PT  13.2 Seconds (9.8-13.1)  H  05/24/18  04:30    


 


INR  1.2  (0.9-1.2)   05/24/18  04:30    


 


APTT  26.4 Seconds (25.6-37.1)   05/24/18  04:30

## 2018-05-24 NOTE — CP.CCUPN
CCU Subjective





- Physician Review


Subjective (Free Text): 








Uneventful overnight, minor pain issues relieved with narcotics. Denies any new 

weakness. 





Other vitals and I/O's reviewed. 





ROS:  No other pertinent negs or positives on 10+  system review. 








PMSFH:   All other Nursing and physician documentation reviewed to date; no new 

pertinent info noted relevant to current medical problems.











EXAM- 


HEENT: no icterus, no gaze preference, pupils equal and reactive


NECK: No JVD, supple, carotids equal upstroke bilat/no bruits


CHEST: decreased BS bases, no wheezes heard.


HEART:  regular, distant, S1S2, no rubs.


ABD:  soft, no distention, no tympany, no palp tenderness, BS hypoactive


EXT: No mottling. No edema. No peripheral/ digital cyanosis, no calf tenderness 

or palpable cords, distal pulses intact and symmetrical. 


NEURO:  moves RLE  +2-3/5


       LLE motor 0-1/5 maintains ability to wiggle entire R foot


SKIN:   no rashes,  warm and dry.











LABS:  





WBC=8.6


HGB= 10.6


PLTs=  238K





Jk=992


K= 3.8


PF=668


HCO3= 29


BUN/Cr= 17/0.3


BS= 125





INR= 1.2








IMPRESSION / MAJOR PROBLEMS NOW:


1.   Post-op T-Spine Laminectomy


2.   Epidural Abscess with SPC compression


3.   s/p COPD Exacerb


4.    Bilateral pneumonia as seen on CT 


5.    Chronic A Fib with medication-induced coagulopathy 


6.    Metastatic Multiple Myeloma








PLAN:





1.    Presently stable post-op status, has been under observation overnight in 

ICU.  Could discontinue Arterial Line. Could remove Sharma if cleared by 

NeuroSurg. Incentive Spirometry. 


2.    Monitor Neurochecks Q2H.


3.    Cefepime empiric abx coverage.


4.    Continuation of Decadron.


5.    Resume AC  for chronic AFib when cleared by NeuroSurg. 


6.    Additional Lasix prn.


7.    SCDs











CCU Objective





- Vital Signs / Intake & Output


Vital Signs (Last 4 hours): 


Vital Signs











  Pulse Resp BP Pulse Ox


 


 05/24/18 06:00  80  18  134/86  96











Intake and Output (Last 8hrs): 


 Intake & Output











 05/23/18 05/24/18 05/24/18





 22:59 06:59 14:59


 


Intake Total 590 520 


 


Output Total 450 450 


 


Balance 140 70 


 


Weight  202 lb 8 oz 


 


Intake:   


 


   400 


 


  Intake, Piggyback 100  


 


  Oral 240 120 


 


Output:   


 


  Urine 450 450 


 


    Urethral (Sharma)  450

## 2018-05-25 LAB
ALBUMIN SERPL-MCNC: 3.1 G/DL (ref 3.5–5)
ALBUMIN/GLOB SERPL: 1 {RATIO} (ref 1–2.1)
ALT SERPL-CCNC: 86 U/L (ref 9–52)
AST SERPL-CCNC: 42 U/L (ref 14–36)
BUN SERPL-MCNC: 16 MG/DL (ref 7–17)
CALCIUM SERPL-MCNC: 8.6 MG/DL (ref 8.4–10.2)
GFR NON-AFRICAN AMERICAN: > 60

## 2018-05-25 RX ADMIN — LEVALBUTEROL SCH MG: 0.63 SOLUTION RESPIRATORY (INHALATION) at 19:25

## 2018-05-25 RX ADMIN — IPRATROPIUM BROMIDE SCH MG: 0.5 SOLUTION RESPIRATORY (INHALATION) at 01:22

## 2018-05-25 RX ADMIN — LEVALBUTEROL SCH MG: 0.63 SOLUTION RESPIRATORY (INHALATION) at 01:22

## 2018-05-25 RX ADMIN — BACITRACIN SCH APPLIC: 500 OINTMENT TOPICAL at 08:41

## 2018-05-25 RX ADMIN — IPRATROPIUM BROMIDE SCH MG: 0.5 SOLUTION RESPIRATORY (INHALATION) at 07:08

## 2018-05-25 RX ADMIN — IPRATROPIUM BROMIDE SCH MG: 0.5 SOLUTION RESPIRATORY (INHALATION) at 13:24

## 2018-05-25 RX ADMIN — LEVALBUTEROL SCH MG: 0.63 SOLUTION RESPIRATORY (INHALATION) at 07:08

## 2018-05-25 RX ADMIN — DIGOXIN SCH MG: 0.12 TABLET ORAL at 08:42

## 2018-05-25 RX ADMIN — LEVALBUTEROL SCH MG: 0.63 SOLUTION RESPIRATORY (INHALATION) at 13:24

## 2018-05-25 RX ADMIN — OXYCODONE HYDROCHLORIDE AND ACETAMINOPHEN PRN TAB: 5; 325 TABLET ORAL at 14:54

## 2018-05-25 RX ADMIN — IPRATROPIUM BROMIDE SCH MG: 0.5 SOLUTION RESPIRATORY (INHALATION) at 19:25

## 2018-05-25 NOTE — CP.PCM.PN
Subjective





- Date & Time of Evaluation


Date of Evaluation: 05/25/18


Time of Evaluation: 08:30





- Subjective


Subjective: 





Pt is alert and awake, c/o only slight pain at the surgical site. She is able 

to move both her lower extremities up to the knee level. She has left middle 

lobe pneumonia and is antibiotics for the same. She is back on the xarelto, but 

it will be held 3 days prior to the lifeport placement on tuesday. She will 

receive chemotherapy on tuesday after the life port is placed.





Objective





- Vital Signs/Intake and Output


Vital Signs (last 24 hours): 


 











Temp Pulse Resp BP Pulse Ox


 


 97.9 F   91 H  18   126/86   96 


 


 05/25/18 03:44  05/25/18 03:44  05/25/18 03:44  05/25/18 03:44  05/25/18 03:44











- Medications


Medications: 


 Current Medications





Albuterol (Ventolin Hfa 90 Mcg/Actuation (8 G))  2 puff IH Q4 PRN


   PRN Reason: Shortness of Breath


   Last Admin: 05/24/18 06:27 Dose:  2 puff


Allopurinol (Zyloprim)  100 mg PO DAILY [facility]


   Last Admin: 05/24/18 09:12 Dose:  Not Given


Bacitracin (Bacitracin Oint)  1 applic TOP DAILY [facility]


Carvedilol (Coreg)  25 mg PO Q12 [facility]


   Last Admin: 05/24/18 21:59 Dose:  25 mg


Dexamethasone (Decadron)  20 mg PO DAILY [facility]


   Last Admin: 05/24/18 09:11 Dose:  Not Given


Digoxin (Digoxin)  0.125 mg PO DAILY [facility]


   Last Admin: 05/24/18 09:11 Dose:  Not Given


Famotidine (Pepcid)  20 mg PO BID [facility]


   Last Admin: 05/24/18 16:30 Dose:  20 mg


Furosemide (Lasix)  20 mg PO DAILY [facility]


   Last Admin: 05/24/18 09:11 Dose:  Not Given


Cefepime HCl 1 gm/ Sodium (Chloride)  100 mls @ 100 mls/hr IVPB Q12 [facility]


   PRN Reason: Protocol


   Last Admin: 05/24/18 21:44 Dose:  100 mls/hr


Ipratropium Bromide (Atrovent)  0.5 mg IH RQ6 [facility]


   Last Admin: 05/25/18 07:08 Dose:  0.5 mg


Levalbuterol HCl (Xopenex)  0.63 mg INH RQ6 [facility]


   Last Admin: 05/25/18 07:08 Dose:  0.63 mg


Lisinopril (Zestril)  10 mg PO DAILY [facility]


   Last Admin: 05/24/18 09:12 Dose:  Not Given


Morphine Sulfate (Morphine)  1 mg IVP Q4 PRN


   PRN Reason: Pain, severe (8-10)


   Last Admin: 05/24/18 21:51 Dose:  1 mg


Nystatin (Nystop Topical Powder)  1 applic TOP TID [facility]


   Last Admin: 05/24/18 16:30 Dose:  1 applic


Oxycodone/Acetaminophen (Percocet 5/325 Mg Tab)  1 tab PO Q4 PRN


   PRN Reason: Pain, Mild (1-3)


   Stop: 05/26/18 14:33











- Labs


Labs: 


 





 05/24/18 04:30 





 05/25/18 05:35 





 











PT  13.2 Seconds (9.8-13.1)  H  05/24/18  04:30    


 


INR  1.2  (0.9-1.2)   05/24/18  04:30    


 


APTT  26.4 Seconds (25.6-37.1)   05/24/18  04:30

## 2018-05-25 NOTE — OP
PROCEDURE DATE:  05/23/2018



PREOPERATIVE DIAGNOSIS:  Spinal cord compression due to epidural mass

T11-T12.



POSTOPERATIVE DIAGNOSIS:  Spinal cord compression secondary to epidural

abscess.



OPERATION:  Decompressive laminectomy T11-T12.



SURGEON:  Dale Chappell MD



ASSISTANT:  Nadeem Javier MD



DESCRIPTION OF PROCEDURE:  The patient was brought to the operating room

and general anesthesia was achieved.  The patient already been receiving

antibiotics during her hospital stay.  Sequential compression boots are

placed to each of the patient's legs. Neurophysiologic monitoring leads

were placed throughout the patient's body.  Real-time monitoring was done

by technician in the room and remote monitoring done by physician as well. 

The patient was gently transferred onto the operating room table, placed

prone on Juan frame and keeping her breast and abdomen free from pressure

anteriorly.  Care was taken to protect the elbows and knees from pressure

points.  A Steri-Drape was used to seal off the patient's perineal region

from the operative field and her back was sterilely prepped and draped. 

The level of the incision was noted under fluoroscopy.  Incision was made

sharply in the midline and taken down subcutaneous tissue using sharp and

blunt dissection.  Hemostasis was achieved using electrocautery.  The

fascia was divided and stripped laterally off the spinous processes and

lamina and held back with angled-Weitlaner retractors and subsequently

Gelpi retractors.  Soft tissue attachments were cleared, so we could

identify the pars on each side and at each level.  Hemostasis was achieved

with electrocautery as well as thrombinated Gelfoam powder.  Fluoroscopic

views again were taken to demonstrate we were at the appropriate level.  It

should be noted that initially it was read as being as T11-T12. 

Subsequently, the radiologist feeling was that there was a lumbarized S1

and that really was T12-L1.  Either way what we did was count up from the

first open disk space on the MRI to where the epidural mass was and then

did the same under fluoroscopy to identify the appropriate level.  The

Leksell rongeur was used to remove the spinous process, thinned down the

lamina and laminectomy carried out with a Kerrison rongeurs.  We did this

in a caudocephalad fashion as we advanced it in the cephalad direction, the

production of pus was noted.  Therefore, we carried this up further and

took culture swabs and aspirated some of the pus to send to microbiology.  We

decompressed centrally and then out to each side.  Some pus appeared to be

coming up through the facet joint on the left side as well.  We copiously

irrigated the entire area until no further purulent material was noted

either in the canal nor coming from the joint.  We passed a small red

rubber catheter caudally and cephalad and passed easily.  Again, further

antibiotic irrigation was done.  Hemostasis was achieved using thrombinated

Gelfoam powder.  A piece of thrombins of Gelfoam was used to cover the

exposed neural elements.  The wound was closed with interrupted sutures of

0 Vicryl for the muscle and the fascia.  The subcutaneous tissue was

copiously irrigated with antibiotic solution and vancomycin powder placed

in the wound.  We then closed the subcutaneous tissue with interrupted

sutures of 2-0 Vicryl and the skin with staples.  Bacitracin ointment and

sterile compression dressings were applied.  The patient was gently

transferred back onto her bed in supine position.  She was not extubated

right away as per anesthesia, was taken to the recovery room, and placed on

a vent.  Her estimated blood loss was 10 mL and she received 400 mL of

normal saline during the procedure.  It should be noted that baseline

electrophysiologic monitoring showed no motor tracings in either leg, but

after the decompression, the technician began to get motor tracings in the

right lower extremity.  It should also be noted that when she was  admitted on

Monday, the surgery had to be delayed as she was on Xarelto and had an INR

when she first came in of 2.6.  Once that was corrected with fresh frozen

plasma and having her off the Xarelto to 1.2, she was then taken to the OR

on Wednesday appropriately.





__________________________________________

Dale Chappell MD





DD:  05/24/2018 11:43:11

DT:  05/24/2018 12:43:05

Job # [id number]

QUIANA

## 2018-05-25 NOTE — CP.PCM.PN
Subjective





- Date & Time of Evaluation


Date of Evaluation: 05/25/18


Time of Evaluation: 08:20





- Subjective


Subjective: 





                                                            Comfortable, 

appears to be fairly free of surgical pain


                                                            Afebrile


                                                            HR 70 BPM, irreg


                                                            /70 mm Hg


                                                            No evidence of 

volume over load


                                                            Motor function in 

Lt foot much better


                                                            Will start PT today








Objective





- Vital Signs/Intake and Output


Vital Signs (last 24 hours): 


 











Temp Pulse Resp BP Pulse Ox


 


 97.9 F   91 H  18   126/86   96 


 


 05/25/18 03:44  05/25/18 03:44  05/25/18 03:44  05/25/18 03:44  05/25/18 03:44











- Medications


Medications: 


 Current Medications





Albuterol (Ventolin Hfa 90 Mcg/Actuation (8 G))  2 puff IH Q4 PRN


   PRN Reason: Shortness of Breath


   Last Admin: 05/24/18 06:27 Dose:  2 puff


Allopurinol (Zyloprim)  100 mg PO DAILY [facility]


   Last Admin: 05/24/18 09:12 Dose:  Not Given


Bacitracin (Bacitracin Oint)  1 applic TOP DAILY [facility]


Carvedilol (Coreg)  25 mg PO Q12 [facility]


   Last Admin: 05/24/18 21:59 Dose:  25 mg


Dexamethasone (Decadron)  20 mg PO DAILY [facility]


   Last Admin: 05/24/18 09:11 Dose:  Not Given


Digoxin (Digoxin)  0.125 mg PO DAILY [facility]


   Last Admin: 05/24/18 09:11 Dose:  Not Given


Famotidine (Pepcid)  20 mg PO BID [facility]


   Last Admin: 05/24/18 16:30 Dose:  20 mg


Furosemide (Lasix)  20 mg PO DAILY [facility]


   Last Admin: 05/24/18 09:11 Dose:  Not Given


Cefepime HCl 1 gm/ Sodium (Chloride)  100 mls @ 100 mls/hr IVPB Q12 [facility]


   PRN Reason: Protocol


   Last Admin: 05/24/18 21:44 Dose:  100 mls/hr


Ipratropium Bromide (Atrovent)  0.5 mg IH RQ6 [facility]


   Last Admin: 05/25/18 07:08 Dose:  0.5 mg


Levalbuterol HCl (Xopenex)  0.63 mg INH RQ6 [facility]


   Last Admin: 05/25/18 07:08 Dose:  0.63 mg


Lisinopril (Zestril)  10 mg PO DAILY [facility]


   Last Admin: 05/24/18 09:12 Dose:  Not Given


Morphine Sulfate (Morphine)  1 mg IVP Q4 PRN


   PRN Reason: Pain, severe (8-10)


   Last Admin: 05/25/18 08:38 Dose:  1 mg


Nystatin (Nystop Topical Powder)  1 applic TOP TID [facility]


   Last Admin: 05/24/18 16:30 Dose:  1 applic


Oxycodone/Acetaminophen (Percocet 5/325 Mg Tab)  1 tab PO Q4 PRN


   PRN Reason: Pain, Mild (1-3)


   Stop: 05/26/18 14:33











- Labs


Labs: 


 





 05/24/18 04:30 





 05/25/18 05:35 





 











PT  13.2 Seconds (9.8-13.1)  H  05/24/18  04:30    


 


INR  1.2  (0.9-1.2)   05/24/18  04:30    


 


APTT  26.4 Seconds (25.6-37.1)   05/24/18  04:30

## 2018-05-26 LAB
ALBUMIN SERPL-MCNC: 3 G/DL (ref 3.5–5)
ALBUMIN/GLOB SERPL: 1 {RATIO} (ref 1–2.1)
ALT SERPL-CCNC: 108 U/L (ref 9–52)
AST SERPL-CCNC: 41 U/L (ref 14–36)
BUN SERPL-MCNC: 15 MG/DL (ref 7–17)
CALCIUM SERPL-MCNC: 8.5 MG/DL (ref 8.4–10.2)
ERYTHROCYTE [DISTWIDTH] IN BLOOD BY AUTOMATED COUNT: 18.4 % (ref 11.5–14.5)
GFR NON-AFRICAN AMERICAN: > 60
HGB BLD-MCNC: 11.3 G/DL (ref 12–16)
MCH RBC QN AUTO: 30.5 PG (ref 27–31)
MCHC RBC AUTO-ENTMCNC: 33.8 G/DL (ref 33–37)
MCV RBC AUTO: 90.1 FL (ref 81–99)
PLATELET # BLD: 248 K/UL (ref 130–400)
RBC # BLD AUTO: 3.72 MIL/UL (ref 3.8–5.2)
WBC # BLD AUTO: 7.9 K/UL (ref 4.8–10.8)

## 2018-05-26 RX ADMIN — IPRATROPIUM BROMIDE SCH MG: 0.5 SOLUTION RESPIRATORY (INHALATION) at 07:15

## 2018-05-26 RX ADMIN — LEVALBUTEROL SCH MG: 0.63 SOLUTION RESPIRATORY (INHALATION) at 13:11

## 2018-05-26 RX ADMIN — IPRATROPIUM BROMIDE SCH MG: 0.5 SOLUTION RESPIRATORY (INHALATION) at 13:11

## 2018-05-26 RX ADMIN — BACITRACIN SCH APPLIC: 500 OINTMENT TOPICAL at 09:26

## 2018-05-26 RX ADMIN — LEVALBUTEROL SCH MG: 0.63 SOLUTION RESPIRATORY (INHALATION) at 19:56

## 2018-05-26 RX ADMIN — LEVALBUTEROL SCH MG: 0.63 SOLUTION RESPIRATORY (INHALATION) at 01:05

## 2018-05-26 RX ADMIN — IPRATROPIUM BROMIDE SCH MG: 0.5 SOLUTION RESPIRATORY (INHALATION) at 19:56

## 2018-05-26 RX ADMIN — DIGOXIN SCH MG: 0.12 TABLET ORAL at 09:22

## 2018-05-26 RX ADMIN — OXYCODONE HYDROCHLORIDE AND ACETAMINOPHEN PRN TAB: 5; 325 TABLET ORAL at 09:37

## 2018-05-26 RX ADMIN — OXYCODONE HYDROCHLORIDE AND ACETAMINOPHEN PRN TAB: 5; 325 TABLET ORAL at 00:45

## 2018-05-26 RX ADMIN — LEVALBUTEROL SCH MG: 0.63 SOLUTION RESPIRATORY (INHALATION) at 07:15

## 2018-05-26 RX ADMIN — IPRATROPIUM BROMIDE SCH MG: 0.5 SOLUTION RESPIRATORY (INHALATION) at 01:05

## 2018-05-26 NOTE — CP.PCM.PN
Subjective





- Date & Time of Evaluation


Date of Evaluation: 05/26/18


Time of Evaluation: 12:40





- Subjective


Subjective: 





F/U  S/P Laminectomy.


complains  of  post-op pain last  night with difficulty to sleep , requesting  

pain medication, increase  movement LLE





Objective





- Vital Signs/Intake and Output


Vital Signs (last 24 hours): 


 











Temp Pulse Resp BP Pulse Ox


 


 98 F   61   20   125/84   96 


 


 05/26/18 16:17  05/26/18 16:17  05/26/18 16:17  05/26/18 16:17  05/26/18 16:17








Intake and Output: 


 











 05/26/18 05/26/18





 06:59 18:59


 


Intake Total 100 


 


Balance 100 














- Medications


Medications: 


 Current Medications





Albuterol (Ventolin Hfa 90 Mcg/Actuation (8 G))  2 puff IH Q4 PRN


   PRN Reason: Shortness of Breath


   Last Admin: 05/24/18 06:27 Dose:  2 puff


Allopurinol (Zyloprim)  100 mg PO DAILY [facility]


   Last Admin: 05/26/18 09:24 Dose:  100 mg


Bacitracin (Bacitracin Oint)  1 applic TOP DAILY [facility]


   Last Admin: 05/26/18 09:26 Dose:  1 applic


Carvedilol (Coreg)  25 mg PO Q12 [facility]


   Last Admin: 05/26/18 09:24 Dose:  25 mg


Dexamethasone (Decadron)  20 mg PO DAILY [facility]


   Last Admin: 05/26/18 09:22 Dose:  20 mg


Digoxin (Digoxin)  0.125 mg PO DAILY [facility]


   Last Admin: 05/26/18 09:22 Dose:  0.125 mg


Famotidine (Pepcid)  20 mg PO BID [facility]


   Last Admin: 05/26/18 16:02 Dose:  20 mg


Furosemide (Lasix)  20 mg PO DAILY [facility]


   Last Admin: 05/26/18 09:25 Dose:  20 mg


Cefepime HCl 2 gm/ Sodium (Chloride)  100 mls @ 100 mls/hr IVPB Q12 [facility]


   PRN Reason: Protocol


   Last Admin: 05/26/18 09:26 Dose:  100 mls/hr


Vancomycin HCl 1 gm/ Sodium (Chloride)  250 mls @ 166.667 mls/hr IVPB Q12 [facility]


   PRN Reason: Protocol


   Last Admin: 05/26/18 09:33 Dose:  166.667 mls/hr


Ipratropium Bromide (Atrovent)  0.5 mg IH RQ6 [facility]


   Last Admin: 05/26/18 13:11 Dose:  0.5 mg


Levalbuterol HCl (Xopenex)  0.63 mg INH RQ6 [facility]


   Last Admin: 05/26/18 13:11 Dose:  0.63 mg


Lisinopril (Zestril)  10 mg PO DAILY [facility]


   Last Admin: 05/26/18 09:53 Dose:  10 mg


Morphine Sulfate (Morphine)  2 mg IVP Q4 PRN


   PRN Reason: Pain, severe (8-10)


   Last Admin: 05/26/18 15:45 Dose:  2 mg


Nystatin (Nystop Topical Powder)  1 applic TOP TID [facility]


   Last Admin: 05/26/18 16:01 Dose:  1 applic











- Labs


Labs: 


 





 05/26/18 06:30 





 05/26/18 06:30 





 











PT  13.2 Seconds (9.8-13.1)  H  05/24/18  04:30    


 


INR  1.2  (0.9-1.2)   05/24/18  04:30    


 


APTT  26.4 Seconds (25.6-37.1)   05/24/18  04:30    














- Constitutional


Appears: No Acute Distress





- Head Exam


Head Exam: NORMAL INSPECTION





- Eye Exam


Eye Exam: PERRL





- ENT Exam


ENT Exam: Normal Exam





- Neck Exam


Neck Exam: Normal Inspection





- Respiratory Exam


Respiratory Exam: Clear to Ausculation Bilateral





- Cardiovascular Exam


Cardiovascular Exam: Irregular Rhythm





- GI/Abdominal Exam


GI & Abdominal Exam: Soft, Normal Bowel Sounds





- Extremities Exam


Extremities Exam: Normal Inspection





- Back Exam


Back Exam: tenderness (L-S)


Additional comments: 





surgical incision healing well , staples in place





- Neurological Exam


Neurological Exam: Alert, CN II-XII Intact, Oriented x3


Additional comments: 





increased movement LLE, almost  to  full extension , able to move left foot, 

mild numbness distal leg foot.   RLE full ROM.





- Psychiatric Exam


Psychiatric exam: Normal Affect, Normal Mood





- Skin


Skin Exam: Warm





Assessment and Plan


(1) Status post laminectomy


Status: Acute   





(2) Epidural abscess


Assessment & Plan: 


wound  C-S Staph


Status: Acute   





(3) Spinal cord compression


Status: Acute   





(4) Staph aureus infection


Assessment & Plan: 


wound


Status: Acute   





(5) Multiple myeloma


Status: Chronic   





(6) Afib


Status: Chronic   





(7) HTN (hypertension)


Status: Chronic   





(8) Urinary tract infection


Status: Acute   





(9) Coagulopathy


Status: Acute   





(10) Pneumonia


Status: Acute   





- Assessment and Plan (Free Text)


Plan: 





Vanco  for  wound Staph , Cefepime  for  PNA  and  UTI E Coli , increase 

Morphine , continue  rest  of  treatment , for  Life Port and f/u Chemo , PT , 

able  to ambulate  with walker with PT help, f/u ID

## 2018-05-26 NOTE — CP.PCM.PN
Subjective





- Date & Time of Evaluation


Date of Evaluation: 05/26/18


Time of Evaluation: 08:52





- Subjective


Subjective: 





SPINE - POD #3





Pt resting in bed. Continues to improve. Able to transfer bed to chair 

yesterday with PT. Minimal c/o pain in back. 





VSS  Afebrile. Good strength in R LE. Now with active motion of L LE, including 

quad contraction, but waek. Incision clean and dry. C/S + for Staph aureus





Plan: Continue to mobilize as tolerated. Agree with plan for acute rehab. 

Staples to be removed in 2 weeks.





Objective





- Vital Signs/Intake and Output


Vital Signs (last 24 hours): 


 











Temp Pulse Resp BP Pulse Ox


 


 98.3 F   68   20   143/87   98 


 


 05/26/18 07:40  05/26/18 07:40  05/26/18 07:40  05/26/18 07:40  05/26/18 07:40








Intake and Output: 


 











 05/26/18 05/26/18





 06:59 18:59


 


Intake Total 100 


 


Balance 100 














- Medications


Medications: 


 Current Medications





Albuterol (Ventolin Hfa 90 Mcg/Actuation (8 G))  2 puff IH Q4 PRN


   PRN Reason: Shortness of Breath


   Last Admin: 05/24/18 06:27 Dose:  2 puff


Allopurinol (Zyloprim)  100 mg PO DAILY [facility]


   Last Admin: 05/25/18 08:43 Dose:  100 mg


Bacitracin (Bacitracin Oint)  1 applic TOP DAILY [facility]


   Last Admin: 05/25/18 08:41 Dose:  1 applic


Carvedilol (Coreg)  25 mg PO Q12 [facility]


   Last Admin: 05/25/18 20:48 Dose:  25 mg


Dexamethasone (Decadron)  20 mg PO DAILY [facility]


   Last Admin: 05/25/18 08:42 Dose:  20 mg


Digoxin (Digoxin)  0.125 mg PO DAILY [facility]


   Last Admin: 05/25/18 08:42 Dose:  0.125 mg


Famotidine (Pepcid)  20 mg PO BID [facility]


   Last Admin: 05/25/18 16:10 Dose:  20 mg


Furosemide (Lasix)  20 mg PO DAILY [facility]


   Last Admin: 05/25/18 08:42 Dose:  20 mg


Cefepime HCl 2 gm/ Sodium (Chloride)  100 mls @ 100 mls/hr IVPB Q12 [facility]


   PRN Reason: Protocol


Vancomycin HCl 1 gm/ Sodium (Chloride)  250 mls @ 166.667 mls/hr IVPB Q12 [facility]


   PRN Reason: Protocol


Ipratropium Bromide (Atrovent)  0.5 mg IH RQ6 [facility]


   Last Admin: 05/26/18 07:15 Dose:  0.5 mg


Levalbuterol HCl (Xopenex)  0.63 mg INH RQ6 [facility]


   Last Admin: 05/26/18 07:15 Dose:  0.63 mg


Lisinopril (Zestril)  10 mg PO DAILY [facility]


   Last Admin: 05/25/18 08:43 Dose:  10 mg


Morphine Sulfate (Morphine)  1 mg IVP Q4 PRN


   PRN Reason: Pain, severe (8-10)


   Last Admin: 05/25/18 08:38 Dose:  1 mg


Nystatin (Nystop Topical Powder)  1 applic TOP TID [facility]


   Last Admin: 05/25/18 16:10 Dose:  1 applic


Oxycodone/Acetaminophen (Percocet 5/325 Mg Tab)  1 tab PO Q4 PRN


   PRN Reason: Pain, Mild (1-3)


   Stop: 05/26/18 14:33


   Last Admin: 05/26/18 00:45 Dose:  1 tab











- Labs


Labs: 


 





 05/26/18 06:30 





 05/26/18 06:30 





 











PT  13.2 Seconds (9.8-13.1)  H  05/24/18  04:30    


 


INR  1.2  (0.9-1.2)   05/24/18  04:30    


 


APTT  26.4 Seconds (25.6-37.1)   05/24/18  04:30

## 2018-05-26 NOTE — CP.PCM.PN
Subjective





- Date & Time of Evaluation


Date of Evaluation: 05/26/18


Time of Evaluation: 10:36





- Subjective


Subjective: 





Pt is making good progress day by day. She is sitting in a chair able to move 

both her lower extremities below the knee, but needs physical therapy for the 

proximal muscle strength, She would benefit from acute physical therapy.


Will have a live port put in on Tuesday and continue her chemotherapy


She is on cefipine for the staph grown in the abscess drained and  the pneumonia





Objective





- Vital Signs/Intake and Output


Vital Signs (last 24 hours): 


 











Temp Pulse Resp BP Pulse Ox


 


 98.3 F   68   20   143/87   98 


 


 05/26/18 07:40  05/26/18 09:53  05/26/18 07:40  05/26/18 09:53  05/26/18 07:40








Intake and Output: 


 











 05/26/18 05/26/18





 06:59 18:59


 


Intake Total 100 


 


Balance 100 














- Medications


Medications: 


 Current Medications





Albuterol (Ventolin Hfa 90 Mcg/Actuation (8 G))  2 puff IH Q4 PRN


   PRN Reason: Shortness of Breath


   Last Admin: 05/24/18 06:27 Dose:  2 puff


Allopurinol (Zyloprim)  100 mg PO DAILY [facility]


   Last Admin: 05/26/18 09:24 Dose:  100 mg


Bacitracin (Bacitracin Oint)  1 applic TOP DAILY [facility]


   Last Admin: 05/26/18 09:26 Dose:  1 applic


Carvedilol (Coreg)  25 mg PO Q12 [facility]


   Last Admin: 05/26/18 09:24 Dose:  25 mg


Dexamethasone (Decadron)  20 mg PO DAILY [facility]


   Last Admin: 05/26/18 09:22 Dose:  20 mg


Digoxin (Digoxin)  0.125 mg PO DAILY [facility]


   Last Admin: 05/26/18 09:22 Dose:  0.125 mg


Famotidine (Pepcid)  20 mg PO BID [facility]


   Last Admin: 05/26/18 09:25 Dose:  20 mg


Furosemide (Lasix)  20 mg PO DAILY [facility]


   Last Admin: 05/26/18 09:25 Dose:  20 mg


Cefepime HCl 2 gm/ Sodium (Chloride)  100 mls @ 100 mls/hr IVPB Q12 [facility]


   PRN Reason: Protocol


   Last Admin: 05/26/18 09:26 Dose:  100 mls/hr


Vancomycin HCl 1 gm/ Sodium (Chloride)  250 mls @ 166.667 mls/hr IVPB Q12 [facility]


   PRN Reason: Protocol


   Last Admin: 05/26/18 09:33 Dose:  166.667 mls/hr


Ipratropium Bromide (Atrovent)  0.5 mg IH RQ6 [facility]


   Last Admin: 05/26/18 07:15 Dose:  0.5 mg


Levalbuterol HCl (Xopenex)  0.63 mg INH RQ6 [facility]


   Last Admin: 05/26/18 07:15 Dose:  0.63 mg


Lisinopril (Zestril)  10 mg PO DAILY [facility]


   Last Admin: 05/26/18 09:53 Dose:  10 mg


Morphine Sulfate (Morphine)  1 mg IVP Q4 PRN


   PRN Reason: Pain, severe (8-10)


   Last Admin: 05/26/18 09:44 Dose:  1 mg


Nystatin (Nystop Topical Powder)  1 applic TOP TID [facility]


   Last Admin: 05/26/18 09:25 Dose:  1 applic


Oxycodone/Acetaminophen (Percocet 5/325 Mg Tab)  1 tab PO Q4 PRN


   PRN Reason: Pain, Mild (1-3)


   Stop: 05/26/18 14:33


   Last Admin: 05/26/18 00:45 Dose:  1 tab











- Labs


Labs: 


 





 05/26/18 06:30 





 05/26/18 06:30 





 











PT  13.2 Seconds (9.8-13.1)  H  05/24/18  04:30    


 


INR  1.2  (0.9-1.2)   05/24/18  04:30    


 


APTT  26.4 Seconds (25.6-37.1)   05/24/18  04:30

## 2018-05-27 LAB
ALBUMIN SERPL-MCNC: 3 G/DL (ref 3.5–5)
ALBUMIN/GLOB SERPL: 0.9 {RATIO} (ref 1–2.1)
ALT SERPL-CCNC: 83 U/L (ref 9–52)
AST SERPL-CCNC: 28 U/L (ref 14–36)
BUN SERPL-MCNC: 14 MG/DL (ref 7–17)
CALCIUM SERPL-MCNC: 8.7 MG/DL (ref 8.4–10.2)
GFR NON-AFRICAN AMERICAN: > 60

## 2018-05-27 RX ADMIN — LEVALBUTEROL SCH MG: 0.63 SOLUTION RESPIRATORY (INHALATION) at 13:08

## 2018-05-27 RX ADMIN — DIGOXIN SCH MG: 0.12 TABLET ORAL at 09:02

## 2018-05-27 RX ADMIN — BACITRACIN SCH APPLIC: 500 OINTMENT TOPICAL at 09:54

## 2018-05-27 RX ADMIN — LEVALBUTEROL SCH MG: 0.63 SOLUTION RESPIRATORY (INHALATION) at 19:54

## 2018-05-27 RX ADMIN — IPRATROPIUM BROMIDE SCH MG: 0.5 SOLUTION RESPIRATORY (INHALATION) at 19:54

## 2018-05-27 RX ADMIN — IPRATROPIUM BROMIDE SCH MG: 0.5 SOLUTION RESPIRATORY (INHALATION) at 13:07

## 2018-05-27 RX ADMIN — IPRATROPIUM BROMIDE SCH MG: 0.5 SOLUTION RESPIRATORY (INHALATION) at 01:04

## 2018-05-27 RX ADMIN — LEVALBUTEROL SCH MG: 0.63 SOLUTION RESPIRATORY (INHALATION) at 01:04

## 2018-05-27 RX ADMIN — LEVALBUTEROL SCH MG: 0.63 SOLUTION RESPIRATORY (INHALATION) at 07:33

## 2018-05-27 RX ADMIN — IPRATROPIUM BROMIDE SCH MG: 0.5 SOLUTION RESPIRATORY (INHALATION) at 07:34

## 2018-05-27 NOTE — CP.PCM.PN
Subjective





- Date & Time of Evaluation


Date of Evaluation: 05/27/18


Time of Evaluation: 13:30





- Subjective


Subjective: 


F/U  S/P Laminectomy





N/C of  post-op surgical pain today, has  not  required IV pain medication





Objective





- Vital Signs/Intake and Output


Vital Signs (last 24 hours): 


 











Temp Pulse Resp BP Pulse Ox


 


 97.9 F   92 H  20   115/74   98 


 


 05/27/18 16:02  05/27/18 16:02  05/27/18 16:02  05/27/18 16:02  05/27/18 17:07











- Medications


Medications: 


 Current Medications





Albuterol (Ventolin Hfa 90 Mcg/Actuation (8 G))  2 puff IH Q4 PRN


   PRN Reason: Shortness of Breath


   Last Admin: 05/24/18 06:27 Dose:  2 puff


Allopurinol (Zyloprim)  100 mg PO DAILY [facility]


   Last Admin: 05/27/18 09:06 Dose:  100 mg


Bacitracin (Bacitracin Oint)  1 applic TOP DAILY [facility]


   Last Admin: 05/27/18 09:54 Dose:  1 applic


Carvedilol (Coreg)  25 mg PO Q12 [facility]


   Last Admin: 05/27/18 08:58 Dose:  25 mg


Dexamethasone (Decadron)  20 mg PO DAILY [facility]


   Last Admin: 05/27/18 09:00 Dose:  20 mg


Digoxin (Digoxin)  0.125 mg PO DAILY [facility]


   Last Admin: 05/27/18 09:02 Dose:  0.125 mg


Famotidine (Pepcid)  20 mg PO BID [facility]


   Last Admin: 05/27/18 16:41 Dose:  20 mg


Furosemide (Lasix)  20 mg PO DAILY [facility]


   Last Admin: 05/27/18 08:59 Dose:  20 mg


Cefepime HCl 2 gm/ Sodium (Chloride)  100 mls @ 100 mls/hr IVPB Q12 [facility]


   PRN Reason: Protocol


   Last Admin: 05/27/18 09:01 Dose:  100 mls/hr


Vancomycin HCl 1 gm/ Sodium (Chloride)  250 mls @ 166.667 mls/hr IVPB Q12 [facility]


   PRN Reason: Protocol


   Last Admin: 05/27/18 11:07 Dose:  166.667 mls/hr


Ipratropium Bromide (Atrovent)  0.5 mg IH RQ6 [facility]


   Last Admin: 05/27/18 13:07 Dose:  0.5 mg


Levalbuterol HCl (Xopenex)  0.63 mg INH RQ6 [facility]


   Last Admin: 05/27/18 13:08 Dose:  0.63 mg


Lisinopril (Zestril)  10 mg PO DAILY [facility]


   Last Admin: 05/27/18 09:06 Dose:  10 mg


Morphine Sulfate (Morphine)  2 mg IVP Q4 PRN


   PRN Reason: Pain, severe (8-10)


   Last Admin: 05/27/18 12:04 Dose:  2 mg


Nystatin (Nystop Topical Powder)  1 applic TOP TID [facility]


   Last Admin: 05/27/18 14:29 Dose:  1 applic


Oxycodone/Acetaminophen (Percocet 5/325 Mg Tab)  1 tab PO Q4 PRN


   PRN Reason: Pain, Mild (1-3)


   Stop: 05/30/18 11:47











- Labs


Labs: 


 





 05/26/18 06:30 





 05/27/18 05:30 





 











PT  13.2 Seconds (9.8-13.1)  H  05/24/18  04:30    


 


INR  1.2  (0.9-1.2)   05/24/18  04:30    


 


APTT  26.4 Seconds (25.6-37.1)   05/24/18  04:30    














- Constitutional


Appears: No Acute Distress





- Head Exam


Head Exam: NORMAL INSPECTION





- Eye Exam


Eye Exam: PERRL





- ENT Exam


ENT Exam: Normal Exam





- Neck Exam


Neck Exam: Normal Inspection





- Respiratory Exam


Respiratory Exam: Rhonchi (scattered)





- Cardiovascular Exam


Cardiovascular Exam: REGULAR RHYTHM





- GI/Abdominal Exam


GI & Abdominal Exam: Soft, Normal Bowel Sounds





- Back Exam


Back Exam: tenderness


Additional comments: 





tenderness L-S , surgical incision healing well , staples





- Neurological Exam


Neurological Exam: Awake, Oriented x3


Additional comments: 





increased  movement LLE , almost  full extension  L leg , unable  to flex  L 

thigh , limited  movement  L foot, RLE full ROM





Assessment and Plan


(1) Status post laminectomy


Status: Acute   





(2) Epidural abscess


Status: Acute   





(3) Spinal cord compression


Status: Acute   





(4) Staph aureus infection


Status: Acute   





(5) Multiple myeloma


Status: Chronic   





(6) Afib


Status: Chronic   





(7) HTN (hypertension)


Status: Chronic   





(8) Urinary tract infection


Status: Acute   





(9) Coagulopathy


Status: Acute   





(10) Pneumonia


Status: Acute

## 2018-05-27 NOTE — CP.PCM.PN
Subjective





- Date & Time of Evaluation


Date of Evaluation: 05/27/18


Time of Evaluation: 15:05





- Subjective


Subjective: 





General condition u nchanged, the right leg shows a lot of improvement , the 

left is only a little bit better. She is on antibiotics for the pneumonia and 

the staph aureus infexction in the spine.





Objective





- Vital Signs/Intake and Output


Vital Signs (last 24 hours): 


 











Temp Pulse Resp BP Pulse Ox


 


 97.8 F   90   20   132/83   95 


 


 05/27/18 08:12  05/27/18 09:06  05/27/18 08:12  05/27/18 09:06  05/27/18 08:12











- Medications


Medications: 


 Current Medications





Albuterol (Ventolin Hfa 90 Mcg/Actuation (8 G))  2 puff IH Q4 PRN


   PRN Reason: Shortness of Breath


   Last Admin: 05/24/18 06:27 Dose:  2 puff


Allopurinol (Zyloprim)  100 mg PO DAILY [facility]


   Last Admin: 05/27/18 09:06 Dose:  100 mg


Bacitracin (Bacitracin Oint)  1 applic TOP DAILY [facility]


   Last Admin: 05/27/18 09:54 Dose:  1 applic


Carvedilol (Coreg)  25 mg PO Q12 [facility]


   Last Admin: 05/27/18 08:58 Dose:  25 mg


Dexamethasone (Decadron)  20 mg PO DAILY [facility]


   Last Admin: 05/27/18 09:00 Dose:  20 mg


Digoxin (Digoxin)  0.125 mg PO DAILY [facility]


   Last Admin: 05/27/18 09:02 Dose:  0.125 mg


Famotidine (Pepcid)  20 mg PO BID [facility]


   Last Admin: 05/27/18 08:58 Dose:  20 mg


Furosemide (Lasix)  20 mg PO DAILY [facility]


   Last Admin: 05/27/18 08:59 Dose:  20 mg


Cefepime HCl 2 gm/ Sodium (Chloride)  100 mls @ 100 mls/hr IVPB Q12 [facility]


   PRN Reason: Protocol


   Last Admin: 05/27/18 09:01 Dose:  100 mls/hr


Vancomycin HCl 1 gm/ Sodium (Chloride)  250 mls @ 166.667 mls/hr IVPB Q12 [facility]


   PRN Reason: Protocol


   Last Admin: 05/27/18 11:07 Dose:  166.667 mls/hr


Ipratropium Bromide (Atrovent)  0.5 mg IH RQ6 [facility]


   Last Admin: 05/27/18 13:07 Dose:  0.5 mg


Levalbuterol HCl (Xopenex)  0.63 mg INH RQ6 [facility]


   Last Admin: 05/27/18 13:08 Dose:  0.63 mg


Lisinopril (Zestril)  10 mg PO DAILY [facility]


   Last Admin: 05/27/18 09:06 Dose:  10 mg


Morphine Sulfate (Morphine)  2 mg IVP Q4 PRN


   PRN Reason: Pain, severe (8-10)


   Last Admin: 05/27/18 12:04 Dose:  2 mg


Nystatin (Nystop Topical Powder)  1 applic TOP TID [facility]


   Last Admin: 05/27/18 14:29 Dose:  1 applic


Oxycodone/Acetaminophen (Percocet 5/325 Mg Tab)  1 tab PO Q4 PRN


   PRN Reason: Pain, Mild (1-3)


   Stop: 05/30/18 11:47











- Labs


Labs: 


 





 05/26/18 06:30 





 05/27/18 05:30 





 











PT  13.2 Seconds (9.8-13.1)  H  05/24/18  04:30    


 


INR  1.2  (0.9-1.2)   05/24/18  04:30    


 


APTT  26.4 Seconds (25.6-37.1)   05/24/18  04:30

## 2018-05-27 NOTE — CP.PCM.CON
History of Present Illness





- History of Present Illness


History of Present Illness: 





ID consulted for UTI  s/p laminectomy and drainage of epidural abscess 





66F with chronic A fib on Xarelto; metastatic Multiple Myeloma, on Chemotx, 

admitted 2 days ago for progressive LLE weakness / flaccidity, and concomitant 

weakness of RLE too; falls were reported. Further w/u with MRIs showed SPC 

compression of T11-12 and an epidural abscess. After adequate time  off Xarelto

, she underwent surgery today for T11-12 Laminectomy. Intra-op fluids given 

include 400ml , and urine output of 200+ml. EBL was very minimal. She was able 

to be extubated post-op. Awake, alert and appropriately responsive upon arrival 

to ICU.





Other vitals and I/O's reviewed. 





ROS:  No other pertinent negs or positives on 10+  system review. 





ALLERGIES:  NKDA





Home Meds:  Ventolin HFA, ASA, Xarelto, Allopurinol, Coreg, Decadron, Digoxin, 

Pepcid, Lasix, Revlimid, Zestril. 





PMSFH:   All other Nursing and physician documentation reviewed to date; no new 

pertinent info noted relevant to current medical problems.























Review of Systems





- Constitutional


Constitutional: As Per HPI





- EENT


Eyes: absent: As Per HPI, Blind Spots, Blurred Vision, Change in Vision, 

Decreased Night Vision, Diplopia, Discharge, Dry Eye, Exophthalmos, Floaters, 

Irritation, Itchy Eyes, Loss of Peripheral Vision, Pain, Photophobia, Requires 

Corrective Lenses, Sees Flashes, Spots in Vision, Tunnel Vision, Other Visual 

Disturbances, Loss of Vision, Other


Ears: absent: As Per HPI, Decreased Hearing, Ear Discharge, Ear Pain, Tinnitus, 

Abnormal Hearing, Disequilibrium, Dizziness, Other


Nose/Mouth/Throat: absent: As Per HPI, Epistaxis, Nasal Congestion, Nasal 

Discharge, Nasal Obstruction, Nasal Trauma, Nose Pain, Post Nasal Drip, Sinus 

Pain, Sinus Pressure, Bleeding Gums, Change in Voice, Dental Pain, Dry Mouth, 

Dysphagia, Halitosis, Hoarsness, Lip Swelling, Mouth Lesions, Mouth Pain, 

Odynophagia, Sore Throat, Throat Swelling, Tongue Swelling, Facial Pain, Neck 

Pain, Neck Mass, Other





- Breasts


Breasts: absent: As Per HPI, Change in Shape, Mass, Pain, Nipple Discharge, 

Nipple Inversion, Skin Changes, Swelling, Other





- Cardiovascular


Cardiovascular: absent: As Per HPI, Acrocyanosis, Chest Pain, Chest Pain at Rest

, Chest Pain with Activity, Claudication, Diaphoresis, Dyspnea, Dyspnea on 

Exertion, Edema, Irregular Heart Rhythm, Pain Radiating to Arm/Neck/Jaw, Leg 

Edema, Leg Ulcers, Lightheadedness, Orthopnea, Palpitations, Paroxysmal 

Nocturnal Dyspnea, Pedal Edema, Radiating Pain, Rapid Heart Rate, Slow Heart 

Rate, Syncope, Other





- Respiratory


Respiratory: absent: As Per HPI, Cough, Dyspnea, Hemoptysis, Dyspnea on Exertion

, Wheezing, Snoring, Stridor, Pain on Inspiration, Chest Congestion, Excessive 

Mucous Production, Change in Mucous Color, Pain with Coughing, Other





- Gastrointestinal


Gastrointestinal: absent: As Per HPI, Abdominal Pain, Belching, Bloating, 

Change in Bowel Habits, Change in Stool Character, Coffee Ground Emesis, 

Constipation, Cramping, Diarrhea, Dyspepsia, Dysphagia, Early Satiety, 

Excessive Flatus, Fecal Incontinence, Heartburn, Hematemesis, Hematochezia, 

Loose Stools, Melena, Nausea, Odynophagia, Temesmus, Vomiting, Other





- Genitourinary


Genitourinary: absent: As Per HPI, Change in Urinary Stream, Difficulty 

Urinating, Dysuria, Flank Pain, Hematuria, Pyuria, Nocturia, Urinary 

Incontinence, Urinary Frequency, Urinary Hesitance, Urinary Urgency, Voiding 

Freq/Small Amts, Freq UTI, Hx Renal/Bladder Calculi, Hx /Renal Surgery, 

Bladder Distension, Other





- Reproductive: Female


Reproductive:Female: absent: As Per HPI, Amenorrhea, Amenorrhea/Birth Control, 

Currently Menstual, Cycle <21 Days, Cycle >35 Days, Cycle Variable, Menses 1-7 

Days, Menses >/= 8 Days, Menses Variable, Cycle > 4 Weeks Between, No Menses 

for 6 Months, Heavy Menses, Light Menses, Normal Menses, Spotting Between Cycles

, S/P Hysterectomy, Menopausal, Post Menopausal, Premenarche, Abnormal Vaginal 

Bleeding, Dysmenorrhea, Dyspareunia, Genital Lesions, Genital Pruritis, Pelvic 

Pain, Prolapse Symptoms, Sexual Dysfunction, Vaginal Discharge, Vaginal Dryness

, Vaginal Odor, Vaginal Pruritis, Other





- Menstruation


Menstruation: absent: As Per HPI, Amenorrhea, Amenorrhea/Birth Control, 

Currently Menstual, Cycle <21 Days, Cycle >35 Days, Cycle Variable, Menses 1-7 

Days, Menses >/= 8 Days, Menses Variable, Cycle > 4 Weeks Between, No Menses 

for 6 Months, Heavy Menses, Light Menses, Normal Menses, Spotting Between Cycles

, S/P Hysterectomy, Menopausal, Post Menopausal, Premenarche, Abnormal Vaginal 

Bleeding, Dysmenorrhea, Other





- Musculoskeletal


Musculoskeletal: As Per HPI





- Integumentary


Integumentary: absent: As Per HPI, Acne, Alopecia, Bleeding Lesions, Change in 

Hair, Change in Nails, Change in Pigmentation, Changing Lesions, Dry Skin, 

Erythema, Furuncle, Hirsutism, Lesions, New Lesions, Non-Healing Lesions, 

Photosensitivity, Pruritus, Rash, Skin Pain, Skin Ulcer, Sores, Striae, Swelling

, Unusual Bruising, Wounds, Jaundice, Other





- Neurological


Neurological: As Per HPI





- Psychiatric


Psychiatric: absent: As Per HPI, Abnormal Sleep Pattern, Anhedonia, Anxiety, 

Auditory Hallucinations, Behavioral Changes, Change in Appetite, Change in 

Libido, Confusion, Depression, Difficulty Concentrating, Hallucinations, 

Homicidal Ideation, Hopelessness, Irritability, Memory Loss, Mood Swings, Panic 

Attacks, Paranoia, Suicidal Ideation, Visual Hallucinations, Tactile 

Hallucinations, Other





- Endocrine


Endocrine: absent: As Per HPI, Change in Body Appearance, Change in Libido, 

Cold Intolorance, Deepening of Voice, Excessive Sweating, Fatigue, Flushing, 

Heat Intolorance, Increase in Ring/Shoe/Hat Size, Palpitations, Polydipsia, 

Polyphagia, Polyuria, Other





- Hematologic/Lymphatic


Hematologic: absent: As Per HPI, Easy Bleeding, Easy Bruising, Lymphadenopathy, 

Other





Past Patient History





- Infectious Disease


Hx of Infectious Diseases: None





- Tetanus Immunizations


Tetanus Immunization: Unknown





- Past Medical History & Family History


Past Medical History?: Yes





- Past Social History


Smoking Status: Never Smoked





- CARDIAC


Hx Cardiac Disorders: Yes


Hx Atrial Fibrillation: Yes


Hx Congestive Heart Failure: Yes


Hx Hypertension: Yes





- PULMONARY


Hx Respiratory Disorders: Yes


Hx Pneumonia: Yes





- NEUROLOGICAL


Hx Neurological Disorder: No





- HEENT


Hx HEENT Problems: No





- RENAL


Hx Chronic Kidney Disease: No





- ENDOCRINE/METABOLIC


Hx Endocrine Disorders: No





- HEMATOLOGICAL/ONCOLOGICAL


Hx AIDS: No


Hx Human Immunodeficiency Virus (HIV): No


Other/Comment: Multiple  Myeloma





- INTEGUMENTARY


Hx Dermatological Problems: No





- MUSCULOSKELETAL/RHEUMATOLOGICAL


Hx Musculoskeletal Disorders: Yes


Hx Falls: Yes


Hx Unsteady Gait: Yes





- GASTROINTESTINAL


Hx Gastrointestinal Disorders: No





- GENITOURINARY/GYNECOLOGICAL


Hx Genitourinary Disorders: Yes


Hx Urinary Tract Infection: Yes


Other/Comment: Uterine Fibroids





- PSYCHIATRIC


Hx Psychophysiologic Disorder: No


Hx Substance Use: No





- SURGICAL HISTORY


Hx Surgeries: Yes


Hx Appendectomy: Yes


Hx Cholecystectomy: Yes





- ANESTHESIA


Hx Anesthesia: Yes


Hx Anesthesia Reactions: No


Hx Malignant Hyperthermia: No





Meds


Allergies/Adverse Reactions: 


 Allergies











Allergy/AdvReac Type Severity Reaction Status Date / Time


 


No Known Allergies Allergy   Verified 05/21/18 11:20














- Medications


Medications: 


 Current Medications





Albuterol (Ventolin Hfa 90 Mcg/Actuation (8 G))  2 puff IH Q4 PRN


   PRN Reason: Shortness of Breath


   Last Admin: 05/24/18 06:27 Dose:  2 puff


Allopurinol (Zyloprim)  100 mg PO DAILY [facility]


   Last Admin: 05/27/18 09:06 Dose:  100 mg


Bacitracin (Bacitracin Oint)  1 applic TOP DAILY [facility]


   Last Admin: 05/27/18 09:54 Dose:  1 applic


Carvedilol (Coreg)  25 mg PO Q12 [facility]


   Last Admin: 05/27/18 08:58 Dose:  25 mg


Dexamethasone (Decadron)  20 mg PO DAILY [facility]


   Last Admin: 05/27/18 09:00 Dose:  20 mg


Digoxin (Digoxin)  0.125 mg PO DAILY [facility]


   Last Admin: 05/27/18 09:02 Dose:  0.125 mg


Famotidine (Pepcid)  20 mg PO BID [facility]


   Last Admin: 05/27/18 08:58 Dose:  20 mg


Furosemide (Lasix)  20 mg PO DAILY [facility]


   Last Admin: 05/27/18 08:59 Dose:  20 mg


Cefepime HCl 2 gm/ Sodium (Chloride)  100 mls @ 100 mls/hr IVPB Q12 [facility]


   PRN Reason: Protocol


   Last Admin: 05/27/18 09:01 Dose:  100 mls/hr


Vancomycin HCl 1 gm/ Sodium (Chloride)  250 mls @ 166.667 mls/hr IVPB Q12 [facility]


   PRN Reason: Protocol


   Last Admin: 05/27/18 11:07 Dose:  166.667 mls/hr


Ipratropium Bromide (Atrovent)  0.5 mg IH RQ6 [facility]


   Last Admin: 05/27/18 13:07 Dose:  0.5 mg


Levalbuterol HCl (Xopenex)  0.63 mg INH RQ6 [facility]


   Last Admin: 05/27/18 13:08 Dose:  0.63 mg


Lisinopril (Zestril)  10 mg PO DAILY [facility]


   Last Admin: 05/27/18 09:06 Dose:  10 mg


Morphine Sulfate (Morphine)  2 mg IVP Q4 PRN


   PRN Reason: Pain, severe (8-10)


   Last Admin: 05/27/18 12:04 Dose:  2 mg


Nystatin (Nystop Topical Powder)  1 applic TOP TID [facility]


   Last Admin: 05/27/18 09:45 Dose:  1 applic


Oxycodone/Acetaminophen (Percocet 5/325 Mg Tab)  1 tab PO Q4 PRN


   PRN Reason: Pain, Mild (1-3)


   Stop: 05/30/18 11:47











Physical Exam





- Constitutional


Appears: Non-toxic, Chronically Ill





- Head Exam


Head Exam: NORMOCEPHALIC





- Eye Exam


Eye Exam: PERRL





- ENT Exam


ENT Exam: Mucous Membranes Dry





- Neck Exam


Neck exam: Negative for: Lymphadenopathy





- Respiratory Exam


Respiratory Exam: Decreased Breath Sounds, Clear to Auscultation Bilateral





- Cardiovascular Exam


Cardiovascular Exam: REGULAR RHYTHM, +S1, +S2





- GI/Abdominal Exam


GI & Abdominal Exam: Diminished Bowel Sounds, Soft.  absent: Tenderness





- Rectal Exam


Rectal Exam: Deferred





-  Exam


 Exam: NORMAL INSPECTION





- Extremities Exam


Extremities exam: Positive for: pedal pulses present.  Negative for: calf 

tenderness, pedal edema





- Back Exam


Back exam: absent: CVA tenderness (L), CVA tenderness (R)





- Neurological Exam


Neurological exam: Alert, CN II-XII Intact, Oriented x3, Reflexes Normal





- Psychiatric Exam


Psychiatric exam: Normal Mood





- Skin


Skin Exam: Dry





Results





- Vital Signs


Recent Vital Signs: 


 Last Vital Signs











Temp  97.8 F   05/27/18 08:12


 


Pulse  90   05/27/18 09:06


 


Resp  20   05/27/18 08:12


 


BP  132/83   05/27/18 09:06


 


Pulse Ox  95   05/27/18 08:12














- Labs


Result Diagrams: 


 05/26/18 06:30





 05/27/18 05:30


Labs: 


 Laboratory Results - last 24 hr











  05/26/18 05/27/18





  06:30 05:30


 


Sodium   137


 


Potassium   3.8


 


Chloride   102


 


Carbon Dioxide   28


 


Anion Gap   11


 


BUN   14


 


Creatinine   0.3 L


 


Est GFR ( Amer)   > 60


 


Est GFR (Non-Af Amer)   > 60


 


Random Glucose   132 H


 


Calcium   8.7


 


Total Bilirubin   0.7


 


AST   28


 


ALT   83 H D


 


Alkaline Phosphatase   109


 


Total Protein   6.2 L


 


Albumin   3.0 L


 


Globulin   3.3


 


Albumin/Globulin Ratio   0.9 L


 


Procalcitonin  < 0.05 L 














Assessment & Plan


(1) Bilateral leg weakness


Status: Acute   





(2) Coagulopathy


Status: Acute   Priority: High   





(3) Epidural abscess


Status: Acute   





(4) Mass in epidural space


Status: Acute   





- Assessment and Plan (Free Text)


Assessment: 





cont IV antibiotics- may need long course of rx

## 2018-05-27 NOTE — CP.PCM.PN
Subjective





- Date & Time of Evaluation


Date of Evaluation: 05/27/18


Time of Evaluation: 15:20





- Subjective


Subjective: 





                                                 Sitting OOB, recovering well 

following Sx


                                                 Motor function in Lt lower 

extremity much improved


                                                 Vital signs stable


                                                 No evidence of CHF





Objective





- Vital Signs/Intake and Output


Vital Signs (last 24 hours): 


 











Temp Pulse Resp BP Pulse Ox


 


 97.8 F   90   20   132/83   95 


 


 05/27/18 08:12  05/27/18 09:06  05/27/18 08:12  05/27/18 09:06  05/27/18 08:12











- Medications


Medications: 


 Current Medications





Albuterol (Ventolin Hfa 90 Mcg/Actuation (8 G))  2 puff IH Q4 PRN


   PRN Reason: Shortness of Breath


   Last Admin: 05/24/18 06:27 Dose:  2 puff


Allopurinol (Zyloprim)  100 mg PO DAILY [facility]


   Last Admin: 05/27/18 09:06 Dose:  100 mg


Bacitracin (Bacitracin Oint)  1 applic TOP DAILY [facility]


   Last Admin: 05/27/18 09:54 Dose:  1 applic


Carvedilol (Coreg)  25 mg PO Q12 [facility]


   Last Admin: 05/27/18 08:58 Dose:  25 mg


Dexamethasone (Decadron)  20 mg PO DAILY [facility]


   Last Admin: 05/27/18 09:00 Dose:  20 mg


Digoxin (Digoxin)  0.125 mg PO DAILY [facility]


   Last Admin: 05/27/18 09:02 Dose:  0.125 mg


Famotidine (Pepcid)  20 mg PO BID [facility]


   Last Admin: 05/27/18 08:58 Dose:  20 mg


Furosemide (Lasix)  20 mg PO DAILY [facility]


   Last Admin: 05/27/18 08:59 Dose:  20 mg


Cefepime HCl 2 gm/ Sodium (Chloride)  100 mls @ 100 mls/hr IVPB Q12 [facility]


   PRN Reason: Protocol


   Last Admin: 05/27/18 09:01 Dose:  100 mls/hr


Vancomycin HCl 1 gm/ Sodium (Chloride)  250 mls @ 166.667 mls/hr IVPB Q12 [facility]


   PRN Reason: Protocol


   Last Admin: 05/27/18 11:07 Dose:  166.667 mls/hr


Ipratropium Bromide (Atrovent)  0.5 mg IH RQ6 [facility]


   Last Admin: 05/27/18 13:07 Dose:  0.5 mg


Levalbuterol HCl (Xopenex)  0.63 mg INH RQ6 [facility]


   Last Admin: 05/27/18 13:08 Dose:  0.63 mg


Lisinopril (Zestril)  10 mg PO DAILY [facility]


   Last Admin: 05/27/18 09:06 Dose:  10 mg


Morphine Sulfate (Morphine)  2 mg IVP Q4 PRN


   PRN Reason: Pain, severe (8-10)


   Last Admin: 05/27/18 12:04 Dose:  2 mg


Nystatin (Nystop Topical Powder)  1 applic TOP TID [facility]


   Last Admin: 05/27/18 14:29 Dose:  1 applic


Oxycodone/Acetaminophen (Percocet 5/325 Mg Tab)  1 tab PO Q4 PRN


   PRN Reason: Pain, Mild (1-3)


   Stop: 05/30/18 11:47











- Labs


Labs: 


 





 05/26/18 06:30 





 05/27/18 05:30 





 











PT  13.2 Seconds (9.8-13.1)  H  05/24/18  04:30    


 


INR  1.2  (0.9-1.2)   05/24/18  04:30    


 


APTT  26.4 Seconds (25.6-37.1)   05/24/18  04:30

## 2018-05-28 VITALS — HEART RATE: 78 BPM

## 2018-05-28 RX ADMIN — LEVALBUTEROL SCH MG: 0.63 SOLUTION RESPIRATORY (INHALATION) at 02:13

## 2018-05-28 RX ADMIN — LEVALBUTEROL SCH MG: 0.63 SOLUTION RESPIRATORY (INHALATION) at 07:04

## 2018-05-28 RX ADMIN — BACITRACIN SCH APPLIC: 500 OINTMENT TOPICAL at 10:33

## 2018-05-28 RX ADMIN — LEVALBUTEROL SCH MG: 0.63 SOLUTION RESPIRATORY (INHALATION) at 13:16

## 2018-05-28 RX ADMIN — DIGOXIN SCH MG: 0.12 TABLET ORAL at 08:19

## 2018-05-28 RX ADMIN — LEVALBUTEROL SCH MG: 0.63 SOLUTION RESPIRATORY (INHALATION) at 19:00

## 2018-05-28 RX ADMIN — IPRATROPIUM BROMIDE SCH MG: 0.5 SOLUTION RESPIRATORY (INHALATION) at 07:04

## 2018-05-28 RX ADMIN — IPRATROPIUM BROMIDE SCH MG: 0.5 SOLUTION RESPIRATORY (INHALATION) at 13:16

## 2018-05-28 RX ADMIN — IPRATROPIUM BROMIDE SCH MG: 0.5 SOLUTION RESPIRATORY (INHALATION) at 02:12

## 2018-05-28 RX ADMIN — IPRATROPIUM BROMIDE SCH MG: 0.5 SOLUTION RESPIRATORY (INHALATION) at 19:00

## 2018-05-28 NOTE — CP.PCM.PN
Subjective





- Date & Time of Evaluation


Date of Evaluation: 05/28/18


Time of Evaluation: 12:00





- Subjective


Subjective: 


F/U  S/P Laminectomy





Cough , no post-op pain today





Objective





- Vital Signs/Intake and Output


Vital Signs (last 24 hours): 


 











Temp Pulse Resp BP Pulse Ox


 


 97.7 F   90   20   106/72   89 L


 


 05/28/18 15:56  05/28/18 15:56  05/28/18 15:56  05/28/18 15:56  05/28/18 15:56











- Medications


Medications: 


 Current Medications





Albuterol (Ventolin Hfa 90 Mcg/Actuation (8 G))  2 puff IH Q4 PRN


   PRN Reason: Shortness of Breath


   Last Admin: 05/24/18 06:27 Dose:  2 puff


Allopurinol (Zyloprim)  100 mg PO DAILY [facility]


   Last Admin: 05/28/18 08:15 Dose:  100 mg


Bacitracin (Bacitracin Oint)  1 applic TOP DAILY [facility]


   Last Admin: 05/28/18 10:33 Dose:  1 applic


Carvedilol (Coreg)  25 mg PO Q12 [facility]


   Last Admin: 05/28/18 08:21 Dose:  25 mg


Dexamethasone (Decadron)  20 mg PO DAILY [facility]


   Last Admin: 05/28/18 08:19 Dose:  20 mg


Digoxin (Digoxin)  0.125 mg PO DAILY [facility]


   Last Admin: 05/28/18 08:19 Dose:  0.125 mg


Famotidine (Pepcid)  20 mg PO BID [facility]


   Last Admin: 05/28/18 08:19 Dose:  20 mg


Furosemide (Lasix)  20 mg PO DAILY [facility]


   Last Admin: 05/28/18 08:18 Dose:  20 mg


Cefepime HCl 2 gm/ Sodium (Chloride)  100 mls @ 100 mls/hr IVPB Q12 [facility]


   PRN Reason: Protocol


   Last Admin: 05/28/18 08:26 Dose:  100 mls/hr


Vancomycin HCl 1 gm/ Sodium (Chloride)  250 mls @ 166.667 mls/hr IVPB Q12 [facility]


   PRN Reason: Protocol


   Last Admin: 05/28/18 10:00 Dose:  166.667 mls/hr


Ipratropium Bromide (Atrovent)  0.5 mg IH RQ6 [facility]


Levalbuterol HCl (Xopenex)  0.63 mg INH RQ6 [facility]


   Last Admin: 05/28/18 13:16 Dose:  0.63 mg


Lisinopril (Zestril)  10 mg PO DAILY [facility]


   Last Admin: 05/28/18 08:16 Dose:  10 mg


Morphine Sulfate (Morphine)  2 mg IVP Q4 PRN


   PRN Reason: Pain, severe (8-10)


   Last Admin: 05/28/18 10:14 Dose:  2 mg


Nystatin (Nystop Topical Powder)  1 applic TOP TID [facility]


   Last Admin: 05/28/18 10:32 Dose:  1 applic


Oxycodone/Acetaminophen (Percocet 5/325 Mg Tab)  1 tab PO Q4 PRN


   PRN Reason: Pain, Mild (1-3)


   Stop: 05/30/18 11:47











- Labs


Labs: 


 





 05/26/18 06:30 





 05/27/18 05:30 





 











PT  13.2 Seconds (9.8-13.1)  H  05/24/18  04:30    


 


INR  1.2  (0.9-1.2)   05/24/18  04:30    


 


APTT  26.4 Seconds (25.6-37.1)   05/24/18  04:30    














- Constitutional


Appears: No Acute Distress





- Head Exam


Head Exam: NORMAL INSPECTION





- Eye Exam


Eye Exam: PERRL





- ENT Exam


ENT Exam: Normal Exam





- Neck Exam


Neck Exam: Normal Inspection





- Respiratory Exam


Respiratory Exam: Rhonchi





- Cardiovascular Exam


Cardiovascular Exam: REGULAR RHYTHM





- GI/Abdominal Exam


GI & Abdominal Exam: Soft, Normal Bowel Sounds





- Extremities Exam


Extremities Exam: Normal Inspection





- Back Exam


Additional comments: 





surgical incision healing well , staples  in place





- Neurological Exam


Neurological Exam: Alert, CN II-XII Intact, Oriented x3


Additional comments: 





almost  full extension L leg, unable  to flex L thigh , limited movement L foot

, RLE full ROM





- Psychiatric Exam


Psychiatric exam: Normal Affect, Normal Mood





- Skin


Skin Exam: Warm





Assessment and Plan


(1) Status post laminectomy


Status: Acute   





(2) Epidural abscess


Status: Acute   





(3) Spinal cord compression


Status: Acute   





(4) Staph aureus infection


Status: Acute   





(5) Multiple myeloma


Status: Chronic   





(6) Afib


Status: Chronic   





(7) HTN (hypertension)


Status: Chronic   





(8) Urinary tract infection


Status: Acute   





(9) Coagulopathy


Status: Acute   





(10) Pneumonia


Status: Acute   





- Assessment and Plan (Free Text)


Plan: 





continue Cefepime, Vanco , Digoxin , Lasix , PT and  rest of treatment , add 

DuoNeb , Cardiology  consult appreciated, Vanco trough level low , repeat level 

in am

## 2018-05-28 NOTE — CP.PCM.PN
Subjective





- Date & Time of Evaluation


Date of Evaluation: 05/28/18


Time of Evaluation: 11:10





- Subjective


Subjective: 





                                                           Spent virtually the 

entire day in the chair yesterday


                                                           Got out of bed with 

assistance of 2 people now


                                                           Denies dyspnoea


                                                           /80 mm Hg/ A 

Fib at 80 BPM


                                                           No calf tenderness


                                                           JVP flat, no oedema 

over feet


                                                           Recovering well from 

Sx





Objective





- Vital Signs/Intake and Output


Vital Signs (last 24 hours): 


 











Temp Pulse Resp BP Pulse Ox


 


 97.7 F   78   20   151/101 H  98 


 


 05/28/18 08:16  05/28/18 08:21  05/28/18 08:16  05/28/18 08:21  05/28/18 08:16











- Medications


Medications: 


 Current Medications





Albuterol (Ventolin Hfa 90 Mcg/Actuation (8 G))  2 puff IH Q4 PRN


   PRN Reason: Shortness of Breath


   Last Admin: 05/24/18 06:27 Dose:  2 puff


Allopurinol (Zyloprim)  100 mg PO DAILY [facility]


   Last Admin: 05/28/18 08:15 Dose:  100 mg


Bacitracin (Bacitracin Oint)  1 applic TOP DAILY [facility]


   Last Admin: 05/28/18 10:33 Dose:  1 applic


Carvedilol (Coreg)  25 mg PO Q12 [facility]


   Last Admin: 05/28/18 08:21 Dose:  25 mg


Dexamethasone (Decadron)  20 mg PO DAILY [facility]


   Last Admin: 05/28/18 08:19 Dose:  20 mg


Digoxin (Digoxin)  0.125 mg PO DAILY [facility]


   Last Admin: 05/28/18 08:19 Dose:  0.125 mg


Famotidine (Pepcid)  20 mg PO BID [facility]


   Last Admin: 05/28/18 08:19 Dose:  20 mg


Furosemide (Lasix)  20 mg PO DAILY [facility]


   Last Admin: 05/28/18 08:18 Dose:  20 mg


Cefepime HCl 2 gm/ Sodium (Chloride)  100 mls @ 100 mls/hr IVPB Q12 [facility]


   PRN Reason: Protocol


   Last Admin: 05/28/18 08:26 Dose:  100 mls/hr


Vancomycin HCl 1 gm/ Sodium (Chloride)  250 mls @ 166.667 mls/hr IVPB Q12 [facility]


   PRN Reason: Protocol


   Last Admin: 05/28/18 10:00 Dose:  166.667 mls/hr


Ipratropium Bromide (Atrovent)  0.5 mg IH RQ6 [facility]


   Last Admin: 05/28/18 07:04 Dose:  0.5 mg


Levalbuterol HCl (Xopenex)  0.63 mg INH RQ6 [facility]


   Last Admin: 05/28/18 07:04 Dose:  0.63 mg


Lisinopril (Zestril)  10 mg PO DAILY [facility]


   Last Admin: 05/28/18 08:16 Dose:  10 mg


Morphine Sulfate (Morphine)  2 mg IVP Q4 PRN


   PRN Reason: Pain, severe (8-10)


   Last Admin: 05/28/18 10:14 Dose:  2 mg


Nystatin (Nystop Topical Powder)  1 applic TOP TID [facility]


   Last Admin: 05/28/18 10:32 Dose:  1 applic


Oxycodone/Acetaminophen (Percocet 5/325 Mg Tab)  1 tab PO Q4 PRN


   PRN Reason: Pain, Mild (1-3)


   Stop: 05/30/18 11:47











- Labs


Labs: 


 





 05/26/18 06:30 





 05/27/18 05:30 





 











PT  13.2 Seconds (9.8-13.1)  H  05/24/18  04:30    


 


INR  1.2  (0.9-1.2)   05/24/18  04:30    


 


APTT  26.4 Seconds (25.6-37.1)   05/24/18  04:30

## 2018-05-29 VITALS
HEART RATE: 73 BPM | DIASTOLIC BLOOD PRESSURE: 83 MMHG | SYSTOLIC BLOOD PRESSURE: 123 MMHG | TEMPERATURE: 97.9 F | RESPIRATION RATE: 18 BRPM | OXYGEN SATURATION: 96 %

## 2018-05-29 PROCEDURE — 5A12012 PERFORMANCE OF CARDIAC OUTPUT, SINGLE, MANUAL: ICD-10-PCS | Performed by: HOSPITALIST

## 2018-05-29 RX ADMIN — LEVALBUTEROL SCH MG: 0.63 SOLUTION RESPIRATORY (INHALATION) at 00:59

## 2018-05-29 RX ADMIN — IPRATROPIUM BROMIDE SCH MG: 0.5 SOLUTION RESPIRATORY (INHALATION) at 00:59

## 2018-05-29 NOTE — RAD
PROCEDURE:  Fluoroscopy up to 1 hr.



HISTORY:

T11-T12 LAMINECTOMY



COMPARISON:

None



TECHNIQUE:

Standard protocol for this study/examination.



FINDINGS:

 Total fluoroscopic time (continuous mode) utilized during the 

procedure 25.3 (seconds).  Total exam DLP: (mGy) 9.78 



IMPRESSION:

Less than 1 hr fluoroscopic time utilized during performance of the 

procedure.

## 2018-05-29 NOTE — CP.PCM.PRO
Pronouncement of Death Note





- Clinical Findings


Physical Exam: No Response Verbal/Painful Stimuli, Absent Peripheral Pulses{

Carotid & Femoral}, Absent Heart & Breath Sounds, Pupils Fixed & Dilated, 

Absence of Vital Signs





- Pronouncement Time


Time of Pronouncement of Death: 06:51


Additional Comments: Code blue was called on the patient this AM. Patient 

received CPR for 30+ minutes, 6 of epi, 1 of atropine, 2 g of Mg, 300 of 

amiodarone, and 100 mg of lidocaine. Patient was intubated and ventilated with 

no problems. After 30+ minutes of CPR, no pulse was returned, and code was 

ended.





- Notifications


Medical Examiner Notified: No





- Autopsy


Autopsy Requested: No





- [place]


[place]: [id number]

## 2023-06-18 NOTE — CP.PCM.PN
Subjective





- Subjective


Subjective: 





mild post-surgical pain increased  movement  LLE , able  to ambulate with Pt  

and  walker , no SOB  , no cough , no chest  congestion





Objective





- Vital Signs/Intake and Output


Vital Signs (last 24 hours): 


 











Temp Pulse Resp BP Pulse Ox


 


 97.7 F   74   20   144/87   97 


 


 05/25/18 16:26  05/25/18 16:26  05/25/18 16:26  05/25/18 16:26  05/25/18 16:26











- Medications


Medications: 


 Current Medications





Albuterol (Ventolin Hfa 90 Mcg/Actuation (8 G))  2 puff IH Q4 PRN


   PRN Reason: Shortness of Breath


   Last Admin: 05/24/18 06:27 Dose:  2 puff


Allopurinol (Zyloprim)  100 mg PO DAILY [facility]


   Last Admin: 05/25/18 08:43 Dose:  100 mg


Bacitracin (Bacitracin Oint)  1 applic TOP DAILY [facility]


   Last Admin: 05/25/18 08:41 Dose:  1 applic


Carvedilol (Coreg)  25 mg PO Q12 [facility]


   Last Admin: 05/25/18 08:42 Dose:  25 mg


Dexamethasone (Decadron)  20 mg PO DAILY [facility]


   Last Admin: 05/25/18 08:42 Dose:  20 mg


Digoxin (Digoxin)  0.125 mg PO DAILY [facility]


   Last Admin: 05/25/18 08:42 Dose:  0.125 mg


Famotidine (Pepcid)  20 mg PO BID [facility]


   Last Admin: 05/25/18 16:10 Dose:  20 mg


Furosemide (Lasix)  20 mg PO DAILY [facility]


   Last Admin: 05/25/18 08:42 Dose:  20 mg


Cefepime HCl 1 gm/ Sodium (Chloride)  100 mls @ 100 mls/hr IVPB Q12 [facility]


   PRN Reason: Protocol


   Last Admin: 05/25/18 08:41 Dose:  100 mls/hr


Ipratropium Bromide (Atrovent)  0.5 mg IH RQ6 [facility]


   Last Admin: 05/25/18 13:24 Dose:  0.5 mg


Levalbuterol HCl (Xopenex)  0.63 mg INH RQ6 [facility]


   Last Admin: 05/25/18 13:24 Dose:  0.63 mg


Lisinopril (Zestril)  10 mg PO DAILY [facility]


   Last Admin: 05/25/18 08:43 Dose:  10 mg


Morphine Sulfate (Morphine)  1 mg IVP Q4 PRN


   PRN Reason: Pain, severe (8-10)


   Last Admin: 05/25/18 08:38 Dose:  1 mg


Nystatin (Nystop Topical Powder)  1 applic TOP TID [facility]


   Last Admin: 05/25/18 16:10 Dose:  1 applic


Oxycodone/Acetaminophen (Percocet 5/325 Mg Tab)  1 tab PO Q4 PRN


   PRN Reason: Pain, Mild (1-3)


   Stop: 05/26/18 14:33


   Last Admin: 05/25/18 14:54 Dose:  1 tab











- Labs


Labs: 


 





 05/24/18 04:30 





 05/25/18 05:35 





 











PT  13.2 Seconds (9.8-13.1)  H  05/24/18  04:30    


 


INR  1.2  (0.9-1.2)   05/24/18  04:30    


 


APTT  26.4 Seconds (25.6-37.1)   05/24/18  04:30    














- Constitutional


Appears: No Acute Distress





- Head Exam


Head Exam: NORMAL INSPECTION





- Eye Exam


Eye Exam: PERRL





- ENT Exam


ENT Exam: Normal Exam





- Neck Exam


Neck Exam: Normal Inspection





- Respiratory Exam


Respiratory Exam: Clear to Ausculation Bilateral





- Cardiovascular Exam


Cardiovascular Exam: Irregular Rhythm





- GI/Abdominal Exam


GI & Abdominal Exam: Soft, Normal Bowel Sounds





- Extremities Exam


Extremities Exam: Normal Inspection





- Back Exam


Back Exam: tenderness (mild  tenderness L-S , dressing  in place)





- Neurological Exam


Neurological Exam: Alert, CN II-XII Intact, Oriented x3


Additional comments: 





limited  movement LLE able  to partially  lift it  against  gravity , able  to 

move  left  foot , mild  numbness  distal leg  foot , RLE full ROM





- Psychiatric Exam


Psychiatric exam: Normal Affect, Normal Mood





- Skin


Skin Exam: Warm





Assessment and Plan


(1) Status post laminectomy


Status: Acute   





(2) Spinal cord compression


Status: Acute   





(3) Multiple myeloma


Status: Chronic   





(4) Afib


Status: Chronic   





(5) HTN (hypertension)


Status: Chronic   





(6) Hypokalemia


Status: Acute   





(7) Urinary tract infection


Status: Acute   





(8) Coagulopathy


Status: Acute   





(9) Pneumonia


Status: Acute   





- Assessment and Plan (Free Text)


Plan: 





continue  Cefepime for  Tx Pneumonia, wound  C-S Staph , add Vanco , f/u ID , 

continue  Xopenex , Atrovent , Morphine , Percocet and  rest  of  treatment,


off Xarelto  pre and  post -op , to have  Life-Port , f/u Chemo 92

## 2024-10-23 NOTE — US
HISTORY:

sudden elevation in hepatic enzymes



COMPARISON:

Unenhanced abdomen and pelvis CT examination 10/06/2014.



TECHNIQUE:

Sonographic evaluation of the abdomen.



FINDINGS:



LIVER:

Measures 19.2 cm.  A 3.4 x 3.0 x 3.8 cm cysts is seen the left lobe 

liver with minimal septation related, not significantly changed in 

size compared a prior abdomen pelvis CT 10/06/2014 when it measured 

3.7 greatest dimension. Overall echogenicity of the liver parenchyma 

is otherwise unremarkable. No solid mass evident. No intrahepatic 

bile duct dilatation.



GALLBLADDER:

Unremarkable. No gallstones.



COMMON BILE DUCT:

Measures 3.9 mm. No stones. No dilatation.



PANCREAS:

The tail of the pancreas is obscured by overlying bowel gas with 

remainder unremarkable.



RIGHT KIDNEY:

Measures 13.3cm. Normal echogenicity. No calculus, mass, or 

hydronephrosis.



LEFT KIDNEY:

Measures 13.1cm. Normal echogenicity. No calculus, mass, or 

hydronephrosis.



SPLEEN:

Normal in size and contour. No mass. The spleen measures 10.7 cm.



AORTA:

No aneurysmal dilatation. 



IVC:

Unremarkable. 



OTHER FINDINGS:

None. 



IMPRESSION:

Stable minimally complex cyst left lobe liver with liver otherwise 

unremarkable.  Partial imaging of the pancreas due to overlying bowel 

gas. The remainder of the examination appears unremarkable. Patient presents for evaluation of GERD Hx of CAD, recent stenting in end of May 2024, on Aspirin and plavix Hx pertinent for sleeve gastrectomy and hiatal hernia repair in 2016 in [place]  Reports heartburn and acid reflux and occasional sensation of food getting stuck in chest C/o intermittent vomiting Had same complaints at last visit, reports symptoms have not improved despite BID PPI Denies abdominal pain, melena,  hematochezia, unintentional weight loss